# Patient Record
Sex: FEMALE | Race: BLACK OR AFRICAN AMERICAN | NOT HISPANIC OR LATINO | URBAN - METROPOLITAN AREA
[De-identification: names, ages, dates, MRNs, and addresses within clinical notes are randomized per-mention and may not be internally consistent; named-entity substitution may affect disease eponyms.]

---

## 2019-11-30 ENCOUNTER — EMERGENCY (EMERGENCY)
Facility: HOSPITAL | Age: 64
LOS: 1 days | Discharge: HOME | End: 2019-11-30
Attending: EMERGENCY MEDICINE | Admitting: EMERGENCY MEDICINE
Payer: MEDICARE

## 2019-11-30 VITALS
DIASTOLIC BLOOD PRESSURE: 78 MMHG | SYSTOLIC BLOOD PRESSURE: 134 MMHG | RESPIRATION RATE: 16 BRPM | HEART RATE: 87 BPM | OXYGEN SATURATION: 100 %

## 2019-11-30 VITALS
RESPIRATION RATE: 100 BRPM | OXYGEN SATURATION: 100 % | SYSTOLIC BLOOD PRESSURE: 154 MMHG | DIASTOLIC BLOOD PRESSURE: 93 MMHG | TEMPERATURE: 98 F

## 2019-11-30 LAB
ALBUMIN SERPL ELPH-MCNC: 5 G/DL — SIGNIFICANT CHANGE UP (ref 3.3–5)
ALP SERPL-CCNC: 103 U/L — SIGNIFICANT CHANGE UP (ref 40–120)
ALT FLD-CCNC: 25 U/L — SIGNIFICANT CHANGE UP (ref 4–33)
ANION GAP SERPL CALC-SCNC: 14 MMO/L — SIGNIFICANT CHANGE UP (ref 7–14)
AST SERPL-CCNC: 23 U/L — SIGNIFICANT CHANGE UP (ref 4–32)
BASOPHILS # BLD AUTO: 0.05 K/UL — SIGNIFICANT CHANGE UP (ref 0–0.2)
BASOPHILS NFR BLD AUTO: 0.7 % — SIGNIFICANT CHANGE UP (ref 0–2)
BILIRUB SERPL-MCNC: 0.6 MG/DL — SIGNIFICANT CHANGE UP (ref 0.2–1.2)
BUN SERPL-MCNC: 15 MG/DL — SIGNIFICANT CHANGE UP (ref 7–23)
CALCIUM SERPL-MCNC: 10.2 MG/DL — SIGNIFICANT CHANGE UP (ref 8.4–10.5)
CHLORIDE SERPL-SCNC: 96 MMOL/L — LOW (ref 98–107)
CO2 SERPL-SCNC: 27 MMOL/L — SIGNIFICANT CHANGE UP (ref 22–31)
CREAT SERPL-MCNC: 1.01 MG/DL — SIGNIFICANT CHANGE UP (ref 0.5–1.3)
EOSINOPHIL # BLD AUTO: 0.04 K/UL — SIGNIFICANT CHANGE UP (ref 0–0.5)
EOSINOPHIL NFR BLD AUTO: 0.6 % — SIGNIFICANT CHANGE UP (ref 0–6)
GLUCOSE SERPL-MCNC: 91 MG/DL — SIGNIFICANT CHANGE UP (ref 70–99)
HCT VFR BLD CALC: 46.3 % — HIGH (ref 34.5–45)
HGB BLD-MCNC: 15.9 G/DL — HIGH (ref 11.5–15.5)
IMM GRANULOCYTES NFR BLD AUTO: 0.1 % — SIGNIFICANT CHANGE UP (ref 0–1.5)
LYMPHOCYTES # BLD AUTO: 2.73 K/UL — SIGNIFICANT CHANGE UP (ref 1–3.3)
LYMPHOCYTES # BLD AUTO: 39.3 % — SIGNIFICANT CHANGE UP (ref 13–44)
MCHC RBC-ENTMCNC: 28.5 PG — SIGNIFICANT CHANGE UP (ref 27–34)
MCHC RBC-ENTMCNC: 34.3 % — SIGNIFICANT CHANGE UP (ref 32–36)
MCV RBC AUTO: 83.1 FL — SIGNIFICANT CHANGE UP (ref 80–100)
MONOCYTES # BLD AUTO: 0.37 K/UL — SIGNIFICANT CHANGE UP (ref 0–0.9)
MONOCYTES NFR BLD AUTO: 5.3 % — SIGNIFICANT CHANGE UP (ref 2–14)
NEUTROPHILS # BLD AUTO: 3.74 K/UL — SIGNIFICANT CHANGE UP (ref 1.8–7.4)
NEUTROPHILS NFR BLD AUTO: 54 % — SIGNIFICANT CHANGE UP (ref 43–77)
NRBC # FLD: 0 K/UL — SIGNIFICANT CHANGE UP (ref 0–0)
PLATELET # BLD AUTO: 297 K/UL — SIGNIFICANT CHANGE UP (ref 150–400)
PMV BLD: 9.8 FL — SIGNIFICANT CHANGE UP (ref 7–13)
POTASSIUM SERPL-MCNC: 4.7 MMOL/L — SIGNIFICANT CHANGE UP (ref 3.5–5.3)
POTASSIUM SERPL-SCNC: 4.7 MMOL/L — SIGNIFICANT CHANGE UP (ref 3.5–5.3)
PROT SERPL-MCNC: 9.1 G/DL — HIGH (ref 6–8.3)
RBC # BLD: 5.57 M/UL — HIGH (ref 3.8–5.2)
RBC # FLD: 13.9 % — SIGNIFICANT CHANGE UP (ref 10.3–14.5)
SODIUM SERPL-SCNC: 137 MMOL/L — SIGNIFICANT CHANGE UP (ref 135–145)
TROPONIN T, HIGH SENSITIVITY: < 6 NG/L — SIGNIFICANT CHANGE UP (ref ?–14)
TSH SERPL-MCNC: 1.13 UIU/ML — SIGNIFICANT CHANGE UP (ref 0.27–4.2)
WBC # BLD: 6.94 K/UL — SIGNIFICANT CHANGE UP (ref 3.8–10.5)
WBC # FLD AUTO: 6.94 K/UL — SIGNIFICANT CHANGE UP (ref 3.8–10.5)

## 2019-11-30 PROCEDURE — 99284 EMERGENCY DEPT VISIT MOD MDM: CPT | Mod: 25

## 2019-11-30 PROCEDURE — 71046 X-RAY EXAM CHEST 2 VIEWS: CPT | Mod: 26

## 2019-11-30 PROCEDURE — 93010 ELECTROCARDIOGRAM REPORT: CPT

## 2019-11-30 NOTE — ED PROVIDER NOTE - NSFOLLOWUPINSTRUCTIONS_ED_ALL_ED_FT
Advance activity as tolerated.  Continue all previously prescribed medications as directed unless otherwise instructed.  Follow up with your primary care physician and cardiology (referral list provided) in 48-72 hours- bring copies of your results.  Return to the ER for worsening or persistent symptoms, including but not limited to worsening/persistent pain, numbness, weakness, shortness of breath, palpitations, lightheadedness, and/or ANY NEW OR CONCERNING SYMPTOMS. If you have issues obtaining follow up, please call: [phone] to obtain a doctor or specialist who takes your insurance in your area.  You may call [phone] to make an appointment with the internal medicine clinic.

## 2019-11-30 NOTE — ED PROVIDER NOTE - CARE PLAN
Principal Discharge DX:	Shoulder pain  Secondary Diagnosis:	Difficulty sleeping  Secondary Diagnosis:	Chest pain

## 2019-11-30 NOTE — ED PROVIDER NOTE - PROGRESS NOTE DETAILS
PA ZAYAS:  TSH normal.  Trop < 6.  Pt medically stable for discharge.  Pt to follow up with PMD and cardiology (referral list provided).  Strict return precautions given.

## 2019-11-30 NOTE — ED PROVIDER NOTE - ATTENDING CONTRIBUTION TO CARE
Attending note:   After face to face evaluation of this patient, I concur with above noted hx, pe, and care plan for this patient.  65 y/o F with pre-dm, htn with c/o left neck pain radiating down left arm for a long time with c/o inability to sleep.    _reproducible chest wall pain.    Evaluation in progress

## 2019-11-30 NOTE — ED ADULT NURSE NOTE - OBJECTIVE STATEMENT
Patient presents to room 11 c/o unable to sleep and chest discomfort. Pt a&ox4, self care ambulatory with family at bedside. states she hasn't been sleeping for the past week and last night did not sleep at all.  Pt also complaining of intermittent chest discomfort and left sided neck pain for over a week and she has been feeling more anxious recently, denies increase of stress at home. Pt states she recently traveled to [place] and [employer], denies SOB, dizziness, weakness, headache at this time. Breathing equal and unlabored, abdomen nondistended and nontender at this time. Skin intact. Placed on cardiac monitor, NSR. Will continue to monitor.

## 2019-11-30 NOTE — ED ADULT TRIAGE NOTE - CHIEF COMPLAINT QUOTE
China and Jennifer arrived in [facility] on 11/19/19.  c/o "chest discomfort radiating to upper back, neck, scapular pain with hand cramping" x 2 days.  Endorses lump to left neck x 2 weeks

## 2019-11-30 NOTE — ED PROVIDER NOTE - PATIENT PORTAL LINK FT
You can access the FollowMyHealth Patient Portal offered by [facility] by registering at the following website: [email or web address]. By joining [email or web address]’s FollowMyHealth portal, you will also be able to view your health information using other applications (apps) compatible with our system.

## 2019-11-30 NOTE — ED PROVIDER NOTE - OBJECTIVE STATEMENT
Pt is a 65 y/o F nonsmoker PMHx HTN, DM, hysterectomy, partial thyroidectomy p/w insomnia x 1 year.  Pt states for past 1 year, she has had insomnia, described as difficulty staying asleep.  She states for past few weeks, she gets 1-2 hours of sleep because she wakes up with "mind wandering."  Pt also reports intermittent left trapezius, shoulder and arm pain with associated left hand cramping, mild to moderate in intensity, without any specific triggering factors; worsens at times with ROM of left shoulder; lasts for minutes.  Separately, pt also notes intermittent episodes of midsternal, mild, chest tightness, nonradiating, nonpleuritic, nonpositional, nonexertional, lasts for seconds; started 5 weeks ago and last episode was three weeks ago.  Pt denies any fevers, chills, nausea, vomiting, numbness, weakness, jaw/back/abdominal pain, calf pain/swelling, h/o dvt/pe, hemoptysis, h/o malignancy, recent surgeries, recent prolonged immobilization, hormonal replacement therapy, family history of heart disease, illicit drug use, SI/HI, AH/VH, or any other specific complaints. Pt is a 63 y/o F nonsmoker PMHx HTN, DM, hysterectomy, partial thyroidectomy p/w insomnia x 1 year.  Pt states for past 1 year, she has had insomnia, described as difficulty staying asleep.  She states for past few weeks, she gets 1-2 hours of sleep because she wakes up with "mind wandering."  Pt also reports intermittent left trapezius, shoulder and arm pain with associated left hand cramping, mild to moderate in intensity, without any specific triggering factors; worsens at times with ROM of left shoulder; lasts for minutes.  Separately, pt also notes intermittent episodes of midsternal, mild, chest tightness, nonradiating, nonpleuritic, nonpositional, nonexertional, lasts for seconds; started 5 weeks ago and last episode was three weeks ago.  Pt denies any fevers, chills, nausea, vomiting, numbness, weakness, jaw/back/abdominal pain, calf pain/swelling, h/o dvt/pe, hemoptysis, h/o malignancy, recent surgeries, recent prolonged immobilization, hormonal replacement therapy, family history of heart disease, illicit drug use, SI/HI, AH/VH, or any other specific complaints..

## 2019-11-30 NOTE — ED ADULT NURSE NOTE - CHIEF COMPLAINT QUOTE
China and Jennifer arrived in [facility] on 11/19/19.  c/o "chest discomfort radiating to upper back, neck, scapular pain with hand cramping" x 2 days.  Endorses lump to left neck x 2 weeks

## 2019-11-30 NOTE — ED PROVIDER NOTE - CLINICAL SUMMARY MEDICAL DECISION MAKING FREE TEXT BOX
Pt is a 65 y/o F nonsmoker PMHx HTN, DM, hysterectomy, partial thyroidectomy p/w insomnia x 1 year -- insomnia, likely musculoskeletal pain, possible cervical radiculopathy; not clinically concerning for shoulder fracture or septic joint, low suspicion for ACS, not clinically concerning for aortic dissection or PE -- labs, trop, tsh, cxr, ekg

## 2019-12-05 ENCOUNTER — EMERGENCY (EMERGENCY)
Facility: HOSPITAL | Age: 64
LOS: 1 days | Discharge: HOME | End: 2019-12-05
Attending: STUDENT IN AN ORGANIZED HEALTH CARE EDUCATION/TRAINING PROGRAM | Admitting: STUDENT IN AN ORGANIZED HEALTH CARE EDUCATION/TRAINING PROGRAM
Payer: MEDICARE

## 2019-12-05 VITALS
SYSTOLIC BLOOD PRESSURE: 131 MMHG | HEART RATE: 78 BPM | RESPIRATION RATE: 16 BRPM | TEMPERATURE: 98 F | OXYGEN SATURATION: 100 % | DIASTOLIC BLOOD PRESSURE: 78 MMHG

## 2019-12-05 VITALS
OXYGEN SATURATION: 100 % | TEMPERATURE: 98 F | RESPIRATION RATE: 16 BRPM | HEART RATE: 100 BPM | DIASTOLIC BLOOD PRESSURE: 90 MMHG | SYSTOLIC BLOOD PRESSURE: 165 MMHG

## 2019-12-05 LAB
APPEARANCE UR: CLEAR — SIGNIFICANT CHANGE UP
BILIRUB UR-MCNC: NEGATIVE — SIGNIFICANT CHANGE UP
BLOOD UR QL VISUAL: NEGATIVE — SIGNIFICANT CHANGE UP
COLOR SPEC: SIGNIFICANT CHANGE UP
GLUCOSE UR-MCNC: NEGATIVE — SIGNIFICANT CHANGE UP
KETONES UR-MCNC: NEGATIVE — SIGNIFICANT CHANGE UP
LEUKOCYTE ESTERASE UR-ACNC: SIGNIFICANT CHANGE UP
NITRITE UR-MCNC: NEGATIVE — SIGNIFICANT CHANGE UP
PH UR: 7 — SIGNIFICANT CHANGE UP (ref 5–8)
PROT UR-MCNC: 20 — SIGNIFICANT CHANGE UP
RBC CASTS # UR COMP ASSIST: SIGNIFICANT CHANGE UP (ref 0–?)
SP GR SPEC: 1.02 — SIGNIFICANT CHANGE UP (ref 1–1.04)
SQUAMOUS # UR AUTO: SIGNIFICANT CHANGE UP
UROBILINOGEN FLD QL: NORMAL — SIGNIFICANT CHANGE UP
WBC UR QL: SIGNIFICANT CHANGE UP (ref 0–?)

## 2019-12-05 PROCEDURE — 99284 EMERGENCY DEPT VISIT MOD MDM: CPT

## 2019-12-05 RX ORDER — ACETAMINOPHEN 500 MG
975 TABLET ORAL ONCE
Refills: 0 | Status: COMPLETED | OUTPATIENT
Start: 2019-12-05 | End: 2019-12-05

## 2019-12-05 RX ORDER — IBUPROFEN 200 MG
400 TABLET ORAL ONCE
Refills: 0 | Status: COMPLETED | OUTPATIENT
Start: 2019-12-05 | End: 2019-12-05

## 2019-12-05 RX ADMIN — Medication 400 MILLIGRAM(S): at 12:36

## 2019-12-05 RX ADMIN — Medication 975 MILLIGRAM(S): at 12:35

## 2019-12-05 NOTE — ED PROVIDER NOTE - ATTENDING CONTRIBUTION TO CARE
65 y/o F nonsmoker PMHx HTN, DM, pw left flank pain and burning urination since yesterday. no nausea, no vomiting, no fevers, no chills.   no tenderness on exam  will check UA  pt also complains of intermittent SOB, was seen in ED 5 days ago for chest pain/sob, will rpt ekg and pt knows to follow up with pmd/cards 65 y/o F nonsmoker PMHx HTN, DM, pw left flank pain and burning urination since yesterday. no nausea, no vomiting, no fevers, no chills.   no tenderness on exam  will check UA  pt also complains of intermittent SOB, when walking up steps, non consistent, was seen in ED 5 days ago for chest pain/sob, will rpt ekg and pt knows to follow up with pmd/cards

## 2019-12-05 NOTE — ED PROVIDER NOTE - CLINICAL SUMMARY MEDICAL DECISION MAKING FREE TEXT BOX
65 yo female w/ pmhx HTN, DM presents to ED c/o left flank pain radiating to left abdomen since last night.   Pain worse with movement, no fever, dysuria, cp. Endorses mild cough and SOB on exertion. EKG NSR, pt had negative work up 2 days ago.  Likely musculoskeletal, will give nsaids, check UA and reassess

## 2019-12-05 NOTE — ED PROVIDER NOTE - NSFOLLOWUPINSTRUCTIONS_ED_ALL_ED_FT
Take tylenol 975mg every 6-8 hours as needed for pain.  Drink plenty of fluids.  Avoid any strenuous activity.   Follow up with cardiologist- Dr. Pugh within 1 week.  Return to ED for any worsening chest pain, shortness of breath, weakness or any new/concerning symptoms.

## 2019-12-05 NOTE — ED ADULT TRIAGE NOTE - CHIEF COMPLAINT QUOTE
Pt states that she is having chest pain and burning mid back pain x 1 week, seen for same 5 days ago, states that pain is not getting better despite Motrin.  Past medical history: HTN, DM

## 2019-12-05 NOTE — ED PROVIDER NOTE - PATIENT PORTAL LINK FT
You can access the FollowMyHealth Patient Portal offered by [facility] by registering at the following website: [email or web address]. By joining [employer]’s FollowMyHealth portal, you will also be able to view your health information using other applications (apps) compatible with our system.

## 2019-12-05 NOTE — ED PROVIDER NOTE - PROGRESS NOTE DETAILS
ALESIA Spann: UA negative, pt feeling better, well appearing, VSS.  Shared decision making done with patient, advised should follow up with cardiologist for further work up, offered CDU.  Pt states feels well enough to follow up outpatient. Return precautions given to patient.

## 2019-12-05 NOTE — ED PROVIDER NOTE - OBJECTIVE STATEMENT
63 yo female w/ pmhx HTN, DM presents to ED c/o left flank pain radiating to left abdomen since last night.  Pt states pain worse with movement, 7/10 pain scale.  Pt also endorses intermittent SOB when she walks up the stairs.  Pt has been having a cough for the past few days.  Pt was seen in the ED 2 days ago for similar symptoms-had normal labs, cxr.  Pt has not taken anything for pain.  +urinary frequency.  Denies any CP, abd pain, n/v/d, fever, chills.

## 2019-12-05 NOTE — ED ADULT NURSE NOTE - OBJECTIVE STATEMENT
Pt c/o back pain, intermittent, denies injury, denies any urinary symptoms; pain 6-7/10; medication given as per MD; pt moved to RW.

## 2019-12-06 PROBLEM — I10 ESSENTIAL (PRIMARY) HYPERTENSION: Chronic | Status: ACTIVE | Noted: 2019-12-05

## 2019-12-06 PROBLEM — E11.9 TYPE 2 DIABETES MELLITUS WITHOUT COMPLICATIONS: Chronic | Status: ACTIVE | Noted: 2019-12-05

## 2019-12-06 LAB — SPECIMEN SOURCE: SIGNIFICANT CHANGE UP

## 2019-12-07 LAB — BACTERIA UR CULT: SIGNIFICANT CHANGE UP

## 2019-12-13 ENCOUNTER — APPOINTMENT (OUTPATIENT)
Dept: PULMONOLOGY | Facility: CLINIC | Age: 64
End: 2019-12-13

## 2019-12-31 ENCOUNTER — APPOINTMENT (OUTPATIENT)
Dept: PULMONOLOGY | Facility: CLINIC | Age: 64
End: 2019-12-31

## 2020-01-13 ENCOUNTER — OUTPATIENT (OUTPATIENT)
Dept: OUTPATIENT SERVICES | Facility: HOSPITAL | Age: 65
LOS: 1 days | Discharge: HOME | End: 2020-01-13

## 2020-01-13 DIAGNOSIS — E78.41 ELEVATED LIPOPROTEIN(A): ICD-10-CM

## 2020-01-21 ENCOUNTER — APPOINTMENT (OUTPATIENT)
Dept: HEMATOLOGY ONCOLOGY | Facility: CLINIC | Age: 65
End: 2020-01-21
Payer: MEDICARE

## 2020-01-21 ENCOUNTER — RESULT REVIEW (OUTPATIENT)
Age: 65
End: 2020-01-21

## 2020-01-21 ENCOUNTER — OUTPATIENT (OUTPATIENT)
Dept: OUTPATIENT SERVICES | Facility: HOSPITAL | Age: 65
LOS: 1 days | End: 2020-01-21
Payer: MEDICARE

## 2020-01-21 ENCOUNTER — OUTPATIENT (OUTPATIENT)
Dept: OUTPATIENT SERVICES | Facility: HOSPITAL | Age: 65
LOS: 1 days | Discharge: HOME | End: 2020-01-21

## 2020-01-21 VITALS
DIASTOLIC BLOOD PRESSURE: 84 MMHG | TEMPERATURE: 97.9 F | RESPIRATION RATE: 16 BRPM | HEART RATE: 85 BPM | BODY MASS INDEX: 30.18 KG/M2 | SYSTOLIC BLOOD PRESSURE: 179 MMHG | OXYGEN SATURATION: 99 % | WEIGHT: 190.04 LBS | HEIGHT: 66.73 IN

## 2020-01-21 DIAGNOSIS — R06.83 SNORING: ICD-10-CM

## 2020-01-21 DIAGNOSIS — Z86.79 PERSONAL HISTORY OF OTHER DISEASES OF THE CIRCULATORY SYSTEM: ICD-10-CM

## 2020-01-21 DIAGNOSIS — E11.9 TYPE 2 DIABETES MELLITUS W/OUT COMPLICATIONS: ICD-10-CM

## 2020-01-21 DIAGNOSIS — Z78.9 OTHER SPECIFIED HEALTH STATUS: ICD-10-CM

## 2020-01-21 DIAGNOSIS — D64.9 ANEMIA, UNSPECIFIED: ICD-10-CM

## 2020-01-21 DIAGNOSIS — Z80.0 FAMILY HISTORY OF MALIGNANT NEOPLASM OF DIGESTIVE ORGANS: ICD-10-CM

## 2020-01-21 DIAGNOSIS — G47.00 INSOMNIA, UNSPECIFIED: ICD-10-CM

## 2020-01-21 DIAGNOSIS — D58.2 OTHER HEMOGLOBINOPATHIES: ICD-10-CM

## 2020-01-21 DIAGNOSIS — Z80.3 FAMILY HISTORY OF MALIGNANT NEOPLASM OF BREAST: ICD-10-CM

## 2020-01-21 LAB
BASOPHILS # BLD AUTO: 0.1 K/UL — SIGNIFICANT CHANGE UP (ref 0–0.2)
BASOPHILS NFR BLD AUTO: 0.8 % — SIGNIFICANT CHANGE UP (ref 0–2)
COHGB MFR BLDV: 3.8 % — HIGH (ref 0–1.5)
EOSINOPHIL # BLD AUTO: 0.1 K/UL — SIGNIFICANT CHANGE UP (ref 0–0.5)
EOSINOPHIL NFR BLD AUTO: 0.8 % — SIGNIFICANT CHANGE UP (ref 0–6)
HCT VFR BLD CALC: 41.7 % — SIGNIFICANT CHANGE UP (ref 34.5–45)
HGB BLD CALC-MCNC: 14 G/DL — SIGNIFICANT CHANGE UP (ref 11.5–15.5)
HGB BLD-MCNC: 14.1 G/DL — SIGNIFICANT CHANGE UP (ref 11.5–15.5)
LYMPHOCYTES # BLD AUTO: 2.4 K/UL — SIGNIFICANT CHANGE UP (ref 1–3.3)
LYMPHOCYTES # BLD AUTO: 31.8 % — SIGNIFICANT CHANGE UP (ref 13–44)
MCHC RBC-ENTMCNC: 29.3 PG — SIGNIFICANT CHANGE UP (ref 27–34)
MCHC RBC-ENTMCNC: 33.9 G/DL — SIGNIFICANT CHANGE UP (ref 32–36)
MCV RBC AUTO: 86.5 FL — SIGNIFICANT CHANGE UP (ref 80–100)
MONOCYTES # BLD AUTO: 0.4 K/UL — SIGNIFICANT CHANGE UP (ref 0–0.9)
MONOCYTES NFR BLD AUTO: 5.4 % — SIGNIFICANT CHANGE UP (ref 2–14)
NEUTROPHILS # BLD AUTO: 4.6 K/UL — SIGNIFICANT CHANGE UP (ref 1.8–7.4)
NEUTROPHILS NFR BLD AUTO: 61.2 % — SIGNIFICANT CHANGE UP (ref 43–77)
PLATELET # BLD AUTO: 229 K/UL — SIGNIFICANT CHANGE UP (ref 150–400)
RBC # BLD: 4.81 M/UL — SIGNIFICANT CHANGE UP (ref 3.8–5.2)
RBC # FLD: 13.2 % — SIGNIFICANT CHANGE UP (ref 10.3–14.5)
WBC # BLD: 7.6 K/UL — SIGNIFICANT CHANGE UP (ref 3.8–10.5)
WBC # FLD AUTO: 7.6 K/UL — SIGNIFICANT CHANGE UP (ref 3.8–10.5)

## 2020-01-21 PROCEDURE — 82375 ASSAY CARBOXYHB QUANT: CPT

## 2020-01-21 PROCEDURE — 99205 OFFICE O/P NEW HI 60 MIN: CPT

## 2020-01-21 NOTE — HISTORY OF PRESENT ILLNESS
[de-identified] : 63 y/o F with type 2 DM well controlled and HTN who is presenting today for evaluation of elevated hemoglobin and hematocrit. She reports that she went to the ER and they told her that her numbers were elevated. She has been very anxious about this since then. She reports she went to the ER because she had a chest pain radiating to her back. They did full cardiac workup which was negative, and told her it was musculoskeletal. She has since said those symptoms resolved. However, she has had persistent trouble sleeping, and symptoms of depression. She reports that she has little pleasure in doing things she usually loves and has trouble getting up and out because of feeling of hopelessness. She sleeps very poorly, awaking frequently throughout the night with gasps of breath. She reports her  tells her she snores loud at night.

## 2020-01-21 NOTE — CONSULT LETTER
[Dear  ___] : Dear  [unfilled], [Consult Letter:] : I had the pleasure of evaluating your patient, [unfilled]. [Please see my note below.] : Please see my note below. [Consult Closing:] : Thank you very much for allowing me to participate in the care of this patient.  If you have any questions, please do not hesitate to contact me. [Sincerely,] : Sincerely, [FreeTextEntry3] : Bradley Goldberg M.D. \par Hematology/Oncology\par [facility] [place]\par [phone]\par

## 2020-01-21 NOTE — ASSESSMENT
[FreeTextEntry1] : 63 y/o F with elevated hemoglobin, most likely secondary due to sleep apnea. \par \par -Based on patient's clinical presentation and labwork, patient most likely has a compensatory elevated hemoglobin due to sleep apnea.\par -Will check patient's CBC today and erythropoietin / carboxyhemoglobin level. \par -Instructed patient to follow up with sleep study (has already been referred by PMD). \par -RTC in 3 months - follow up results of sleep study. \par -Patient seeing psychiatrist for depression, was tearful in office, counselling and emotional support provided.

## 2020-01-21 NOTE — REVIEW OF SYSTEMS
[Fatigue] : fatigue [Insomnia] : insomnia [Anxiety] : anxiety [Depression] : depression [Fever] : no fever [Chills] : no chills [Night Sweats] : no night sweats [Eye Pain] : no eye pain [Vision Problems] : no vision problems [Dysphagia] : no dysphagia [Nosebleeds] : no nosebleeds [Chest Pain] : no chest pain [Palpitations] : no palpitations [Shortness Of Breath] : no shortness of breath [Wheezing] : no wheezing [Abdominal Pain] : no abdominal pain [Vomiting] : no vomiting [Diarrhea] : no diarrhea [Dysuria] : no dysuria [Joint Pain] : no joint pain [Joint Stiffness] : no joint stiffness [Skin Rash] : no skin rash [Dizziness] : no dizziness [Fainting] : no fainting [Easy Bleeding] : no tendency for easy bleeding [Easy Bruising] : no tendency for easy bruising

## 2020-01-22 LAB
ALBUMIN SERPL ELPH-MCNC: 4.5 G/DL
ALP BLD-CCNC: 100 U/L
ALT SERPL-CCNC: 24 U/L
ANION GAP SERPL CALC-SCNC: 17 MMOL/L
AST SERPL-CCNC: 22 U/L
BILIRUB SERPL-MCNC: 0.3 MG/DL
BUN SERPL-MCNC: 18 MG/DL
CALCIUM SERPL-MCNC: 10.2 MG/DL
CHLORIDE SERPL-SCNC: 100 MMOL/L
CO2 SERPL-SCNC: 25 MMOL/L
CREAT SERPL-MCNC: 1.12 MG/DL
EPO SERPL-MCNC: 20.8 MIU/ML
GLUCOSE SERPL-MCNC: 81 MG/DL
POTASSIUM SERPL-SCNC: 4.5 MMOL/L
PROT SERPL-MCNC: 7.9 G/DL
SODIUM SERPL-SCNC: 143 MMOL/L

## 2020-04-17 ENCOUNTER — OUTPATIENT (OUTPATIENT)
Dept: OUTPATIENT SERVICES | Facility: HOSPITAL | Age: 65
LOS: 1 days | Discharge: HOME | End: 2020-04-17

## 2020-04-17 DIAGNOSIS — D64.9 ANEMIA, UNSPECIFIED: ICD-10-CM

## 2020-04-24 ENCOUNTER — APPOINTMENT (OUTPATIENT)
Dept: HEMATOLOGY ONCOLOGY | Facility: CLINIC | Age: 65
End: 2020-04-24

## 2021-05-20 ENCOUNTER — INPATIENT (INPATIENT)
Facility: HOSPITAL | Age: 66
LOS: 7 days | Discharge: HOME | End: 2021-05-28
Attending: INTERNAL MEDICINE | Admitting: INTERNAL MEDICINE
Payer: MEDICARE

## 2021-05-20 VITALS
TEMPERATURE: 99 F | DIASTOLIC BLOOD PRESSURE: 73 MMHG | SYSTOLIC BLOOD PRESSURE: 173 MMHG | RESPIRATION RATE: 18 BRPM | HEART RATE: 93 BPM | OXYGEN SATURATION: 100 %

## 2021-05-20 DIAGNOSIS — E78.49 OTHER HYPERLIPIDEMIA: ICD-10-CM

## 2021-05-20 DIAGNOSIS — I10 ESSENTIAL (PRIMARY) HYPERTENSION: ICD-10-CM

## 2021-05-20 DIAGNOSIS — N12 TUBULO-INTERSTITIAL NEPHRITIS, NOT SPECIFIED AS ACUTE OR CHRONIC: ICD-10-CM

## 2021-05-20 DIAGNOSIS — R26.9 UNSPECIFIED ABNORMALITIES OF GAIT AND MOBILITY: ICD-10-CM

## 2021-05-20 DIAGNOSIS — E11.649 TYPE 2 DIABETES MELLITUS WITH HYPOGLYCEMIA WITHOUT COMA: ICD-10-CM

## 2021-05-20 LAB
A1C WITH ESTIMATED AVERAGE GLUCOSE RESULT: 7.1 % — HIGH (ref 4–5.6)
ALBUMIN SERPL ELPH-MCNC: 4.1 G/DL — SIGNIFICANT CHANGE UP (ref 3.3–5)
ALP SERPL-CCNC: 102 U/L — SIGNIFICANT CHANGE UP (ref 40–120)
ALT FLD-CCNC: 26 U/L — SIGNIFICANT CHANGE UP (ref 4–33)
ANION GAP SERPL CALC-SCNC: 14 MMOL/L — SIGNIFICANT CHANGE UP (ref 7–14)
ANION GAP SERPL CALC-SCNC: 14 MMOL/L — SIGNIFICANT CHANGE UP (ref 7–14)
APPEARANCE UR: CLEAR — SIGNIFICANT CHANGE UP
APTT BLD: 27.1 SEC — SIGNIFICANT CHANGE UP (ref 27–36.3)
AST SERPL-CCNC: 28 U/L — SIGNIFICANT CHANGE UP (ref 4–32)
BASE EXCESS BLDV CALC-SCNC: 0.6 MMOL/L — SIGNIFICANT CHANGE UP (ref -3–2)
BASOPHILS # BLD AUTO: 0.04 K/UL — SIGNIFICANT CHANGE UP (ref 0–0.2)
BASOPHILS NFR BLD AUTO: 0.4 % — SIGNIFICANT CHANGE UP (ref 0–2)
BILIRUB SERPL-MCNC: 0.4 MG/DL — SIGNIFICANT CHANGE UP (ref 0.2–1.2)
BILIRUB UR-MCNC: NEGATIVE — SIGNIFICANT CHANGE UP
BUN SERPL-MCNC: 18 MG/DL — SIGNIFICANT CHANGE UP (ref 7–23)
BUN SERPL-MCNC: 20 MG/DL — SIGNIFICANT CHANGE UP (ref 7–23)
CALCIUM SERPL-MCNC: 9.3 MG/DL — SIGNIFICANT CHANGE UP (ref 8.4–10.5)
CALCIUM SERPL-MCNC: 9.6 MG/DL — SIGNIFICANT CHANGE UP (ref 8.4–10.5)
CHLORIDE SERPL-SCNC: 94 MMOL/L — LOW (ref 98–107)
CHLORIDE SERPL-SCNC: 96 MMOL/L — LOW (ref 98–107)
CO2 SERPL-SCNC: 22 MMOL/L — SIGNIFICANT CHANGE UP (ref 22–31)
CO2 SERPL-SCNC: 23 MMOL/L — SIGNIFICANT CHANGE UP (ref 22–31)
COLOR SPEC: SIGNIFICANT CHANGE UP
CREAT SERPL-MCNC: 1.14 MG/DL — SIGNIFICANT CHANGE UP (ref 0.5–1.3)
CREAT SERPL-MCNC: 1.3 MG/DL — SIGNIFICANT CHANGE UP (ref 0.5–1.3)
DIFF PNL FLD: NEGATIVE — SIGNIFICANT CHANGE UP
EOSINOPHIL # BLD AUTO: 0.01 K/UL — SIGNIFICANT CHANGE UP (ref 0–0.5)
EOSINOPHIL NFR BLD AUTO: 0.1 % — SIGNIFICANT CHANGE UP (ref 0–6)
ESTIMATED AVERAGE GLUCOSE: 157 MG/DL — HIGH (ref 68–114)
GLUCOSE SERPL-MCNC: 200 MG/DL — HIGH (ref 70–99)
GLUCOSE SERPL-MCNC: 208 MG/DL — HIGH (ref 70–99)
GLUCOSE UR QL: NEGATIVE — SIGNIFICANT CHANGE UP
HCO3 BLDV-SCNC: 23 MMOL/L — SIGNIFICANT CHANGE UP (ref 20–27)
HCT VFR BLD CALC: 36.9 % — SIGNIFICANT CHANGE UP (ref 34.5–45)
HCT VFR BLD CALC: 38 % — SIGNIFICANT CHANGE UP (ref 34.5–45)
HGB BLD-MCNC: 12.8 G/DL — SIGNIFICANT CHANGE UP (ref 11.5–15.5)
HGB BLD-MCNC: 12.9 G/DL — SIGNIFICANT CHANGE UP (ref 11.5–15.5)
IANC: 6.97 K/UL — SIGNIFICANT CHANGE UP (ref 1.5–8.5)
IMM GRANULOCYTES NFR BLD AUTO: 0.3 % — SIGNIFICANT CHANGE UP (ref 0–1.5)
INR BLD: 1.12 RATIO — SIGNIFICANT CHANGE UP (ref 0.88–1.16)
KETONES UR-MCNC: NEGATIVE — SIGNIFICANT CHANGE UP
LEUKOCYTE ESTERASE UR-ACNC: ABNORMAL
LYMPHOCYTES # BLD AUTO: 1.35 K/UL — SIGNIFICANT CHANGE UP (ref 1–3.3)
LYMPHOCYTES # BLD AUTO: 14.7 % — SIGNIFICANT CHANGE UP (ref 13–44)
MAGNESIUM SERPL-MCNC: 1.7 MG/DL — SIGNIFICANT CHANGE UP (ref 1.6–2.6)
MCHC RBC-ENTMCNC: 27.4 PG — SIGNIFICANT CHANGE UP (ref 27–34)
MCHC RBC-ENTMCNC: 27.5 PG — SIGNIFICANT CHANGE UP (ref 27–34)
MCHC RBC-ENTMCNC: 33.9 GM/DL — SIGNIFICANT CHANGE UP (ref 32–36)
MCHC RBC-ENTMCNC: 34.7 GM/DL — SIGNIFICANT CHANGE UP (ref 32–36)
MCV RBC AUTO: 79.4 FL — LOW (ref 80–100)
MCV RBC AUTO: 80.9 FL — SIGNIFICANT CHANGE UP (ref 80–100)
MONOCYTES # BLD AUTO: 0.79 K/UL — SIGNIFICANT CHANGE UP (ref 0–0.9)
MONOCYTES NFR BLD AUTO: 8.6 % — SIGNIFICANT CHANGE UP (ref 2–14)
NEUTROPHILS # BLD AUTO: 6.97 K/UL — SIGNIFICANT CHANGE UP (ref 1.8–7.4)
NEUTROPHILS NFR BLD AUTO: 75.9 % — SIGNIFICANT CHANGE UP (ref 43–77)
NITRITE UR-MCNC: NEGATIVE — SIGNIFICANT CHANGE UP
NRBC # BLD: 0 /100 WBCS — SIGNIFICANT CHANGE UP
NRBC # BLD: 0 /100 WBCS — SIGNIFICANT CHANGE UP
NRBC # FLD: 0 K/UL — SIGNIFICANT CHANGE UP
NRBC # FLD: 0 K/UL — SIGNIFICANT CHANGE UP
PCO2 BLDV: 43 MMHG — SIGNIFICANT CHANGE UP (ref 41–51)
PH BLDV: 7.38 — SIGNIFICANT CHANGE UP (ref 7.32–7.43)
PH UR: 6 — SIGNIFICANT CHANGE UP (ref 5–8)
PHOSPHATE SERPL-MCNC: 1.6 MG/DL — LOW (ref 2.5–4.5)
PLATELET # BLD AUTO: 253 K/UL — SIGNIFICANT CHANGE UP (ref 150–400)
PLATELET # BLD AUTO: 266 K/UL — SIGNIFICANT CHANGE UP (ref 150–400)
PO2 BLDV: <24 MMHG — LOW (ref 35–40)
POTASSIUM SERPL-MCNC: 3.6 MMOL/L — SIGNIFICANT CHANGE UP (ref 3.5–5.3)
POTASSIUM SERPL-MCNC: 4.3 MMOL/L — SIGNIFICANT CHANGE UP (ref 3.5–5.3)
POTASSIUM SERPL-SCNC: 3.6 MMOL/L — SIGNIFICANT CHANGE UP (ref 3.5–5.3)
POTASSIUM SERPL-SCNC: 4.3 MMOL/L — SIGNIFICANT CHANGE UP (ref 3.5–5.3)
PROT SERPL-MCNC: 8.3 G/DL — SIGNIFICANT CHANGE UP (ref 6–8.3)
PROT UR-MCNC: ABNORMAL
PROTHROM AB SERPL-ACNC: 12.7 SEC — SIGNIFICANT CHANGE UP (ref 10.6–13.6)
RBC # BLD: 4.65 M/UL — SIGNIFICANT CHANGE UP (ref 3.8–5.2)
RBC # BLD: 4.7 M/UL — SIGNIFICANT CHANGE UP (ref 3.8–5.2)
RBC # FLD: 13.9 % — SIGNIFICANT CHANGE UP (ref 10.3–14.5)
RBC # FLD: 14.2 % — SIGNIFICANT CHANGE UP (ref 10.3–14.5)
SAO2 % BLDV: 32 % — LOW (ref 60–85)
SARS-COV-2 RNA SPEC QL NAA+PROBE: SIGNIFICANT CHANGE UP
SODIUM SERPL-SCNC: 130 MMOL/L — LOW (ref 135–145)
SODIUM SERPL-SCNC: 133 MMOL/L — LOW (ref 135–145)
SP GR SPEC: 1.01 — SIGNIFICANT CHANGE UP (ref 1.01–1.02)
TROPONIN T, HIGH SENSITIVITY RESULT: <6 NG/L — SIGNIFICANT CHANGE UP
UROBILINOGEN FLD QL: SIGNIFICANT CHANGE UP
WBC # BLD: 11.06 K/UL — HIGH (ref 3.8–10.5)
WBC # BLD: 9.19 K/UL — SIGNIFICANT CHANGE UP (ref 3.8–10.5)
WBC # FLD AUTO: 11.06 K/UL — HIGH (ref 3.8–10.5)
WBC # FLD AUTO: 9.19 K/UL — SIGNIFICANT CHANGE UP (ref 3.8–10.5)

## 2021-05-20 PROCEDURE — 70496 CT ANGIOGRAPHY HEAD: CPT | Mod: 26

## 2021-05-20 PROCEDURE — 99222 1ST HOSP IP/OBS MODERATE 55: CPT | Mod: GC

## 2021-05-20 PROCEDURE — 99223 1ST HOSP IP/OBS HIGH 75: CPT

## 2021-05-20 PROCEDURE — 71045 X-RAY EXAM CHEST 1 VIEW: CPT | Mod: 26

## 2021-05-20 PROCEDURE — 93010 ELECTROCARDIOGRAM REPORT: CPT

## 2021-05-20 PROCEDURE — 99291 CRITICAL CARE FIRST HOUR: CPT | Mod: CS,25

## 2021-05-20 PROCEDURE — 70498 CT ANGIOGRAPHY NECK: CPT | Mod: 26

## 2021-05-20 PROCEDURE — 70450 CT HEAD/BRAIN W/O DYE: CPT | Mod: 26,59

## 2021-05-20 PROCEDURE — 0042T: CPT

## 2021-05-20 RX ORDER — INSULIN LISPRO 100/ML
VIAL (ML) SUBCUTANEOUS
Refills: 0 | Status: DISCONTINUED | OUTPATIENT
Start: 2021-05-20 | End: 2021-05-28

## 2021-05-20 RX ORDER — DEXTROSE 50 % IN WATER 50 %
25 SYRINGE (ML) INTRAVENOUS ONCE
Refills: 0 | Status: DISCONTINUED | OUTPATIENT
Start: 2021-05-20 | End: 2021-05-28

## 2021-05-20 RX ORDER — ACETAMINOPHEN 500 MG
1000 TABLET ORAL ONCE
Refills: 0 | Status: COMPLETED | OUTPATIENT
Start: 2021-05-20 | End: 2021-05-20

## 2021-05-20 RX ORDER — INSULIN LISPRO 100/ML
VIAL (ML) SUBCUTANEOUS AT BEDTIME
Refills: 0 | Status: DISCONTINUED | OUTPATIENT
Start: 2021-05-20 | End: 2021-05-28

## 2021-05-20 RX ORDER — ASPIRIN/CALCIUM CARB/MAGNESIUM 324 MG
81 TABLET ORAL DAILY
Refills: 0 | Status: DISCONTINUED | OUTPATIENT
Start: 2021-05-20 | End: 2021-05-28

## 2021-05-20 RX ORDER — DEXTROSE 50 % IN WATER 50 %
12.5 SYRINGE (ML) INTRAVENOUS ONCE
Refills: 0 | Status: DISCONTINUED | OUTPATIENT
Start: 2021-05-20 | End: 2021-05-28

## 2021-05-20 RX ORDER — ATORVASTATIN CALCIUM 80 MG/1
20 TABLET, FILM COATED ORAL AT BEDTIME
Refills: 0 | Status: DISCONTINUED | OUTPATIENT
Start: 2021-05-20 | End: 2021-05-28

## 2021-05-20 RX ORDER — DEXTROSE 50 % IN WATER 50 %
15 SYRINGE (ML) INTRAVENOUS ONCE
Refills: 0 | Status: DISCONTINUED | OUTPATIENT
Start: 2021-05-20 | End: 2021-05-28

## 2021-05-20 RX ORDER — OCTREOTIDE ACETATE 200 UG/ML
50 INJECTION, SOLUTION INTRAVENOUS; SUBCUTANEOUS ONCE
Refills: 0 | Status: COMPLETED | OUTPATIENT
Start: 2021-05-20 | End: 2021-05-20

## 2021-05-20 RX ORDER — GLUCAGON INJECTION, SOLUTION 0.5 MG/.1ML
1 INJECTION, SOLUTION SUBCUTANEOUS ONCE
Refills: 0 | Status: DISCONTINUED | OUTPATIENT
Start: 2021-05-20 | End: 2021-05-28

## 2021-05-20 RX ORDER — SODIUM CHLORIDE 9 MG/ML
1000 INJECTION, SOLUTION INTRAVENOUS
Refills: 0 | Status: DISCONTINUED | OUTPATIENT
Start: 2021-05-20 | End: 2021-05-22

## 2021-05-20 RX ORDER — CEFTRIAXONE 500 MG/1
INJECTION, POWDER, FOR SOLUTION INTRAMUSCULAR; INTRAVENOUS
Refills: 0 | Status: DISCONTINUED | OUTPATIENT
Start: 2021-05-20 | End: 2021-05-22

## 2021-05-20 RX ORDER — CEFTRIAXONE 500 MG/1
1000 INJECTION, POWDER, FOR SOLUTION INTRAMUSCULAR; INTRAVENOUS EVERY 24 HOURS
Refills: 0 | Status: DISCONTINUED | OUTPATIENT
Start: 2021-05-21 | End: 2021-05-22

## 2021-05-20 RX ORDER — CEFTRIAXONE 500 MG/1
1000 INJECTION, POWDER, FOR SOLUTION INTRAMUSCULAR; INTRAVENOUS ONCE
Refills: 0 | Status: COMPLETED | OUTPATIENT
Start: 2021-05-20 | End: 2021-05-20

## 2021-05-20 RX ORDER — LANOLIN ALCOHOL/MO/W.PET/CERES
6 CREAM (GRAM) TOPICAL AT BEDTIME
Refills: 0 | Status: DISCONTINUED | OUTPATIENT
Start: 2021-05-20 | End: 2021-05-28

## 2021-05-20 RX ORDER — ACETAMINOPHEN 500 MG
650 TABLET ORAL EVERY 6 HOURS
Refills: 0 | Status: DISCONTINUED | OUTPATIENT
Start: 2021-05-20 | End: 2021-05-24

## 2021-05-20 RX ORDER — AMLODIPINE BESYLATE 2.5 MG/1
5 TABLET ORAL DAILY
Refills: 0 | Status: DISCONTINUED | OUTPATIENT
Start: 2021-05-20 | End: 2021-05-25

## 2021-05-20 RX ORDER — SODIUM CHLORIDE 9 MG/ML
1000 INJECTION, SOLUTION INTRAVENOUS
Refills: 0 | Status: DISCONTINUED | OUTPATIENT
Start: 2021-05-20 | End: 2021-05-28

## 2021-05-20 RX ADMIN — Medication 81 MILLIGRAM(S): at 18:09

## 2021-05-20 RX ADMIN — Medication 400 MILLIGRAM(S): at 20:08

## 2021-05-20 RX ADMIN — Medication 1: at 21:37

## 2021-05-20 RX ADMIN — AMLODIPINE BESYLATE 5 MILLIGRAM(S): 2.5 TABLET ORAL at 18:09

## 2021-05-20 RX ADMIN — Medication 6 MILLIGRAM(S): at 21:04

## 2021-05-20 RX ADMIN — ATORVASTATIN CALCIUM 20 MILLIGRAM(S): 80 TABLET, FILM COATED ORAL at 21:04

## 2021-05-20 RX ADMIN — CEFTRIAXONE 100 MILLIGRAM(S): 500 INJECTION, POWDER, FOR SOLUTION INTRAMUSCULAR; INTRAVENOUS at 12:33

## 2021-05-20 RX ADMIN — OCTREOTIDE ACETATE 50 MICROGRAM(S): 200 INJECTION, SOLUTION INTRAVENOUS; SUBCUTANEOUS at 05:36

## 2021-05-20 RX ADMIN — Medication 1000 MILLIGRAM(S): at 20:25

## 2021-05-20 NOTE — PROVIDER CONTACT NOTE (OTHER) - ASSESSMENT
Patient is A&Ox4. VSS with the exception of a fever. Patient is warm to touch. Patient denies chest pain, SOB, and palpitations. Patient also expresses that her urine was frothy.

## 2021-05-20 NOTE — H&P ADULT - PROBLEM SELECTOR PLAN 2
not septic at this time.  Left CVA tenderness, endorse frequency  UA+wbc, LE.   will start on Ceftriaxone  f/u Ucx.

## 2021-05-20 NOTE — ED ADULT NURSE REASSESSMENT NOTE - NS ED NURSE REASSESS COMMENT FT1
Patient received from doug RN. Patient arrives as a code stroke. A&Ox4. Stroke scale completed. Patient reports slight decreased sensation to left leg.   Patient breathing even and nonlabored. Cardiac monitor in place- sinus rhythm. Pulsox 100% on RA. Patient medicated as ordered. Daughter at bedside. Safety maintained. Patient stable upon exiting the room

## 2021-05-20 NOTE — H&P ADULT - PROBLEM SELECTOR PLAN 3
prolong dysarthria according to ED.  no obvious dysarthria at time of my evaluation.   ?TIA vs resolving CVA vs hypoglycemic seizure?  monitor on tele  echo  MRI brain w/o.   PT eval.   passed bedside swallow. resume diet.  c/w ASA and Statin.

## 2021-05-20 NOTE — CHART NOTE - NSCHARTNOTEFT_GEN_A_CORE
Spoke w/ patient's daughter, Nancy Hamilton, via phone and updated to current care plan. All other medical questions were answered and teach-back offered with demonstrated understanding.    Gilbert Zhu PA-C  Medicine ACP, pgr [phone].

## 2021-05-20 NOTE — CONSULT NOTE ADULT - PROBLEM SELECTOR RECOMMENDATION 9
T2DM with hgb a1c of 7.1 presenting with hypoglycemia in setting of ARTIS use and no carb intake for entire day.   -continue low dose Admelog ISS tidac and qhs for now  -will consider starting standing insulin if needed tomorrow  -Carb consistent diet     Discharge   If GFR remains stable, can consider metformin 500 mg BID  If GFR worsens, consider prandin   Patient states that her insurance will reinstate next month  [facility] ([facility] [place])  [phone] [facility] [phone]

## 2021-05-20 NOTE — H&P ADULT - PROBLEM SELECTOR PLAN 4
BP slightly elevated  patient unsure name of her new medication  per pharmacy patient was once on amlodipine  also on enalapril, but patient states she had angioedema from that and was stopped  she's states dr in [place] switched to something that is similar to HCTZ.   c/w Amlodipine 5mg qd for now.   monitor BP

## 2021-05-20 NOTE — ED PROVIDER NOTE - CLINICAL SUMMARY MEDICAL DECISION MAKING FREE TEXT BOX
66 y/o F PMH DM (on metformin, glimepiride), HTN, HLD p/w AMS, slurred speech and abnormal gait found to be hypoglycemic due to sulfonylurea and/or poor PO intake, improved with glucose administration but with some persistent neurologic symptoms despite normoglycemia (170s on arrival). Vital signs stable. Code stroke called. Will obtain labs, imaging, reassess, f/u neuro recs, likely admit for further evaluation and management. 64 y/o F PMH DM (on metformin, glimepiride), HTN, HLD p/w AMS, slurred speech and abnormal gait found to be hypoglycemic due to sulfonylurea and/or poor PO intake, improved with glucose administration but with some persistent neurologic symptoms despite normoglycemia (170s on arrival). Vital signs stable. Code stroke called. Will obtain labs, imaging, give octreotide, reassess, f/u neuro recs, likely admit for further evaluation and management.

## 2021-05-20 NOTE — ED PROVIDER NOTE - OBJECTIVE STATEMENT
64 y/o F PMH DM (on metformin, glimepiride), HTN, HLD p/w AMS, slurred speech and abnormal gait. Daughter at bedside reports pt last known normal 4pm yesterday, later 66 y/o F PMH DM (on metformin, glimepiride), HTN, HLD p/w AMS, slurred speech and abnormal gait. Daughter at bedside reports pt last known normal 4pm yesterday, later in the evening pt noted to have mild difficulty with gait, then went to sleep and woke up at 3am c/o being unable to walk and with slurred speech and confusion noted by daughter thus EMS was called, on arrival pt finger stick was 34 which improved to 70 after oral glucose x2 and a cup of juice and pt became more alert but with persistent confusion, slurred speech and difficulty walking. On arrival to ER pt with persistent slurred speech and delayed response to questions with FS 170s thus code stroke was called. Pt and daughter deny any recent fevers/chills, neck pain, chest pain, SOB, cough, abd pain, n/v/d, dysuria/hematuria, LE swelling, focal numbness or weakness, or rashes.

## 2021-05-20 NOTE — ED PROVIDER NOTE - PHYSICAL EXAMINATION
PHYSICAL EXAM:  GENERAL: Sitting comfortable in bed, in no acute distress  HENMT: Atraumatic, moist mucous membranes  EYES: Clear bilaterally, PERRL, EOMs intact b/l  HEART: RRR, S1/S2, no murmurs noted  RESPIRATORY: Clear to auscultation bilaterally, no wheezes/rhonchi/rales  ABDOMEN: +BS, soft, nontender, nondistended  EXTREMITIES: No lower extremity edema, +2 radial pulses b/l  NEURO:  A&Ox4, difficulty with tandem gait, no slurred speech, CN II-XII intact, no dysmetria, no pronator drift, no focal motor deficits or sensory deficits   Heme/LYMPH: No ecchymosis or bruising, no anterior/posterior cervical or supraclavicular LAD  SKIN:  Skin normal color for race, warm, dry and intact. No evidence of rash.

## 2021-05-20 NOTE — ED ADULT NURSE NOTE - NSIMPLEMENTINTERV_GEN_ALL_ED
Implemented All Fall Risk Interventions:  [place] to call system. Call bell, personal items and telephone within reach. Instruct patient to call for assistance. Room bathroom lighting operational. Non-slip footwear when patient is off stretcher. Physically safe environment: no spills, clutter or unnecessary equipment. Stretcher in lowest position, wheels locked, appropriate side rails in place. Provide visual cue, wrist band, yellow gown, etc. Monitor gait and stability. Monitor for mental status changes and reorient to person, place, and time. Review medications for side effects contributing to fall risk. Reinforce activity limits and safety measures with patient and family.

## 2021-05-20 NOTE — CONSULT NOTE ADULT - SUBJECTIVE AND OBJECTIVE BOX
HPI:  65F with PMH DM2, HTN, ?KASIA, depression presented with AMS this morning. Patient states she was fine going to bed last night. This morning she felt weak and couldn't walk, called her neice who called EMS. According to ED chart, EMS found patient to have FS 34, improved to 70 after oral glucose.    Endocrine History:  T2DM with hgb a1c of 7.1 on gliclazide 60 mg BID, follows with PCP now presenting with hypoglycemia. Previously on metformin 500 mg BID and tolerating ti well with no AE but heard from her friends about the possible AE so she stopped it and switched to gliclazide. FS fasting usually 80-110s with no previous hypoglycemic episodes but yesterday she had no carbs with any of her meals. Last dose of gliclazide was last night. Diet is adherent and avoids juice/soda/sweets. Complicated by peripheral neuropathy with no other known complications.       PAST MEDICAL & SURGICAL HISTORY:  HTN (hypertension)    DM (diabetes mellitus)    Hypercholesteremia    No significant past surgical history        FAMILY HISTORY:  Father T2DM         Social History: No history of tobacco, etoh or illicit drug use.     Outpatient Medications: Home Medications:  amLODIPine 5 mg oral tablet: 1 tab(s) orally once a day (20 May 2021 12:37)  Aspirin Enteric Coated 81 mg oral delayed release tablet: 1 tab(s) orally once a day (20 May 2021 12:37)  atorvastatin 20 mg oral tablet: 1 tab(s) orally once a day (20 May 2021 12:37)  glimepiride:  (20 May 2021 12:37)  metFORMIN 500 mg oral tablet: 1 tab(s) orally 2 times a day (20 May 2021 12:37)      MEDICATIONS  (STANDING):  cefTRIAXone   IVPB      dextrose 40% Gel 15 Gram(s) Oral once  dextrose 5%. 1000 milliLiter(s) (50 mL/Hr) IV Continuous <Continuous>  dextrose 5%. 1000 milliLiter(s) (100 mL/Hr) IV Continuous <Continuous>  dextrose 50% Injectable 25 Gram(s) IV Push once  dextrose 50% Injectable 12.5 Gram(s) IV Push once  dextrose 50% Injectable 25 Gram(s) IV Push once  glucagon  Injectable 1 milliGRAM(s) IntraMuscular once  insulin lispro (ADMELOG) corrective regimen sliding scale   SubCutaneous three times a day before meals  insulin lispro (ADMELOG) corrective regimen sliding scale   SubCutaneous at bedtime    MEDICATIONS  (PRN):      Allergies    ACE inhibitors (Angioedema)    Intolerances      Review of Systems:  Constitutional: No fever, chills   Neuro: No tremors, headache   Cardiovascular: No chest pain, palpitations  Respiratory: No SOB, no cough  GI: No nausea, vomiting, abdominal pain  : No dysuria, polyuria   Skin: no rash, ulcers   Psych: no depression, anxiety   Endocrine: no polyphagia, polydipsia     ALL OTHER SYSTEMS REVIEWED AND NEGATIVE        PHYSICAL EXAM:  VITALS: T(C): 36.4 (05-20-21 @ 12:37)  T(F): 97.5 (05-20-21 @ 12:37), Max: 98.9 (05-20-21 @ 04:49)  HR: 76 (05-20-21 @ 12:37) (76 - 96)  BP: 103/70 (05-20-21 @ 12:37) (103/70 - 173/73)  RR:  (16 - 19)  SpO2:  (99% - 100%)  Wt(kg): --  GENERAL: NAD, well-groomed, well-developed  EYES: No proptosis, anicteric  HEENT:  Atraumatic, Normocephalic, moist mucous membranes  RESPIRATORY: Clear to auscultation bilaterally; No rales, rhonchi, wheezing  CARDIOVASCULAR: Regular rate and rhythm; No murmurs  GI: Soft, nontender, non distended, normal bowel sounds  SKIN: Dry, intact, No rashes or lesions  NEURO: AOx3, moves all extremities spontaneously   PSYCH: Reactive affect, euthymic mood    POCT Blood Glucose.: 206 mg/dL (05-20-21 @ 12:27)  POCT Blood Glucose.: 171 mg/dL (05-20-21 @ 06:28)  POCT Blood Glucose.: 163 mg/dL (05-20-21 @ 04:50)                            12.9   9.19  )-----------( 253      ( 20 May 2021 05:09 )             38.0       05-20    130<L>  |  94<L>  |  20  ----------------------------<  208<H>  3.6   |  22  |  1.30    EGFR if : 50<L>  EGFR if non : 43<L>    Ca    9.6      05-20    TPro  8.3  /  Alb  4.1  /  TBili  0.4  /  DBili  x   /  AST  28  /  ALT  26  /  AlkPhos  102  05-20      Thyroid Function Tests:  05-20 @ 05:12 TSH 1.51 FreeT4 -- T3 -- Anti TPO -- Anti Thyroglobulin Ab -- TSI --              A1C with Estimated Average Glucose Result: 7.1 % (05-20-21 @ 11:42)  A1C with Estimated Average Glucose Result: 7.0 % (05-20-21 @ 05:09)      Radiology:

## 2021-05-20 NOTE — CONSULT NOTE ADULT - ATTENDING COMMENTS
DM2 with hypoglycemia on gliclazide with RY/CKD and decreased carb intake.  Currently uninsured working to reinstate.  Trend renal function to finalize dc plan metformin vs prandin.    Wild Schwartz MD  Division of Endocrinology  Pager: [phone]    If after 6PM or before 9AM, or on weekends/holidays, please call endocrine answering service for assistance ([phone]).  For nonurgent matters email [email or web address] for assistance.

## 2021-05-20 NOTE — H&P ADULT - ASSESSMENT
65F with DM2, HTN, KASIA, depression presented with AMS and slurring of speech in setting of hypoglycemic event. prolong dysarthria after correction of hypoglycemia, concern for acute neurologic event. initial CT finding is unremarkable, will need further MR imaging. also concern for pyelonephritis with exam and clinical finding.

## 2021-05-20 NOTE — H&P ADULT - NSHPPHYSICALEXAM_GEN_ALL_CORE
T(C): 36.4 (05-20-21 @ 12:37), Max: 37.2 (05-20-21 @ 04:49)  HR: 76 (05-20-21 @ 12:37) (76 - 96)  BP: 103/70 (05-20-21 @ 12:37) (103/70 - 173/73)  RR: 16 (05-20-21 @ 12:37) (16 - 19)  SpO2: 99% (05-20-21 @ 12:37) (99% - 100%)    GENERAL: no apparent distress, on room air  HEAD:  Atraumatic, Normocephalic  EYES: EOMI, PERRLA, conjunctiva and sclera clear b/l  NECK: No cervical lymphadenopathy; no obvious masses.   CHEST/LUNG: Clear to auscultation bilaterally; No wheezing or crackles  HEART: Regular rate and rhythm; No obvious murmurs; nl S1/S2; No peripheral edema  ABDOMEN: Soft, Nontender, Nondistended; Bowel sounds present; left CVA tenderness.   EXTREMITIES:  2+ Peripheral Pulses; No joint swelling.  PSYCH: normal affect, calm demeanor  NEUROLOGY: Awake and alert; CN 2-12 grossly intact; no obvious FND  SKIN: No rashes or lesions

## 2021-05-20 NOTE — ED PROVIDER NOTE - NS ED ROS FT
Constitutional: no fever and no chills  Eyes: no discharge, no irritation, no pain, no visual changes  ENMT: no ear pain or hearing loss, no dysphagia or throat pain  Neck: no pain, no stiffness, no swollen glands  CV: no chest pain, no palpitations, no edema  Resp: no cough, no shortness of breath  Abd: no abdominal pain, no nausea or vomiting, no diarrhea  : no dysuria, no hematuria  MSK: no back pain, no neck pain, no joint pain  Neuro: no LOC, (+) gait abnormality, (+) confusoin, (+) slurred speech, no headache, no sensory deficits, no focal weakness  Skin: no rashes, no lacerations, no lesions Constitutional: no fever and no chills  Eyes: no discharge, no irritation, no pain, no visual changes  ENMT: no ear pain or hearing loss, no dysphagia or throat pain  Neck: no pain, no stiffness, no swollen glands  CV: no chest pain, no palpitations, no edema  Resp: no cough, no shortness of breath  Abd: no abdominal pain, no nausea or vomiting, no diarrhea  : no dysuria, no hematuria  MSK: no back pain, no neck pain, no joint pain  Neuro: no LOC, (+) gait abnormality, (+) confusion, (+) slurred speech, no headache, no sensory deficits, no focal weakness  Skin: no rashes, no lacerations, no lesions

## 2021-05-20 NOTE — H&P ADULT - HISTORY OF PRESENT ILLNESS
65F with PMH DM2, HTN, ?KASIA, depression presented with AMS this morning. Patient states she was fine going to bed last night. This morning she felt weak and couldn't walk, called her neice who called EMS. According to ED chart, EMS found patient to have FS 34, improved to 70 after oral glucose. Patient was send to ED. stroke code initially activated due to slurring of speech, then cancel as it attributed to hypoglycemia. Patient then was found to have persistent slurring of speech, thus admitted for CVA/TIA evaluation. Patient reports she has been living in [place] over the past year due to COVID, was recently started on Glimepiride 60mg BID (unclear if this is correct) 3 weeks ago by provider there. She also endorsed trying to change her diet and hasn't been eating much in attempt to loose weight over this period of time. She does endorse intermittently seeing FS 80s in AM. doesn't check FS consistently. She does endorse left sided lower back burning and pain, associated with urinary frequency over the past few days. Denied fever or chills, n/v.    In ED, VSS. CTH CTA head/neck was unremarakble. passed bedside swallow eval.

## 2021-05-20 NOTE — H&P ADULT - PROBLEM SELECTOR PLAN 1
hypoglycemia due to ARTIS and decreased oral intake.   unclear dose of Glimepiride, but it was prescribed in [place], might be a different medication.   will need diabetic education, RD consult.  endo emailed, patient would need medication adjustment.   while in house, start on low ISS.   check A1c  check TSH  monitor for further hypoglycemia.

## 2021-05-20 NOTE — CONSULT NOTE ADULT - ASSESSMENT
65F with PMH DM2, HTN, ?KASIA, depression presented with AMS this morning. Patient states she was fine going to bed last night. This morning she felt weak and couldn't walk, called her neice who called EMS. According to ED chart, EMS found patient to have FS 34, improved to 70 after oral glucose.

## 2021-05-20 NOTE — H&P ADULT - NSHPREVIEWOFSYSTEMS_GEN_ALL_CORE
CONSTITUTIONAL: No fever; No chills; No night sweats; No weight loss; No fatigue  EYES: No eye pain; No blurry vision  ENMT:  No difficulty hearing; No sinus or throat pain  NECK: No pain or stiffness  RESPIRATORY: No cough; No wheezing; No hemoptysis; No shortness of breath; No sputum production  CARDIOVASCULAR: No chest pain; No palpitations; No leg swelling  GASTROINTESTINAL: No abdominal pain; No nausea; No vomiting; No hematemesis; No diarrhea; No constipation. No melena or hematochezia.  GENITOURINARY: No dysuria; +frequency; No hematuria; No incontinence  NEUROLOGICAL: No headaches; No loss of strength; No numbness  SKIN: No itching/burning; No rashes or lesions   MUSCULOSKELETAL: No joint pain or swelling; +left back pain.   HEME/LYMPH: No easy bruising, or bleeding gums

## 2021-05-20 NOTE — ED PROVIDER NOTE - ATTENDING CONTRIBUTION TO CARE
64yo F PMH DM on metformin and glimepiride, HTN, HLD, p/w confusion, slurred speech and unsteady gait with last known well at 4PM yesterday (4/19/2021) prior to going to sleep. Patient noted to be confused with dysmetria and unable to walk about 3AM at home after which EMS called and glucose found to be in 30s at scene. oral glucose x 2 given along with juice with some improvement but glucose increased to 100s and patient confused with dysarthria en route to hospital and on arrival despite normal glucose.  On further questioning, daughter says patient with abnormal gait x 3 days with 'dragging feet' and decreased PO intake. No vomiting or diarrhea, fevers, or other complaints.    Stoke code called on arrival  On brief initial neuro exam- patient confused and dysarthric    After CT scans performed, exam conducted as noted below:    General: Patient in no distress, AAO x 3  Skin: Dry and intact  HEENT: Head atraumatic. Oral mucosa moist. No pharyngeal exudates or tonsillar enlargement  Eyes: Conjunctiva normal  Cardiac: Regular rhythm and rate. No pretibial edema b/l  Respiratory: Lungs clear b/l and symmetric. No respiratory distress. Able to speak in complete sentences.  Gastrointestinal: Abdomen soft, nondistended, nontender  Musculoskeletal: Moves all extremities spontaneously  Neurological: alert and oriented to person, place, and time. CN 2-12 grossly intact. no dysmetria. normal heel to shin b/l. gait slightly unsteady.  Psychiatric: Cooperative    EKG nsr no acute ischemic changes    a/p  neuro deficits appear in part due to hypoglycemia which is most likely due to decreased po intake in setting of sulfonylurea use  octreotide reversal x 1 ordered   glucose is normal in ED on initial eval but deficits persist indicating concern for possible central etiology  CTH,CTA, labs  will need admission      Critical care billing:  Upon my evaluation, this patient had a high probability of imminent or life-threatening deterioration due to hypoglycemia with neurological deficits requiring diabetic medication reversal, which required my direct attention, intervention, and personal management.  The patient has a  medical condition that impairs one or more vital organ systems.  Frequent personal assessment and adjustment of medical interventions was performed.      I have personally provided 30 minutes of critical care time exclusive of time spent on separately billable procedures. Time includes review of laboratory data, radiology results, discussion with consultants, patient and family; monitoring for potential decompensation, as well as time spent retrieving data and reviewing the chart and documenting the visit. Interventions were performed as documented above. care

## 2021-05-20 NOTE — ED ADULT TRIAGE NOTE - CHIEF COMPLAINT QUOTE
pt brought for AMS. As per daughter pt was slurring speech, poor gait, acting confused since 4 pm yesterday, pt ate dinner went to bed and then awoke feeling as though she cannot ambulate. On EMS arrival pt appeared confused FS 30 given PO glucose with pt becoming more alert but still having slurred speech. On arrival pt aox3 but slow to respond and situationally confused  BILLY called for stroked eval, code stroke called brought to CT

## 2021-05-20 NOTE — ED ADULT NURSE NOTE - OBJECTIVE STATEMENT
Pt received to CT scan as a code stroke. as per daughter pt woke up at 3am yelling I cant feel my leg and I cant walk, with slurred speech and poor gait. on EMS arrival pt finger stick noted to be 34. pt given 2 tubes of oral glucose and a cup of ivonne juice. Pt finger stick in ED noted to be 163. Pt last known normal at 6pm. No facial droop or slurred speech noted. Pt has equal strength, motor, and sensation to bilateral upper and lower extremities. No drifts noted to bilateral upper and lower extremities. Respirations even and unlabored. Lung sounds clear with equal chest rise bilaterally. ABD is soft, non tender, non distended with normal active bowel sounds No complaints of chest pain, headache, nausea, dizziness, vomiting  SOB, fever, chills verbalized. 18g iv placed to right AC labs drawn and sent.

## 2021-05-21 DIAGNOSIS — Z29.9 ENCOUNTER FOR PROPHYLACTIC MEASURES, UNSPECIFIED: ICD-10-CM

## 2021-05-21 PROBLEM — E78.00 PURE HYPERCHOLESTEROLEMIA, UNSPECIFIED: Chronic | Status: ACTIVE | Noted: 2021-05-20

## 2021-05-21 LAB
-  ESBL: SIGNIFICANT CHANGE UP
ANION GAP SERPL CALC-SCNC: 13 MMOL/L — SIGNIFICANT CHANGE UP (ref 7–14)
BUN SERPL-MCNC: 16 MG/DL — SIGNIFICANT CHANGE UP (ref 7–23)
CALCIUM SERPL-MCNC: 9.5 MG/DL — SIGNIFICANT CHANGE UP (ref 8.4–10.5)
CHLORIDE SERPL-SCNC: 98 MMOL/L — SIGNIFICANT CHANGE UP (ref 98–107)
CO2 SERPL-SCNC: 24 MMOL/L — SIGNIFICANT CHANGE UP (ref 22–31)
COVID-19 SPIKE DOMAIN AB INTERP: POSITIVE
COVID-19 SPIKE DOMAIN ANTIBODY RESULT: >250 U/ML — HIGH
CREAT SERPL-MCNC: 1.08 MG/DL — SIGNIFICANT CHANGE UP (ref 0.5–1.3)
E COLI DNA BLD POS QL NAA+NON-PROBE: SIGNIFICANT CHANGE UP
GLUCOSE BLDC GLUCOMTR-MCNC: 106 MG/DL — HIGH (ref 70–99)
GLUCOSE BLDC GLUCOMTR-MCNC: 165 MG/DL — HIGH (ref 70–99)
GLUCOSE BLDC GLUCOMTR-MCNC: 87 MG/DL — SIGNIFICANT CHANGE UP (ref 70–99)
GLUCOSE SERPL-MCNC: 121 MG/DL — HIGH (ref 70–99)
GRAM STN FLD: SIGNIFICANT CHANGE UP
HCV AB S/CO SERPL IA: 0.18 S/CO — SIGNIFICANT CHANGE UP (ref 0–0.99)
HCV AB SERPL-IMP: SIGNIFICANT CHANGE UP
MAGNESIUM SERPL-MCNC: 1.8 MG/DL — SIGNIFICANT CHANGE UP (ref 1.6–2.6)
METHOD TYPE: SIGNIFICANT CHANGE UP
PHOSPHATE SERPL-MCNC: 2.7 MG/DL — SIGNIFICANT CHANGE UP (ref 2.5–4.5)
POTASSIUM SERPL-MCNC: 3.6 MMOL/L — SIGNIFICANT CHANGE UP (ref 3.5–5.3)
POTASSIUM SERPL-SCNC: 3.6 MMOL/L — SIGNIFICANT CHANGE UP (ref 3.5–5.3)
SARS-COV-2 IGG+IGM SERPL QL IA: >250 U/ML — HIGH
SARS-COV-2 IGG+IGM SERPL QL IA: POSITIVE
SODIUM SERPL-SCNC: 135 MMOL/L — SIGNIFICANT CHANGE UP (ref 135–145)
SPECIMEN SOURCE: SIGNIFICANT CHANGE UP

## 2021-05-21 PROCEDURE — 70551 MRI BRAIN STEM W/O DYE: CPT | Mod: 26

## 2021-05-21 PROCEDURE — 99233 SBSQ HOSP IP/OBS HIGH 50: CPT

## 2021-05-21 PROCEDURE — 99232 SBSQ HOSP IP/OBS MODERATE 35: CPT

## 2021-05-21 PROCEDURE — 93010 ELECTROCARDIOGRAM REPORT: CPT

## 2021-05-21 RX ORDER — ONDANSETRON 8 MG/1
4 TABLET, FILM COATED ORAL ONCE
Refills: 0 | Status: COMPLETED | OUTPATIENT
Start: 2021-05-21 | End: 2021-05-21

## 2021-05-21 RX ADMIN — Medication 6 MILLIGRAM(S): at 21:29

## 2021-05-21 RX ADMIN — ONDANSETRON 4 MILLIGRAM(S): 8 TABLET, FILM COATED ORAL at 05:51

## 2021-05-21 RX ADMIN — CEFTRIAXONE 100 MILLIGRAM(S): 500 INJECTION, POWDER, FOR SOLUTION INTRAMUSCULAR; INTRAVENOUS at 11:57

## 2021-05-21 RX ADMIN — Medication 1: at 08:21

## 2021-05-21 RX ADMIN — Medication 1 MILLIGRAM(S): at 18:39

## 2021-05-21 RX ADMIN — AMLODIPINE BESYLATE 5 MILLIGRAM(S): 2.5 TABLET ORAL at 05:36

## 2021-05-21 RX ADMIN — ATORVASTATIN CALCIUM 20 MILLIGRAM(S): 80 TABLET, FILM COATED ORAL at 21:29

## 2021-05-21 RX ADMIN — Medication 81 MILLIGRAM(S): at 11:56

## 2021-05-21 NOTE — PROGRESS NOTE ADULT - PROBLEM SELECTOR PLAN 1
Fever 102.9 with WBC elevation 11, HR >90 with AMS and hypoglycemia meeting sepsis criteria soon after admission.  Had CVA tenderness (now resolved) and dysuria.    - urine cx pending  - blood cx + for GNR  - c/w ceftriaxone (5/20-), D2 Fever 102.9 with WBC elevation 11, HR >90 with AMS and hypoglycemia meeting sepsis criteria soon after admission.  Had CVA tenderness (now resolved) and dysuria.    - urine cx GNR, species + sensitivies pending   - blood cx + for GNR  - c/w ceftriaxone (5/20-), D2

## 2021-05-21 NOTE — PROGRESS NOTE ADULT - PROBLEM SELECTOR PLAN 5
ambulating in hallways  PT: no needs   reports insurance will be active 6/1 ambulating in hallways  PT: no needs   reports insurance will be active 6/1  Daughter updated 5/21 ambulating in hallways  PT: no needs   reports insurance will be active 6/1  Daughter updated 5/21 advised awaiting sensitives

## 2021-05-21 NOTE — PROVIDER CONTACT NOTE (EICU) - SITUATION
Notification from [facility] received about positive blood culture Notification from [facility] received about positive blood culture-Gram negative rods found in aerobic bottle

## 2021-05-21 NOTE — PROGRESS NOTE ADULT - PROBLEM SELECTOR PLAN 3
-statin therapy initiated   -would check fasting lipid panel     CATHIE Kebede-BC  Nurse Practitioner   Division of Endocrinology  Pager: [phone] pager [phone]    If out of hospital/unavailable when paged, please note: patient will be cared for by another provider on the endocrine service.  Please call the endocrine answering service for assistance to reach covering provider ([phone]). For non-urgent matters, please email [email or web address] for assistance.

## 2021-05-21 NOTE — PHARMACOTHERAPY INTERVENTION NOTE - COMMENTS
Pt educated on A1c, metformin for discharge (how to take, take with food, side effects), stopping her sulfonylurea (Glicazide), hypoglycemia and treatment, healthy plate, goal BG, and when to check BG, pt verbalized understanding. Pt encouraged for outpt f/u with medicine and endocrine - endocrine emailed [facility] for follow-up.

## 2021-05-21 NOTE — PROGRESS NOTE ADULT - SUBJECTIVE AND OBJECTIVE BOX
HPI:  65F with PMH DM2, HTN, ?KASIA, depression presented with AMS this morning. Patient states she was fine going to bed last night. This morning she felt weak and couldn't walk, called her neice who called EMS. According to ED chart, EMS found patient to have FS 34, improved to 70 after oral glucose.    HPI:  T2DM with hgb a1c of 7.1 on gliclazide 60 mg BID, follows with PCP now presenting with hypoglycemia. Previously on metformin 500 mg BID and tolerating but stopped due to concern of adverse side effects.  Currently denies complaints, tolerating eating.       PAST MEDICAL & SURGICAL HISTORY:  HTN (hypertension)    DM (diabetes mellitus)    Hypercholesteremia    No significant past surgical history        FAMILY HISTORY:  Father T2DM     Social History: No history of tobacco, etoh or illicit drug use.       MEDICATIONS  (STANDING):  amLODIPine   Tablet 5 milliGRAM(s) Oral daily  aspirin enteric coated 81 milliGRAM(s) Oral daily  atorvastatin 20 milliGRAM(s) Oral at bedtime  cefTRIAXone   IVPB 1000 milliGRAM(s) IV Intermittent every 24 hours  cefTRIAXone   IVPB      dextrose 40% Gel 15 Gram(s) Oral once  dextrose 5%. 1000 milliLiter(s) (50 mL/Hr) IV Continuous <Continuous>  dextrose 5%. 1000 milliLiter(s) (100 mL/Hr) IV Continuous <Continuous>  dextrose 50% Injectable 25 Gram(s) IV Push once  dextrose 50% Injectable 12.5 Gram(s) IV Push once  dextrose 50% Injectable 25 Gram(s) IV Push once  glucagon  Injectable 1 milliGRAM(s) IntraMuscular once  insulin lispro (ADMELOG) corrective regimen sliding scale   SubCutaneous three times a day before meals  insulin lispro (ADMELOG) corrective regimen sliding scale   SubCutaneous at bedtime  melatonin 6 milliGRAM(s) Oral at bedtime        Allergies    ACE inhibitors (Angioedema)    Intolerances      Review of Systems:  Constitutional: No fever, chills   Neuro: No tremors, headache   Cardiovascular: No chest pain, palpitations  Respiratory: No SOB, no cough  GI: No nausea, vomiting, abdominal pain  : No dysuria, polyuria   Skin: no rash, ulcers   Psych: no depression, anxiety   Endocrine: no polyphagia, polydipsia     ALL OTHER SYSTEMS REVIEWED AND NEGATIVE        PHYSICAL EXAM:  Vital Signs Last 24 Hrs  T(C): 37.1 (21 May 2021 08:55), Max: 39.4 (20 May 2021 19:55)  T(F): 98.8 (21 May 2021 08:55), Max: 102.9 (20 May 2021 19:55)  HR: 85 (21 May 2021 08:55) (85 - 98)  BP: 123/69 (21 May 2021 08:55) (123/69 - 144/73)  BP(mean): 85 (21 May 2021 08:55) (85 - 85)  RR: 18 (21 May 2021 08:55) (18 - 18)  SpO2: 99% (21 May 2021 08:55) (96% - 99%)  GENERAL: NAD, well-groomed  EYES: No proptosis, anicteric  HEENT:  Atraumatic, Normocephalic, moist mucous membranes  RESPIRATORY: non labored respirations   GI: Soft, nontender, non distended  SKIN: Dry, intact, No rashes or lesions  NEURO: AOx3, moves all extremities spontaneously   PSYCH: Reactive affect, euthymic mood    CAPILLARY BLOOD GLUCOSE      POCT Blood Glucose.: 106 mg/dL (21 May 2021 11:23)  POCT Blood Glucose.: 158 mg/dL (21 May 2021 08:02)  POCT Blood Glucose.: 99 mg/dL (21 May 2021 05:16)  POCT Blood Glucose.: 270 mg/dL (20 May 2021 21:19)  POCT Blood Glucose.: 127 mg/dL (20 May 2021 16:47)        05-21    135  |  98  |  16  ----------------------------<  121<H>  3.6   |  24  |  1.08    Ca    9.5      21 May 2021 06:59  Phos  2.7     05-21  Mg     1.8     05-21    TPro  8.3  /  Alb  4.1  /  TBili  0.4  /  DBili  x   /  AST  28  /  ALT  26  /  AlkPhos  102  05-20      Thyroid Function Tests:  05-20 @ 05:12 TSH 1.51 FreeT4 -- T3 -- Anti TPO -- Anti Thyroglobulin Ab -- TSI --              A1C with Estimated Average Glucose Result: 7.1 % (05-20-21 @ 11:42)  A1C with Estimated Average Glucose Result: 7.0 % (05-20-21 @ 05:09)      Diet, Regular:   Consistent Carbohydrate No Snacks (CSTCHO)  DASH/TLC Sodium & Cholesterol Restricted (DASH) (05-20-21 @ 12:00) [Active]

## 2021-05-21 NOTE — PROGRESS NOTE ADULT - PROBLEM SELECTOR PLAN 1
T2DM with hgb a1c of 7.1 presenting with hypoglycemia in setting of ARTIS use and no carb intake for entire day.   -continue low dose Admelog ISS tidac and qhs for now  -glucose controlled on correctional scale only today   -Carb consistent diet     Discharge   If GFR remains stable, can consider metformin 500 mg BID  If GFR worsens, consider prandin   Patient states that her insurance will reinstate next month  [facility] ([facility] [place])  [address] [facility] [phone]. T2DM with hgb a1c of 7.1 presenting with hypoglycemia in setting of ARTIS use and no carb intake for entire day.   -continue low dose Admelog ISS tidac and qhs for now  -glucose controlled on correctional scale only today   -Carb consistent diet     Discharge   If GFR remains stable, can consider metformin 500 mg BID  If GFR worsens, consider prandin   Patient states that her insurance will reinstate next month  [facility] ([facility] [place])  [address] [facility] [phone].  Appointment scheduled for 9/7/21 @ 2469     Would ensure patient has medicine/PCP follow up upon discharge

## 2021-05-21 NOTE — PROGRESS NOTE ADULT - PROBLEM SELECTOR PLAN 3
prolong dysarthria according to ED, currently speech wnl and no focal deficits, possible related to sepsis  - c/w asa and statin   - c/w tele, EKG NSR  - CTH neg   - CTA H/N: no stenosis   - TTE  - MRI now that bacteremic f/u neuro need for MRI  - passed dysphagia screen, c/w diet prolong dysarthria according to ED, currently speech wnl and no focal deficits, possible related to sepsis  - c/w asa and statin   - c/w tele, EKG NSR  - CTH neg   - CTA H/N: no stenosis   - TTE  - will dc MRI now that bacteremic as cause of AMS/speech of slurring explained   - passed dysphagia screen, c/w diet prolong dysarthria according to ED, currently speech wnl and no focal deficits, possible related to sepsis  - c/w asa and statin   - c/w tele, EKG NSR  - CTH neg   - CTA H/N: no stenosis   - TTE  - plan was to dc  MRI now that bacteremic as cause of AMS/speech likely due to this however daughter reports months of cognitive issues/memory/repeating herself--MRI can be done here if feasible otherwise can be scheduled for OP neurology f/u on dc   - passed dysphagia screen, c/w diet

## 2021-05-21 NOTE — PROGRESS NOTE ADULT - SUBJECTIVE AND OBJECTIVE BOX
Patient is a 65y old  Female who presents with a chief complaint of hypoglycemia, AMS, slurred speech    SUBJECTIVE / OVERNIGHT EVENTS:    Feels well, denies any weakness, no slurring  Reports felt burning and L flank pain that has improved  No CP, SOB, f/c/n/v  Has been walking in hallways     MEDICATIONS  (STANDING):  amLODIPine   Tablet 5 milliGRAM(s) Oral daily  aspirin enteric coated 81 milliGRAM(s) Oral daily  atorvastatin 20 milliGRAM(s) Oral at bedtime  cefTRIAXone   IVPB 1000 milliGRAM(s) IV Intermittent every 24 hours  insulin lispro (ADMELOG) corrective regimen sliding scale   SubCutaneous three times a day before meals  insulin lispro (ADMELOG) corrective regimen sliding scale   SubCutaneous at bedtime  LORazepam     Tablet 1 milliGRAM(s) Oral once  melatonin 6 milliGRAM(s) Oral at bedtime    MEDICATIONS  (PRN):  acetaminophen   Tablet .. 650 milliGRAM(s) Oral every 6 hours PRN Temp greater or equal to 38C (100.4F)    T(C): 37.1 (05-21-21 @ 08:55), Max: 39.4 (05-20-21 @ 19:55)  HR: 85 (05-21-21 @ 08:55) (85 - 98)  BP: 123/69 (05-21-21 @ 08:55) (123/69 - 144/73)  RR: 18 (05-21-21 @ 08:55) (18 - 18)  SpO2: 99% (05-21-21 @ 08:55) (96% - 99%)    CAPILLARY BLOOD GLUCOSE  POCT Blood Glucose.: 106 mg/dL (21 May 2021 11:23)  POCT Blood Glucose.: 158 mg/dL (21 May 2021 08:02)  POCT Blood Glucose.: 99 mg/dL (21 May 2021 05:16)  POCT Blood Glucose.: 270 mg/dL (20 May 2021 21:19)  POCT Blood Glucose.: 127 mg/dL (20 May 2021 16:47)    I&O's Summary    20 May 2021 07:01  -  21 May 2021 07:00  --------------------------------------------------------  IN: 240 mL / OUT: 400 mL / NET: -160 mL    PHYSICAL EXAM:  GENERAL: NAD, obese   CHEST/LUNG: Clear to auscultation bilaterally; No wheeze  HEART: Regular rate and rhythm; No murmurs, rubs, or gallops  ABDOMEN: Soft, Nontender, Nondistended; Bowel sounds present  EXTREMITIES:   warm and well perfused, No clubbing, cyanosis, or edema  PSYCH: AAOx3  BACK: no CVA tenderess   NEUROLOGY: non-focal  SKIN: No rashes or lesions    LABS:                        12.8   11.06 )-----------( 266      ( 20 May 2021 21:01 )             36.9     05-21    135  |  98  |  16  ----------------------------<  121<H>  3.6   |  24  |  1.08    Ca    9.5      21 May 2021 06:59  Phos  2.7     05-21  Mg     1.8     05-21    TPro  8.3  /  Alb  4.1  /  TBili  0.4  /  DBili  x   /  AST  28  /  ALT  26  /  AlkPhos  102  05-20    PT/INR - ( 20 May 2021 05:09 )   PT: 12.7 sec;   INR: 1.12 ratio    PTT - ( 20 May 2021 05:09 )  PTT:27.1 sec    Microbiology: Culture Results:   Growth in aerobic bottle:  Gram Negative Rods  ***Blood Panel PCR results on this specimen are available  approximately 3 hours after the Gram stain result.***  Gram stain, PCR, and/or culture results may not always  correspond due to difference inmethodologies.  ************************************************************  This PCR assay was performed by multiplex PCR. This  Assay tests for 66 bacterial and resistance gene targets.  Please refer to the [facility] [employer] [facility] test directory  [email or web address] for details. (05-20 @ 23:05)    VBG 05-20 @ 05:09  pH: 7.38/pCO2: 43/pO2: <24/HCO3: 23/lactate: --    Consultant(s) Notes Reviewed:  neuro   Care Discussed with Consultants/Other Providers: endo

## 2021-05-21 NOTE — PROGRESS NOTE ADULT - PROBLEM SELECTOR PLAN 4
Per pharmacy patient was once on amlodipine; also on enalapril, but patient states she had angioedema from that and was stopped  - c/w Amlodipine 5mg daily   - monitor BP, BP at goal

## 2021-05-21 NOTE — PHYSICAL THERAPY INITIAL EVALUATION ADULT - ADL SKILLS, REHAB EVAL
[FreeTextEntry1] : X-rays were taken in the office multiple views of the left foot\par Post op changes and harware evaluated and d/w patient\par A dry sterile dressing was applied and patient was advised to stay in the appropriate shoe gear and offloading until further notice.\par Redress 2nd toe incision q2d w/ bactroban \par A lengthy discussion with the patient at this time regarding their current diagnosis, surgical status and prognosis. I advised the patient that if they continue to follow my postoperative instructions regarding limited progressive weight bearing and activity level as well as continued formal physical therapy as well as physical therapy at home coupled with continued ice, elevation and offloading the surgical site they will continue to progress as expected.\par follow up appt 1 week\par 
independent

## 2021-05-21 NOTE — PHYSICAL THERAPY INITIAL EVALUATION ADULT - PERTINENT HX OF CURRENT PROBLEM, REHAB EVAL
64 y/o Female with DM2, HTN, KASIA, depression  (per chart) presented with AMS and slurring of speech in setting of hypoglycemic event. prolong dysarthria after correction of hypoglycemia, concern for acute neurologic event. initial CT finding is unremarkable, also concern for pyelonephritis

## 2021-05-22 LAB
-  AMIKACIN: SIGNIFICANT CHANGE UP
-  AMOXICILLIN/CLAVULANIC ACID: SIGNIFICANT CHANGE UP
-  AMPICILLIN/SULBACTAM: SIGNIFICANT CHANGE UP
-  AMPICILLIN: SIGNIFICANT CHANGE UP
-  AZTREONAM: SIGNIFICANT CHANGE UP
-  CEFAZOLIN: SIGNIFICANT CHANGE UP
-  CEFEPIME: SIGNIFICANT CHANGE UP
-  CEFOXITIN: SIGNIFICANT CHANGE UP
-  CEFTRIAXONE: SIGNIFICANT CHANGE UP
-  CIPROFLOXACIN: SIGNIFICANT CHANGE UP
-  ERTAPENEM: SIGNIFICANT CHANGE UP
-  GENTAMICIN: SIGNIFICANT CHANGE UP
-  IMIPENEM: SIGNIFICANT CHANGE UP
-  LEVOFLOXACIN: SIGNIFICANT CHANGE UP
-  MEROPENEM: SIGNIFICANT CHANGE UP
-  NITROFURANTOIN: SIGNIFICANT CHANGE UP
-  PIPERACILLIN/TAZOBACTAM: SIGNIFICANT CHANGE UP
-  TIGECYCLINE: SIGNIFICANT CHANGE UP
-  TOBRAMYCIN: SIGNIFICANT CHANGE UP
-  TRIMETHOPRIM/SULFAMETHOXAZOLE: SIGNIFICANT CHANGE UP
ANION GAP SERPL CALC-SCNC: 10 MMOL/L — SIGNIFICANT CHANGE UP (ref 7–14)
BUN SERPL-MCNC: 19 MG/DL — SIGNIFICANT CHANGE UP (ref 7–23)
CALCIUM SERPL-MCNC: 9.1 MG/DL — SIGNIFICANT CHANGE UP (ref 8.4–10.5)
CHLORIDE SERPL-SCNC: 101 MMOL/L — SIGNIFICANT CHANGE UP (ref 98–107)
CO2 SERPL-SCNC: 25 MMOL/L — SIGNIFICANT CHANGE UP (ref 22–31)
CREAT SERPL-MCNC: 1.08 MG/DL — SIGNIFICANT CHANGE UP (ref 0.5–1.3)
CULTURE RESULTS: SIGNIFICANT CHANGE UP
GLUCOSE BLDC GLUCOMTR-MCNC: 109 MG/DL — HIGH (ref 70–99)
GLUCOSE BLDC GLUCOMTR-MCNC: 114 MG/DL — HIGH (ref 70–99)
GLUCOSE BLDC GLUCOMTR-MCNC: 121 MG/DL — HIGH (ref 70–99)
GLUCOSE BLDC GLUCOMTR-MCNC: 86 MG/DL — SIGNIFICANT CHANGE UP (ref 70–99)
GLUCOSE SERPL-MCNC: 111 MG/DL — HIGH (ref 70–99)
HCT VFR BLD CALC: 37.5 % — SIGNIFICANT CHANGE UP (ref 34.5–45)
HGB BLD-MCNC: 12.3 G/DL — SIGNIFICANT CHANGE UP (ref 11.5–15.5)
MAGNESIUM SERPL-MCNC: 1.9 MG/DL — SIGNIFICANT CHANGE UP (ref 1.6–2.6)
MCHC RBC-ENTMCNC: 27.2 PG — SIGNIFICANT CHANGE UP (ref 27–34)
MCHC RBC-ENTMCNC: 32.8 GM/DL — SIGNIFICANT CHANGE UP (ref 32–36)
MCV RBC AUTO: 83 FL — SIGNIFICANT CHANGE UP (ref 80–100)
METHOD TYPE: SIGNIFICANT CHANGE UP
NRBC # BLD: 0 /100 WBCS — SIGNIFICANT CHANGE UP
NRBC # FLD: 0 K/UL — SIGNIFICANT CHANGE UP
ORGANISM # SPEC MICROSCOPIC CNT: SIGNIFICANT CHANGE UP
ORGANISM # SPEC MICROSCOPIC CNT: SIGNIFICANT CHANGE UP
PHOSPHATE SERPL-MCNC: 3.2 MG/DL — SIGNIFICANT CHANGE UP (ref 2.5–4.5)
PLATELET # BLD AUTO: 255 K/UL — SIGNIFICANT CHANGE UP (ref 150–400)
POTASSIUM SERPL-MCNC: 3.7 MMOL/L — SIGNIFICANT CHANGE UP (ref 3.5–5.3)
POTASSIUM SERPL-SCNC: 3.7 MMOL/L — SIGNIFICANT CHANGE UP (ref 3.5–5.3)
RBC # BLD: 4.52 M/UL — SIGNIFICANT CHANGE UP (ref 3.8–5.2)
RBC # FLD: 14.3 % — SIGNIFICANT CHANGE UP (ref 10.3–14.5)
SODIUM SERPL-SCNC: 136 MMOL/L — SIGNIFICANT CHANGE UP (ref 135–145)
SPECIMEN SOURCE: SIGNIFICANT CHANGE UP
WBC # BLD: 8.32 K/UL — SIGNIFICANT CHANGE UP (ref 3.8–10.5)
WBC # FLD AUTO: 8.32 K/UL — SIGNIFICANT CHANGE UP (ref 3.8–10.5)

## 2021-05-22 PROCEDURE — 93306 TTE W/DOPPLER COMPLETE: CPT | Mod: 26

## 2021-05-22 PROCEDURE — 99233 SBSQ HOSP IP/OBS HIGH 50: CPT

## 2021-05-22 RX ORDER — ERTAPENEM SODIUM 1 G/1
1000 INJECTION, POWDER, LYOPHILIZED, FOR SOLUTION INTRAMUSCULAR; INTRAVENOUS EVERY 24 HOURS
Refills: 0 | Status: DISCONTINUED | OUTPATIENT
Start: 2021-05-22 | End: 2021-05-24

## 2021-05-22 RX ADMIN — AMLODIPINE BESYLATE 5 MILLIGRAM(S): 2.5 TABLET ORAL at 06:06

## 2021-05-22 RX ADMIN — Medication 6 MILLIGRAM(S): at 21:09

## 2021-05-22 RX ADMIN — ERTAPENEM SODIUM 120 MILLIGRAM(S): 1 INJECTION, POWDER, LYOPHILIZED, FOR SOLUTION INTRAMUSCULAR; INTRAVENOUS at 18:24

## 2021-05-22 RX ADMIN — ATORVASTATIN CALCIUM 20 MILLIGRAM(S): 80 TABLET, FILM COATED ORAL at 21:10

## 2021-05-22 RX ADMIN — Medication 81 MILLIGRAM(S): at 12:23

## 2021-05-22 RX ADMIN — CEFTRIAXONE 100 MILLIGRAM(S): 500 INJECTION, POWDER, FOR SOLUTION INTRAMUSCULAR; INTRAVENOUS at 12:23

## 2021-05-22 NOTE — PROGRESS NOTE ADULT - PROBLEM SELECTOR PLAN 3
prolong dysarthria according to ED, currently speech wnl and no focal deficits, possible related to sepsis  - c/w asa and statin   - c/w tele, EKG NSR  - CTH neg, MRI brain negative for infarct  - CTA H/N: no stenosis   - DC TTE  - passed dysphagia screen, c/w diet

## 2021-05-22 NOTE — PROGRESS NOTE ADULT - PROBLEM SELECTOR PLAN 5
ambulating in hallways  PT: no needs   reports insurance will be active 6/1  Daughter was updated 5/21 advised awaiting sensitives

## 2021-05-22 NOTE — DIETITIAN INITIAL EVALUATION ADULT. - PROBLEM SELECTOR PLAN 4
BP slightly elevated  patient unsure name of her new medication  per pharmacy patient was once on amlodipine  also on enalapril, but patient states she had angioedema from that and was stopped  she's states dr in [place] switched to something that is similar to HCTZ.   c/w Amlodipine 5mg qd for now.   monitor BP

## 2021-05-22 NOTE — DIETITIAN INITIAL EVALUATION ADULT. - PROBLEM SELECTOR PLAN 1
hypoglycemia due to ARTIS and decreased oral intake.   unclear dose of Glimepiride, but it was prescribed in [place], might be a different medication.   will need diabetic education, RD consult.  endo emailed, patient would need medication adjustment.   while in house, start on low ISS.   check A1c  check TSH  monitor for further hypoglycemia.

## 2021-05-22 NOTE — PROGRESS NOTE ADULT - SUBJECTIVE AND OBJECTIVE BOX
[facility] Division of Hospital Medicine  Vonnie Cornell DO  Pager (ADARSH-F, 9G-5P): [phone]      Patient is a 65y old  Female who presents with a chief complaint of hypoglycemia, AMS, slurred speech (22 May 2021 10:06)      SUBJECTIVE / OVERNIGHT EVENTS: No acute events overnight. No complaints of chest pain, SOB, N/V. Reading Bible she says to keep her spirits lifted and denies depression.    MEDICATIONS  (STANDING):  amLODIPine   Tablet 5 milliGRAM(s) Oral daily  aspirin enteric coated 81 milliGRAM(s) Oral daily  atorvastatin 20 milliGRAM(s) Oral at bedtime  cefTRIAXone   IVPB 1000 milliGRAM(s) IV Intermittent every 24 hours  cefTRIAXone   IVPB      dextrose 40% Gel 15 Gram(s) Oral once  dextrose 5%. 1000 milliLiter(s) (50 mL/Hr) IV Continuous <Continuous>  dextrose 5%. 1000 milliLiter(s) (100 mL/Hr) IV Continuous <Continuous>  dextrose 50% Injectable 25 Gram(s) IV Push once  dextrose 50% Injectable 12.5 Gram(s) IV Push once  dextrose 50% Injectable 25 Gram(s) IV Push once  glucagon  Injectable 1 milliGRAM(s) IntraMuscular once  insulin lispro (ADMELOG) corrective regimen sliding scale   SubCutaneous three times a day before meals  insulin lispro (ADMELOG) corrective regimen sliding scale   SubCutaneous at bedtime  melatonin 6 milliGRAM(s) Oral at bedtime    MEDICATIONS  (PRN):  acetaminophen   Tablet .. 650 milliGRAM(s) Oral every 6 hours PRN Temp greater or equal to 38C (100.4F)      CAPILLARY BLOOD GLUCOSE      POCT Blood Glucose.: 121 mg/dL (22 May 2021 11:25)  POCT Blood Glucose.: 109 mg/dL (22 May 2021 07:57)  POCT Blood Glucose.: 165 mg/dL (21 May 2021 21:46)  POCT Blood Glucose.: 87 mg/dL (21 May 2021 17:01)    I&O's Summary      PHYSICAL EXAM:  Vital Signs Last 24 Hrs  T(C): 36.9 (22 May 2021 09:22), Max: 37.4 (21 May 2021 17:15)  T(F): 98.4 (22 May 2021 09:22), Max: 99.3 (21 May 2021 17:15)  HR: 86 (22 May 2021 09:22) (78 - 93)  BP: 141/64 (22 May 2021 09:22) (121/60 - 141/64)  BP(mean): 81 (22 May 2021 09:22) (81 - 81)  RR: 18 (22 May 2021 09:22) (18 - 18)  SpO2: 100% (22 May 2021 09:22) (98% - 100%)    CONSTITUTIONAL: NAD, on room air  HEENT: sclera clear, MMM, eyes tracking  RESPIRATORY: Normal respiratory effort; lungs are clear to auscultation bilaterally  CARDIOVASCULAR: S1/S2, RRR, no murmurs appreciated; No lower extremity edema  ABDOMEN: soft, Nontender, nondistended, normoactive bowel sounds, no rebound/guarding  PSYCH: calm, cooperative  NEUROLOGY: awake, alert, no gross deficits, moving all extremities  SKIN: No rashes; no palpable lesions    LABS:                        12.3   8.32  )-----------( 255      ( 22 May 2021 06:51 )             37.5     05-22    136  |  101  |  19  ----------------------------<  111<H>  3.7   |  25  |  1.08    Ca    9.1      22 May 2021 06:51  Phos  3.2     05-22  Mg     1.9     05-22                Culture - Blood (collected 20 May 2021 23:05)  Source: .Blood Blood  Gram Stain (21 May 2021 12:55):    Growth in aerobic bottle:    Gram Negative Rods  Preliminary Report (22 May 2021 08:49):    Growth in aerobic bottle: Escherichia coli    MDRO detected in BCID PCR, resistance marker = CTX-M (ESBL)    ***Blood Panel PCR results on this specimen are available    approximately 3 hours after the Gram stain result.***    Gram stain, PCR, and/or culture results may not always    correspond due to difference in methodologies.    ************************************************************    This PCR assay was performed by multiplex PCR. This    Assay tests for 66 bacterial and resistance gene targets.    Please refer to the [facility] test directory    at [email or web address] for details.  Organism: Blood Culture PCR (21 May 2021 14:47)  Organism: Blood Culture PCR (21 May 2021 14:47)    Culture - Blood (collected 20 May 2021 23:05)  Source: .Blood Blood  Preliminary Report (22 May 2021 01:02):    No growth to date.    Culture - Urine (collected 20 May 2021 12:25)  Source: .Urine Clean Catch (Midstream)  Preliminary Report (21 May 2021 17:27):    >100,000 CFU/ml Escherichia coli        RADIOLOGY & ADDITIONAL TESTS:  Results Reviewed:   Imaging Personally Reviewed:  Electrocardiogram Personally Reviewed:    COORDINATION OF CARE:  Care Discussed with Consultants/Other Providers [Y/N]:  Prior or Outpatient Records Reviewed [Y/N]:

## 2021-05-22 NOTE — DIETITIAN INITIAL EVALUATION ADULT. - OTHER INFO
Patient is a 65F DM2, HTN, KASIA, depression presented with AMS and slurring of speech in setting of hypoglycemic event with prolonged dysarthria after correction of hypoglycemia, concern for acute neurologic event also noted to have GNR bacteremia likely due to pyelonephritis. Met with patient at bedside.  No GI distress (nausea/vomiting/diarrhea/constipation.) No reported difficulties chewing and swallowing.  NKFA. UBW 200lb, weight obtained on this admission 214.2lb 5/21.  Now 200lb per bedscale 5/22, which was zeroed out during encounter. Endo following, HbA1c obtained on this admission 7.1%., on gliclazide 60 mg BID. Noted possible plans to d/c on Metformin.  She reports good PO intake prior to admission and limiting intake of carbohydrates. RD provided the patient with extensive DM diet education; including, carb counting, label reading, and meal planning. Carbohydrate sources extensively reviewed and better choices encouraged. Mixed meals also encouraged to promote glycemic control. Written materials provided on all topics discussed.  Suggest outpatient follow up with an Endocrinologist and Dietitian to ensure long-term DM diet comprehension and compliance. RD remains available, re-consult as needed.

## 2021-05-22 NOTE — PROGRESS NOTE ADULT - PROBLEM SELECTOR PLAN 1
Fever 102.9 with WBC elevation 11, HR >90 with AMS and hypoglycemia meeting sepsis criteria soon after admission.  Had CVA tenderness (now resolved) and dysuria.    Bcx and Ucx with Ecoli, sensitivities pending  - c/w ceftriaxone (5/20-), D3

## 2021-05-22 NOTE — DIETITIAN INITIAL EVALUATION ADULT. - PERTINENT MEDS FT
MEDICATIONS  (STANDING):  amLODIPine   Tablet 5 milliGRAM(s) Oral daily  aspirin enteric coated 81 milliGRAM(s) Oral daily  atorvastatin 20 milliGRAM(s) Oral at bedtime  cefTRIAXone   IVPB 1000 milliGRAM(s) IV Intermittent every 24 hours  cefTRIAXone   IVPB      dextrose 40% Gel 15 Gram(s) Oral once  dextrose 5%. 1000 milliLiter(s) (50 mL/Hr) IV Continuous <Continuous>  dextrose 5%. 1000 milliLiter(s) (100 mL/Hr) IV Continuous <Continuous>  dextrose 50% Injectable 25 Gram(s) IV Push once  dextrose 50% Injectable 12.5 Gram(s) IV Push once  dextrose 50% Injectable 25 Gram(s) IV Push once  glucagon  Injectable 1 milliGRAM(s) IntraMuscular once  insulin lispro (ADMELOG) corrective regimen sliding scale   SubCutaneous three times a day before meals  insulin lispro (ADMELOG) corrective regimen sliding scale   SubCutaneous at bedtime  melatonin 6 milliGRAM(s) Oral at bedtime

## 2021-05-22 NOTE — DIETITIAN INITIAL EVALUATION ADULT. - PERTINENT LABORATORY DATA
05-22 Na136 mmol/L Glu 111 mg/dL<H> K+ 3.7 mmol/L Cr  1.08 mg/dL BUN 19 mg/dL 05-22 Phos 3.2 mg/dL 05-20 Alb 4.1 g/dL

## 2021-05-23 LAB
-  AMIKACIN: SIGNIFICANT CHANGE UP
-  AMPICILLIN/SULBACTAM: SIGNIFICANT CHANGE UP
-  AMPICILLIN: SIGNIFICANT CHANGE UP
-  AZTREONAM: SIGNIFICANT CHANGE UP
-  CEFAZOLIN: SIGNIFICANT CHANGE UP
-  CEFEPIME: SIGNIFICANT CHANGE UP
-  CEFOXITIN: SIGNIFICANT CHANGE UP
-  CEFTRIAXONE: SIGNIFICANT CHANGE UP
-  CIPROFLOXACIN: SIGNIFICANT CHANGE UP
-  ERTAPENEM: SIGNIFICANT CHANGE UP
-  GENTAMICIN: SIGNIFICANT CHANGE UP
-  IMIPENEM: SIGNIFICANT CHANGE UP
-  LEVOFLOXACIN: SIGNIFICANT CHANGE UP
-  MEROPENEM: SIGNIFICANT CHANGE UP
-  PIPERACILLIN/TAZOBACTAM: SIGNIFICANT CHANGE UP
-  TOBRAMYCIN: SIGNIFICANT CHANGE UP
-  TRIMETHOPRIM/SULFAMETHOXAZOLE: SIGNIFICANT CHANGE UP
ANION GAP SERPL CALC-SCNC: 14 MMOL/L — SIGNIFICANT CHANGE UP (ref 7–14)
BUN SERPL-MCNC: 12 MG/DL — SIGNIFICANT CHANGE UP (ref 7–23)
CALCIUM SERPL-MCNC: 9.2 MG/DL — SIGNIFICANT CHANGE UP (ref 8.4–10.5)
CHLORIDE SERPL-SCNC: 102 MMOL/L — SIGNIFICANT CHANGE UP (ref 98–107)
CO2 SERPL-SCNC: 23 MMOL/L — SIGNIFICANT CHANGE UP (ref 22–31)
CREAT SERPL-MCNC: 0.89 MG/DL — SIGNIFICANT CHANGE UP (ref 0.5–1.3)
CULTURE RESULTS: SIGNIFICANT CHANGE UP
GLUCOSE BLDC GLUCOMTR-MCNC: 121 MG/DL — HIGH (ref 70–99)
GLUCOSE BLDC GLUCOMTR-MCNC: 123 MG/DL — HIGH (ref 70–99)
GLUCOSE BLDC GLUCOMTR-MCNC: 128 MG/DL — HIGH (ref 70–99)
GLUCOSE BLDC GLUCOMTR-MCNC: 131 MG/DL — HIGH (ref 70–99)
GLUCOSE BLDC GLUCOMTR-MCNC: 94 MG/DL — SIGNIFICANT CHANGE UP (ref 70–99)
GLUCOSE SERPL-MCNC: 117 MG/DL — HIGH (ref 70–99)
HCT VFR BLD CALC: 36.1 % — SIGNIFICANT CHANGE UP (ref 34.5–45)
HGB BLD-MCNC: 12 G/DL — SIGNIFICANT CHANGE UP (ref 11.5–15.5)
MAGNESIUM SERPL-MCNC: 1.7 MG/DL — SIGNIFICANT CHANGE UP (ref 1.6–2.6)
MCHC RBC-ENTMCNC: 27.7 PG — SIGNIFICANT CHANGE UP (ref 27–34)
MCHC RBC-ENTMCNC: 33.2 GM/DL — SIGNIFICANT CHANGE UP (ref 32–36)
MCV RBC AUTO: 83.4 FL — SIGNIFICANT CHANGE UP (ref 80–100)
METHOD TYPE: SIGNIFICANT CHANGE UP
NRBC # BLD: 0 /100 WBCS — SIGNIFICANT CHANGE UP
NRBC # FLD: 0 K/UL — SIGNIFICANT CHANGE UP
ORGANISM # SPEC MICROSCOPIC CNT: SIGNIFICANT CHANGE UP
PHOSPHATE SERPL-MCNC: 3.4 MG/DL — SIGNIFICANT CHANGE UP (ref 2.5–4.5)
PLATELET # BLD AUTO: 271 K/UL — SIGNIFICANT CHANGE UP (ref 150–400)
POTASSIUM SERPL-MCNC: 3.9 MMOL/L — SIGNIFICANT CHANGE UP (ref 3.5–5.3)
POTASSIUM SERPL-SCNC: 3.9 MMOL/L — SIGNIFICANT CHANGE UP (ref 3.5–5.3)
RBC # BLD: 4.33 M/UL — SIGNIFICANT CHANGE UP (ref 3.8–5.2)
RBC # FLD: 14 % — SIGNIFICANT CHANGE UP (ref 10.3–14.5)
SODIUM SERPL-SCNC: 139 MMOL/L — SIGNIFICANT CHANGE UP (ref 135–145)
SPECIMEN SOURCE: SIGNIFICANT CHANGE UP
WBC # BLD: 7.96 K/UL — SIGNIFICANT CHANGE UP (ref 3.8–10.5)
WBC # FLD AUTO: 7.96 K/UL — SIGNIFICANT CHANGE UP (ref 3.8–10.5)

## 2021-05-23 PROCEDURE — 99233 SBSQ HOSP IP/OBS HIGH 50: CPT

## 2021-05-23 RX ORDER — ZOLPIDEM TARTRATE 10 MG/1
5 TABLET ORAL AT BEDTIME
Refills: 0 | Status: DISCONTINUED | OUTPATIENT
Start: 2021-05-23 | End: 2021-05-28

## 2021-05-23 RX ORDER — LANOLIN ALCOHOL/MO/W.PET/CERES
3 CREAM (GRAM) TOPICAL ONCE
Refills: 0 | Status: COMPLETED | OUTPATIENT
Start: 2021-05-23 | End: 2021-05-23

## 2021-05-23 RX ADMIN — ERTAPENEM SODIUM 120 MILLIGRAM(S): 1 INJECTION, POWDER, LYOPHILIZED, FOR SOLUTION INTRAMUSCULAR; INTRAVENOUS at 17:23

## 2021-05-23 RX ADMIN — Medication 6 MILLIGRAM(S): at 21:37

## 2021-05-23 RX ADMIN — AMLODIPINE BESYLATE 5 MILLIGRAM(S): 2.5 TABLET ORAL at 05:09

## 2021-05-23 RX ADMIN — Medication 3 MILLIGRAM(S): at 02:53

## 2021-05-23 RX ADMIN — ZOLPIDEM TARTRATE 5 MILLIGRAM(S): 10 TABLET ORAL at 21:37

## 2021-05-23 RX ADMIN — ATORVASTATIN CALCIUM 20 MILLIGRAM(S): 80 TABLET, FILM COATED ORAL at 21:37

## 2021-05-23 RX ADMIN — Medication 81 MILLIGRAM(S): at 12:18

## 2021-05-23 NOTE — PROGRESS NOTE ADULT - SUBJECTIVE AND OBJECTIVE BOX
[facility] Division of Hospital Medicine  Vonnie Cornell DO  Pager (ADARSH-YVROSE, 1M-2F): [phone]      Patient is a 65y old  Female who presents with a chief complaint of hypoglycemia, AMS, slurred speech (22 May 2021 12:58)      SUBJECTIVE / OVERNIGHT EVENTS: No events overnight. Pain in side is improved, no chest pain, or SOB, no N/V/D    MEDICATIONS  (STANDING):  amLODIPine   Tablet 5 milliGRAM(s) Oral daily  aspirin enteric coated 81 milliGRAM(s) Oral daily  atorvastatin 20 milliGRAM(s) Oral at bedtime  dextrose 40% Gel 15 Gram(s) Oral once  dextrose 5%. 1000 milliLiter(s) (50 mL/Hr) IV Continuous <Continuous>  dextrose 50% Injectable 25 Gram(s) IV Push once  dextrose 50% Injectable 12.5 Gram(s) IV Push once  dextrose 50% Injectable 25 Gram(s) IV Push once  ertapenem  IVPB 1000 milliGRAM(s) IV Intermittent every 24 hours  glucagon  Injectable 1 milliGRAM(s) IntraMuscular once  insulin lispro (ADMELOG) corrective regimen sliding scale   SubCutaneous three times a day before meals  insulin lispro (ADMELOG) corrective regimen sliding scale   SubCutaneous at bedtime  melatonin 6 milliGRAM(s) Oral at bedtime    MEDICATIONS  (PRN):  acetaminophen   Tablet .. 650 milliGRAM(s) Oral every 6 hours PRN Temp greater or equal to 38C (100.4F)  zolpidem 5 milliGRAM(s) Oral at bedtime PRN Insomnia      CAPILLARY BLOOD GLUCOSE      POCT Blood Glucose.: 128 mg/dL (23 May 2021 12:17)  POCT Blood Glucose.: 121 mg/dL (23 May 2021 11:49)  POCT Blood Glucose.: 131 mg/dL (23 May 2021 08:09)  POCT Blood Glucose.: 114 mg/dL (22 May 2021 21:56)  POCT Blood Glucose.: 86 mg/dL (22 May 2021 16:54)    I&O's Summary    23 May 2021 07:01  -  23 May 2021 13:30  --------------------------------------------------------  IN: 200 mL / OUT: 0 mL / NET: 200 mL        PHYSICAL EXAM:  Vital Signs Last 24 Hrs  T(C): 36.7 (23 May 2021 09:01), Max: 37 (22 May 2021 21:07)  T(F): 98.1 (23 May 2021 09:01), Max: 98.6 (22 May 2021 21:07)  HR: 71 (23 May 2021 09:01) (71 - 86)  BP: 168/94 (23 May 2021 09:01) (135/61 - 168/94)  BP(mean): --  RR: 17 (23 May 2021 09:01) (17 - 18)  SpO2: 100% (23 May 2021 09:01) (100% - 100%)    CONSTITUTIONAL: NAD, on room air  HEENT: sclera clear, MMM, eyes tracking  RESPIRATORY: Normal respiratory effort; lungs are clear to auscultation bilaterally  CARDIOVASCULAR: S1/S2, RRR, no murmurs appreciated; No lower extremity edema  ABDOMEN: soft, Nontender, nondistended, normoactive bowel sounds, no rebound/guarding  PSYCH: calm, cooperative  NEUROLOGY: awake, alert, no gross deficits, moving all extremities  SKIN: No rashes; no palpable lesions    LABS:                        12.0   7.96  )-----------( 271      ( 23 May 2021 06:24 )             36.1     05-23    139  |  102  |  12  ----------------------------<  117<H>  3.9   |  23  |  0.89    Ca    9.2      23 May 2021 06:24  Phos  3.4     05-23  Mg     1.7     05-23                Culture - Blood (collected 20 May 2021 23:05)  Source: .Blood Blood  Gram Stain (21 May 2021 12:55):    Growth in aerobic bottle:    Gram Negative Rods  Final Report (23 May 2021 07:54):    Growth in aerobic bottle: Escherichia coli ESBL    MDRO detected in BCID PCR, resistance marker = CTX-M (ESBL)    ***Blood Panel PCR results on this specimen are available    approximately 3 hours after the Gram stain result.***    Gram stain, PCR, and/or culture results may not always    correspond due to difference in methodologies.    ************************************************************    This PCR assay was performed by multiplex PCR. This    Assay tests for 66 bacterial and resistance gene targets.    Please refer to the [facility] test directory    at [email or web address] for details.  Organism: Blood Culture PCR  Escherichia coli ESBL (23 May 2021 07:54)  Organism: Escherichia coli ESBL (23 May 2021 07:54)  Organism: Blood Culture PCR (23 May 2021 07:54)    Culture - Blood (collected 20 May 2021 23:05)  Source: .Blood Blood  Preliminary Report (22 May 2021 01:02):    No growth to date.        RADIOLOGY & ADDITIONAL TESTS:  Results Reviewed:   Imaging Personally Reviewed:  Electrocardiogram Personally Reviewed:    COORDINATION OF CARE:  Care Discussed with Consultants/Other Providers [Y/N]:  Prior or Outpatient Records Reviewed [Y/N]:

## 2021-05-23 NOTE — PROGRESS NOTE ADULT - PROBLEM SELECTOR PLAN 1
Fever 102.9 with WBC elevation 11, HR >90 with AMS and hypoglycemia meeting sepsis criteria soon after admission.  Had CVA tenderness (now resolved) and dysuria.    Bcx and Ucx with ESBL Ecoli,  on ertapenem day 2  repeat Bcx

## 2021-05-23 NOTE — PROVIDER CONTACT NOTE (CRITICAL VALUE NOTIFICATION) - ASSESSMENT
Patient awake, alert and oriented x4. Able to make needs known. BATISTA x4. Denies c/o discomfort. Afebrile. VSS

## 2021-05-23 NOTE — PROGRESS NOTE ADULT - PROBLEM SELECTOR PLAN 3
prolong dysarthria according to ED, currently speech wnl and no focal deficits, possible related to sepsis  - c/w asa and statin   - c/w tele, EKG NSR  - CTH neg, MRI brain negative for infarct  - CTA H/N: no stenosis   TTE was performed and showed St 1 DD, EF 70%  - passed dysphagia screen, c/w diet

## 2021-05-23 NOTE — PROVIDER CONTACT NOTE (CRITICAL VALUE NOTIFICATION) - SITUATION
Blood culture result, drawn on 5/20/21, Final Growth Ecoli ESBL mdro detected in BCID PCR. And Urine culture result, Positive , Final greater than 100,000 Ecoli ESBL.

## 2021-05-24 LAB
ANION GAP SERPL CALC-SCNC: 15 MMOL/L — HIGH (ref 7–14)
BUN SERPL-MCNC: 13 MG/DL — SIGNIFICANT CHANGE UP (ref 7–23)
CALCIUM SERPL-MCNC: 9.8 MG/DL — SIGNIFICANT CHANGE UP (ref 8.4–10.5)
CHLORIDE SERPL-SCNC: 103 MMOL/L — SIGNIFICANT CHANGE UP (ref 98–107)
CO2 SERPL-SCNC: 22 MMOL/L — SIGNIFICANT CHANGE UP (ref 22–31)
CREAT SERPL-MCNC: 0.95 MG/DL — SIGNIFICANT CHANGE UP (ref 0.5–1.3)
GLUCOSE BLDC GLUCOMTR-MCNC: 103 MG/DL — HIGH (ref 70–99)
GLUCOSE BLDC GLUCOMTR-MCNC: 108 MG/DL — HIGH (ref 70–99)
GLUCOSE BLDC GLUCOMTR-MCNC: 128 MG/DL — HIGH (ref 70–99)
GLUCOSE BLDC GLUCOMTR-MCNC: 133 MG/DL — HIGH (ref 70–99)
GLUCOSE BLDC GLUCOMTR-MCNC: 93 MG/DL — SIGNIFICANT CHANGE UP (ref 70–99)
GLUCOSE SERPL-MCNC: 113 MG/DL — HIGH (ref 70–99)
HCT VFR BLD CALC: 43.5 % — SIGNIFICANT CHANGE UP (ref 34.5–45)
HGB BLD-MCNC: 13.8 G/DL — SIGNIFICANT CHANGE UP (ref 11.5–15.5)
MAGNESIUM SERPL-MCNC: 1.9 MG/DL — SIGNIFICANT CHANGE UP (ref 1.6–2.6)
MCHC RBC-ENTMCNC: 27.3 PG — SIGNIFICANT CHANGE UP (ref 27–34)
MCHC RBC-ENTMCNC: 31.7 GM/DL — LOW (ref 32–36)
MCV RBC AUTO: 86.1 FL — SIGNIFICANT CHANGE UP (ref 80–100)
NRBC # BLD: 0 /100 WBCS — SIGNIFICANT CHANGE UP
NRBC # FLD: 0 K/UL — SIGNIFICANT CHANGE UP
PHOSPHATE SERPL-MCNC: 3.8 MG/DL — SIGNIFICANT CHANGE UP (ref 2.5–4.5)
PLATELET # BLD AUTO: 286 K/UL — SIGNIFICANT CHANGE UP (ref 150–400)
POTASSIUM SERPL-MCNC: 4.4 MMOL/L — SIGNIFICANT CHANGE UP (ref 3.5–5.3)
POTASSIUM SERPL-SCNC: 4.4 MMOL/L — SIGNIFICANT CHANGE UP (ref 3.5–5.3)
RBC # BLD: 5.05 M/UL — SIGNIFICANT CHANGE UP (ref 3.8–5.2)
RBC # FLD: 14.5 % — SIGNIFICANT CHANGE UP (ref 10.3–14.5)
SODIUM SERPL-SCNC: 140 MMOL/L — SIGNIFICANT CHANGE UP (ref 135–145)
WBC # BLD: 8.14 K/UL — SIGNIFICANT CHANGE UP (ref 3.8–10.5)
WBC # FLD AUTO: 8.14 K/UL — SIGNIFICANT CHANGE UP (ref 3.8–10.5)

## 2021-05-24 PROCEDURE — 99233 SBSQ HOSP IP/OBS HIGH 50: CPT

## 2021-05-24 RX ORDER — NYSTATIN CREAM 100000 [USP'U]/G
1 CREAM TOPICAL
Refills: 0 | Status: DISCONTINUED | OUTPATIENT
Start: 2021-05-24 | End: 2021-05-24

## 2021-05-24 RX ORDER — ACETAMINOPHEN 500 MG
650 TABLET ORAL EVERY 6 HOURS
Refills: 0 | Status: DISCONTINUED | OUTPATIENT
Start: 2021-05-24 | End: 2021-05-28

## 2021-05-24 RX ORDER — ERTAPENEM SODIUM 1 G/1
1000 INJECTION, POWDER, LYOPHILIZED, FOR SOLUTION INTRAMUSCULAR; INTRAVENOUS EVERY 24 HOURS
Refills: 0 | Status: DISCONTINUED | OUTPATIENT
Start: 2021-05-24 | End: 2021-05-25

## 2021-05-24 RX ADMIN — Medication 325 MILLIGRAM(S): at 17:43

## 2021-05-24 RX ADMIN — Medication 81 MILLIGRAM(S): at 11:40

## 2021-05-24 RX ADMIN — ZOLPIDEM TARTRATE 5 MILLIGRAM(S): 10 TABLET ORAL at 22:00

## 2021-05-24 RX ADMIN — NYSTATIN CREAM 1 APPLICATION(S): 100000 CREAM TOPICAL at 17:33

## 2021-05-24 RX ADMIN — ATORVASTATIN CALCIUM 20 MILLIGRAM(S): 80 TABLET, FILM COATED ORAL at 22:00

## 2021-05-24 RX ADMIN — Medication 1 APPLICATORFUL: at 21:51

## 2021-05-24 RX ADMIN — ERTAPENEM SODIUM 120 MILLIGRAM(S): 1 INJECTION, POWDER, LYOPHILIZED, FOR SOLUTION INTRAMUSCULAR; INTRAVENOUS at 17:43

## 2021-05-24 RX ADMIN — Medication 6 MILLIGRAM(S): at 22:00

## 2021-05-24 RX ADMIN — Medication 650 MILLIGRAM(S): at 18:13

## 2021-05-24 RX ADMIN — AMLODIPINE BESYLATE 5 MILLIGRAM(S): 2.5 TABLET ORAL at 06:15

## 2021-05-24 NOTE — PROGRESS NOTE ADULT - PROBLEM SELECTOR PLAN 1
Fever 102.9 with WBC elevation 11, HR >90 with AMS and hypoglycemia meeting sepsis criteria soon after admission.  Had CVA tenderness (now resolved) and dysuria.    Bcx and Ucx with ESBL Ecoli,  on ertapenem day 2  repeat Bcx Fever 102.9 with WBC elevation 11, HR >90 with AMS and hypoglycemia meeting sepsis criteria soon after admission.  Had CVA tenderness (now resolved) and dysuria.    Bcx and Ucx with ESBL Ecoli,  on ertapenem day 3/7  Discussed with ID, patient can be treated for 7 days of Ertapenem.   repeat Bcx pending Fever 102.9 with WBC elevation 11, HR >90 with AMS and hypoglycemia meeting sepsis criteria soon after admission.  Had CVA tenderness (now resolved) and dysuria.  Now found to have ESBL bacteremia likely 2/2 pyelonephritis   - Bcx and Ucx with ESBL Ecoli,  on ertapenem day 3/7  - Discussed with ID, patient can be treated for 7 days of Ertapenem.   - Repeat Bcx pending  - Patient endorsing vaginal itching - start on clotrimazole cream

## 2021-05-24 NOTE — PROGRESS NOTE ADULT - PROBLEM SELECTOR PLAN 3
prolong dysarthria according to ED, currently speech wnl and no focal deficits, possible related to sepsis  - c/w asa and statin   - c/w tele, EKG NSR  - CTH neg, MRI brain negative for infarct  - CTA H/N: no stenosis   TTE was performed and showed St 1 DD, EF 70%  - passed dysphagia screen, c/w diet dysarthria according to ED, currently speech wnl and no focal deficits, possible related to sepsis  - c/w asa and statin   - c/w tele, EKG NSR  - CTH neg, MRI brain negative for infarct  - CTA H/N: no stenosis   TTE was performed and showed St 1 DD, EF 70%  - passed dysphagia screen, c/w diet

## 2021-05-24 NOTE — PROGRESS NOTE ADULT - SUBJECTIVE AND OBJECTIVE BOX
[facility] Division of Hospital Medicine  Chayito Nix MD  Pager: [phone]      Patient is a 65y old  Female who presents with a chief complaint of hypoglycemia, AMS, slurred speech (23 May 2021 13:29)      SUBJECTIVE / OVERNIGHT EVENTS:    MEDICATIONS  (STANDING):  amLODIPine   Tablet 5 milliGRAM(s) Oral daily  aspirin enteric coated 81 milliGRAM(s) Oral daily  atorvastatin 20 milliGRAM(s) Oral at bedtime  dextrose 40% Gel 15 Gram(s) Oral once  dextrose 5%. 1000 milliLiter(s) (50 mL/Hr) IV Continuous <Continuous>  dextrose 50% Injectable 25 Gram(s) IV Push once  dextrose 50% Injectable 12.5 Gram(s) IV Push once  dextrose 50% Injectable 25 Gram(s) IV Push once  ertapenem  IVPB 1000 milliGRAM(s) IV Intermittent every 24 hours  glucagon  Injectable 1 milliGRAM(s) IntraMuscular once  insulin lispro (ADMELOG) corrective regimen sliding scale   SubCutaneous three times a day before meals  insulin lispro (ADMELOG) corrective regimen sliding scale   SubCutaneous at bedtime  melatonin 6 milliGRAM(s) Oral at bedtime    MEDICATIONS  (PRN):  acetaminophen   Tablet .. 650 milliGRAM(s) Oral every 6 hours PRN Temp greater or equal to 38C (100.4F)  zolpidem 5 milliGRAM(s) Oral at bedtime PRN Insomnia      CAPILLARY BLOOD GLUCOSE      POCT Blood Glucose.: 108 mg/dL (24 May 2021 11:36)  POCT Blood Glucose.: 128 mg/dL (24 May 2021 08:04)  POCT Blood Glucose.: 133 mg/dL (24 May 2021 06:41)  POCT Blood Glucose.: 123 mg/dL (23 May 2021 21:22)  POCT Blood Glucose.: 94 mg/dL (23 May 2021 16:44)    I&O's Summary    23 May 2021 07:01  -  24 May 2021 07:00  --------------------------------------------------------  IN: 1000 mL / OUT: 1 mL / NET: 999 mL        PHYSICAL EXAM:  Vital Signs Last 24 Hrs  T(C): 36.6 (24 May 2021 08:00), Max: 36.8 (23 May 2021 17:29)  T(F): 97.8 (24 May 2021 08:00), Max: 98.3 (23 May 2021 17:29)  HR: 77 (24 May 2021 08:00) (77 - 90)  BP: 141/83 (24 May 2021 08:00) (127/77 - 141/83)  BP(mean): --  RR: 17 (24 May 2021 08:00) (17 - 18)  SpO2: 100% (24 May 2021 08:00) (100% - 100%)    CONSTITUTIONAL: NAD, well-developed, well-groomed  EYES: PERRLA; conjunctiva and sclera clear  ENMT: Moist oral mucosa, no pharyngeal injection or exudates; normal dentition  NECK: Supple, no palpable masses; no thyromegaly  RESPIRATORY: Normal respiratory effort; lungs are clear to auscultation bilaterally  CARDIOVASCULAR:  S1/S2; No lower extremity edema  ABDOMEN: Nontender to palpation, normoactive bowel sounds, no rebound/guarding  MUSCULOSKELETAL:  no clubbing or cyanosis of digits; no joint swelling or tenderness to palpation  PSYCH: A+O to person, place, and time; affect appropriate  NEUROLOGY: no focal deficits  SKIN: No rashes; no palpable lesions    LABS:                        13.8   8.14  )-----------( 286      ( 24 May 2021 06:54 )             43.5     05-24    140  |  103  |  13  ----------------------------<  113<H>  4.4   |  22  |  0.95    Ca    9.8      24 May 2021 06:54  Phos  3.8     05-24  Mg     1.9     05-24                  RADIOLOGY & ADDITIONAL TESTS:  Results Reviewed:   Imaging Personally Reviewed:  Electrocardiogram Personally Reviewed:    COORDINATION OF CARE:  Care Discussed with Consultants/Other Providers [Y/N]:  Prior or Outpatient Records Reviewed [Y/N]:   [facility] Division of Hospital Medicine  Chayito Nix MD  Pager: [phone]      Patient is a 65y old  Female who presents with a chief complaint of hypoglycemia, AMS, slurred speech (23 May 2021 13:29)      SUBJECTIVE / OVERNIGHT EVENTS: patient seen and examined. endorsing some mild L sided back pain, denies burning on urination. States that she has vaginal itching, feels like yeast infection, did not check for discharge yet. Asking for cream. Discussed that patient has blood stream infection and will need IV antibiotics x7 days. She understands.     MEDICATIONS  (STANDING):  amLODIPine   Tablet 5 milliGRAM(s) Oral daily  aspirin enteric coated 81 milliGRAM(s) Oral daily  atorvastatin 20 milliGRAM(s) Oral at bedtime  dextrose 40% Gel 15 Gram(s) Oral once  dextrose 5%. 1000 milliLiter(s) (50 mL/Hr) IV Continuous <Continuous>  dextrose 50% Injectable 25 Gram(s) IV Push once  dextrose 50% Injectable 12.5 Gram(s) IV Push once  dextrose 50% Injectable 25 Gram(s) IV Push once  ertapenem  IVPB 1000 milliGRAM(s) IV Intermittent every 24 hours  glucagon  Injectable 1 milliGRAM(s) IntraMuscular once  insulin lispro (ADMELOG) corrective regimen sliding scale   SubCutaneous three times a day before meals  insulin lispro (ADMELOG) corrective regimen sliding scale   SubCutaneous at bedtime  melatonin 6 milliGRAM(s) Oral at bedtime    MEDICATIONS  (PRN):  acetaminophen   Tablet .. 650 milliGRAM(s) Oral every 6 hours PRN Temp greater or equal to 38C (100.4F)  zolpidem 5 milliGRAM(s) Oral at bedtime PRN Insomnia      CAPILLARY BLOOD GLUCOSE      POCT Blood Glucose.: 108 mg/dL (24 May 2021 11:36)  POCT Blood Glucose.: 128 mg/dL (24 May 2021 08:04)  POCT Blood Glucose.: 133 mg/dL (24 May 2021 06:41)  POCT Blood Glucose.: 123 mg/dL (23 May 2021 21:22)  POCT Blood Glucose.: 94 mg/dL (23 May 2021 16:44)    I&O's Summary    23 May 2021 07:01  -  24 May 2021 07:00  --------------------------------------------------------  IN: 1000 mL / OUT: 1 mL / NET: 999 mL        PHYSICAL EXAM:  Vital Signs Last 24 Hrs  T(C): 36.6 (24 May 2021 08:00), Max: 36.8 (23 May 2021 17:29)  T(F): 97.8 (24 May 2021 08:00), Max: 98.3 (23 May 2021 17:29)  HR: 77 (24 May 2021 08:00) (77 - 90)  BP: 141/83 (24 May 2021 08:00) (127/77 - 141/83)  BP(mean): --  RR: 17 (24 May 2021 08:00) (17 - 18)  SpO2: 100% (24 May 2021 08:00) (100% - 100%)    CONSTITUTIONAL: NAD, well-developed, well-groomed  ENMT: Moist oral mucosay  RESPIRATORY: Normal respiratory effort; lungs are clear to auscultation bilaterally  CARDIOVASCULAR:  S1/S2; No lower extremity edema  ABDOMEN: Nontender to palpation, normoactive bowel sounds, no rebound/guarding. NO CVA tenderness   MUSCULOSKELETAL:  no clubbing or cyanosis of digits; no joint swelling or tenderness to palpation  PSYCH: A+O to person, place, and time; affect appropriate  NEUROLOGY: no focal deficits  SKIN: No rashes; no palpable lesions    LABS:                        13.8   8.14  )-----------( 286      ( 24 May 2021 06:54 )             43.5     05-24    140  |  103  |  13  ----------------------------<  113<H>  4.4   |  22  |  0.95    Ca    9.8      24 May 2021 06:54  Phos  3.8     05-24  Mg     1.9     05-24                  RADIOLOGY & ADDITIONAL TESTS:  Results Reviewed:   Imaging Personally Reviewed:  Electrocardiogram Personally Reviewed:    COORDINATION OF CARE:  Care Discussed with Consultants/Other Providers [Y/N]:  Prior or Outpatient Records Reviewed [Y/N]:

## 2021-05-25 LAB
ANION GAP SERPL CALC-SCNC: 13 MMOL/L — SIGNIFICANT CHANGE UP (ref 7–14)
BUN SERPL-MCNC: 12 MG/DL — SIGNIFICANT CHANGE UP (ref 7–23)
CALCIUM SERPL-MCNC: 9.8 MG/DL — SIGNIFICANT CHANGE UP (ref 8.4–10.5)
CHLORIDE SERPL-SCNC: 102 MMOL/L — SIGNIFICANT CHANGE UP (ref 98–107)
CO2 SERPL-SCNC: 23 MMOL/L — SIGNIFICANT CHANGE UP (ref 22–31)
CREAT SERPL-MCNC: 0.83 MG/DL — SIGNIFICANT CHANGE UP (ref 0.5–1.3)
GLUCOSE BLDC GLUCOMTR-MCNC: 100 MG/DL — HIGH (ref 70–99)
GLUCOSE BLDC GLUCOMTR-MCNC: 104 MG/DL — HIGH (ref 70–99)
GLUCOSE BLDC GLUCOMTR-MCNC: 122 MG/DL — HIGH (ref 70–99)
GLUCOSE BLDC GLUCOMTR-MCNC: 129 MG/DL — HIGH (ref 70–99)
GLUCOSE SERPL-MCNC: 172 MG/DL — HIGH (ref 70–99)
HCT VFR BLD CALC: 39.2 % — SIGNIFICANT CHANGE UP (ref 34.5–45)
HGB BLD-MCNC: 13 G/DL — SIGNIFICANT CHANGE UP (ref 11.5–15.5)
MAGNESIUM SERPL-MCNC: 1.8 MG/DL — SIGNIFICANT CHANGE UP (ref 1.6–2.6)
MCHC RBC-ENTMCNC: 27.5 PG — SIGNIFICANT CHANGE UP (ref 27–34)
MCHC RBC-ENTMCNC: 33.2 GM/DL — SIGNIFICANT CHANGE UP (ref 32–36)
MCV RBC AUTO: 82.9 FL — SIGNIFICANT CHANGE UP (ref 80–100)
NRBC # BLD: 0 /100 WBCS — SIGNIFICANT CHANGE UP
NRBC # FLD: 0 K/UL — SIGNIFICANT CHANGE UP
PHOSPHATE SERPL-MCNC: 3.6 MG/DL — SIGNIFICANT CHANGE UP (ref 2.5–4.5)
PLATELET # BLD AUTO: 351 K/UL — SIGNIFICANT CHANGE UP (ref 150–400)
POTASSIUM SERPL-MCNC: 4 MMOL/L — SIGNIFICANT CHANGE UP (ref 3.5–5.3)
POTASSIUM SERPL-SCNC: 4 MMOL/L — SIGNIFICANT CHANGE UP (ref 3.5–5.3)
RBC # BLD: 4.73 M/UL — SIGNIFICANT CHANGE UP (ref 3.8–5.2)
RBC # FLD: 14.1 % — SIGNIFICANT CHANGE UP (ref 10.3–14.5)
SODIUM SERPL-SCNC: 138 MMOL/L — SIGNIFICANT CHANGE UP (ref 135–145)
WBC # BLD: 7.2 K/UL — SIGNIFICANT CHANGE UP (ref 3.8–10.5)
WBC # FLD AUTO: 7.2 K/UL — SIGNIFICANT CHANGE UP (ref 3.8–10.5)

## 2021-05-25 PROCEDURE — 99233 SBSQ HOSP IP/OBS HIGH 50: CPT

## 2021-05-25 RX ORDER — ERTAPENEM SODIUM 1 G/1
1000 INJECTION, POWDER, LYOPHILIZED, FOR SOLUTION INTRAMUSCULAR; INTRAVENOUS EVERY 24 HOURS
Refills: 0 | Status: DISCONTINUED | OUTPATIENT
Start: 2021-05-25 | End: 2021-05-26

## 2021-05-25 RX ORDER — AMLODIPINE BESYLATE 2.5 MG/1
10 TABLET ORAL DAILY
Refills: 0 | Status: DISCONTINUED | OUTPATIENT
Start: 2021-05-25 | End: 2021-05-28

## 2021-05-25 RX ORDER — AMLODIPINE BESYLATE 2.5 MG/1
5 TABLET ORAL ONCE
Refills: 0 | Status: COMPLETED | OUTPATIENT
Start: 2021-05-25 | End: 2021-05-25

## 2021-05-25 RX ADMIN — ZOLPIDEM TARTRATE 5 MILLIGRAM(S): 10 TABLET ORAL at 21:08

## 2021-05-25 RX ADMIN — Medication 1 APPLICATORFUL: at 21:09

## 2021-05-25 RX ADMIN — ERTAPENEM SODIUM 120 MILLIGRAM(S): 1 INJECTION, POWDER, LYOPHILIZED, FOR SOLUTION INTRAMUSCULAR; INTRAVENOUS at 17:29

## 2021-05-25 RX ADMIN — AMLODIPINE BESYLATE 5 MILLIGRAM(S): 2.5 TABLET ORAL at 12:41

## 2021-05-25 RX ADMIN — Medication 6 MILLIGRAM(S): at 21:09

## 2021-05-25 RX ADMIN — ATORVASTATIN CALCIUM 20 MILLIGRAM(S): 80 TABLET, FILM COATED ORAL at 21:07

## 2021-05-25 RX ADMIN — Medication 81 MILLIGRAM(S): at 12:41

## 2021-05-25 RX ADMIN — AMLODIPINE BESYLATE 5 MILLIGRAM(S): 2.5 TABLET ORAL at 06:12

## 2021-05-25 NOTE — PROGRESS NOTE ADULT - SUBJECTIVE AND OBJECTIVE BOX
[facility] Division of Hospital Medicine  Chayito Nix MD  Pager: [phone]      Patient is a 65y old  Female who presents with a chief complaint of hypoglycemia, AMS, slurred speech (24 May 2021 14:24)      SUBJECTIVE / OVERNIGHT EVENTS: patient states she feels generally better today. Concerned bc her BP is higher than normal, thinks she needs higher dose of BP medication. Explained we are increasing her dose from 5mg to 10mg, she is agreeable. Still with some L sided back pain. No pain on urination. Vaginal itching and discharge improved, using cream.     MEDICATIONS  (STANDING):  amLODIPine   Tablet 10 milliGRAM(s) Oral daily  aspirin enteric coated 81 milliGRAM(s) Oral daily  atorvastatin 20 milliGRAM(s) Oral at bedtime  clotrimazole 1% Vaginal Cream 1 Applicatorful Vaginal at bedtime  dextrose 40% Gel 15 Gram(s) Oral once  dextrose 5%. 1000 milliLiter(s) (50 mL/Hr) IV Continuous <Continuous>  dextrose 50% Injectable 25 Gram(s) IV Push once  dextrose 50% Injectable 12.5 Gram(s) IV Push once  dextrose 50% Injectable 25 Gram(s) IV Push once  ertapenem  IVPB 1000 milliGRAM(s) IV Intermittent every 24 hours  glucagon  Injectable 1 milliGRAM(s) IntraMuscular once  insulin lispro (ADMELOG) corrective regimen sliding scale   SubCutaneous three times a day before meals  insulin lispro (ADMELOG) corrective regimen sliding scale   SubCutaneous at bedtime  melatonin 6 milliGRAM(s) Oral at bedtime    MEDICATIONS  (PRN):  acetaminophen   Tablet .. 650 milliGRAM(s) Oral every 6 hours PRN Temp greater or equal to 38C (100.4F), Mild Pain (1 - 3), Moderate Pain (4 - 6), Severe Pain (7 - 10)  zolpidem 5 milliGRAM(s) Oral at bedtime PRN Insomnia      CAPILLARY BLOOD GLUCOSE      POCT Blood Glucose.: 122 mg/dL (25 May 2021 12:10)  POCT Blood Glucose.: 129 mg/dL (25 May 2021 07:48)  POCT Blood Glucose.: 103 mg/dL (24 May 2021 21:35)  POCT Blood Glucose.: 93 mg/dL (24 May 2021 17:08)    I&O's Summary    24 May 2021 07:01  -  25 May 2021 07:00  --------------------------------------------------------  IN: 240 mL / OUT: 550 mL / NET: -310 mL        PHYSICAL EXAM:  Vital Signs Last 24 Hrs  T(C): 36.5 (25 May 2021 12:00), Max: 37.1 (25 May 2021 08:45)  T(F): 97.7 (25 May 2021 12:00), Max: 98.7 (25 May 2021 08:45)  HR: 85 (25 May 2021 12:00) (77 - 89)  BP: 146/78 (25 May 2021 12:00) (145/74 - 172/87)  BP(mean): --  RR: 17 (25 May 2021 12:00) (16 - 18)  SpO2: 100% (25 May 2021 12:00) (100% - 100%)    CONSTITUTIONAL: NAD, well-developed, well-groomed  ENMT: Moist oral mucosay  RESPIRATORY: Normal respiratory effort; lungs are clear to auscultation bilaterally  CARDIOVASCULAR:  S1/S2; No lower extremity edema  ABDOMEN: Nontender to palpation, normoactive bowel sounds, no rebound/guarding. NO CVA tenderness   MUSCULOSKELETAL:  no clubbing or cyanosis of digits; no joint swelling or tenderness to palpation  PSYCH: A+O to person, place, and time; affect appropriate  NEUROLOGY: no focal deficits  SKIN: No rashes; no palpable lesions    LABS:                        13.0   7.20  )-----------( 351      ( 25 May 2021 09:47 )             39.2     05-25    138  |  102  |  12  ----------------------------<  172<H>  4.0   |  23  |  0.83    Ca    9.8      25 May 2021 09:47  Phos  3.6     05-25  Mg     1.8     05-25                Culture - Blood (collected 23 May 2021 22:52)  Source: .Blood Blood-Venous  Preliminary Report (24 May 2021 23:03):    No growth to date.    Culture - Blood (collected 23 May 2021 22:52)  Source: .Blood Blood-Peripheral  Preliminary Report (24 May 2021 23:03):    No growth to date.        RADIOLOGY & ADDITIONAL TESTS:  Results Reviewed:   Imaging Personally Reviewed:  Electrocardiogram Personally Reviewed:    COORDINATION OF CARE:  Care Discussed with Consultants/Other Providers [Y/N]:  Prior or Outpatient Records Reviewed [Y/N]:

## 2021-05-25 NOTE — PROGRESS NOTE ADULT - PROBLEM SELECTOR PLAN 4
Per pharmacy patient was once on amlodipine; also on enalapril, but patient states she had angioedema from that and was stopped  - BP noted to be elevated, increase Amlodipine to 10mg daily

## 2021-05-25 NOTE — CHART NOTE - NSCHARTNOTEFT_GEN_A_CORE
65F with PMH DM2 (A1c 7.1%), HTN, depression presented with AMS. EMS found patient to have FS 34, improved to 70 after oral glucose.     BG, insulin administration and chart reviewed  Patient BG remains stable on Admelog low dose correctional scale only. Recommend to continue while inpatient. No standing pre-meal or basal insulin  Continue to check BG before meals and bedtime  Consistent carbohydrate diet  Discharge Plan: Current GFR 85. Ok to discharge on Metformin 500 mg PO BID (ensure GFR remains above 60)  Patient states that her insurance will reinstate next month. Seen by transitions of care pharmacist  [facility] ([facility] [place])  [address] [facility] [[email or web address]] [phone].  Appointment scheduled for 9/7/21 @ 0940     CAPILLARY BLOOD GLUCOSE    POCT Blood Glucose.: 122 mg/dL (25 May 2021 12:10)  POCT Blood Glucose.: 129 mg/dL (25 May 2021 07:48)  POCT Blood Glucose.: 103 mg/dL (24 May 2021 21:35)  POCT Blood Glucose.: 93 mg/dL (24 May 2021 17:08)    05-25    138  |  102  |  12  ----------------------------<  172<H>  4.0   |  23  |  0.83    Ca    9.8      25 May 2021 09:47  Phos  3.6     05-25  Mg     1.8     05-25    MEDICATIONS  (STANDING):  amLODIPine   Tablet 10 milliGRAM(s) Oral daily  aspirin enteric coated 81 milliGRAM(s) Oral daily  atorvastatin 20 milliGRAM(s) Oral at bedtime  clotrimazole 1% Vaginal Cream 1 Applicatorful Vaginal at bedtime  dextrose 40% Gel 15 Gram(s) Oral once  dextrose 5%. 1000 milliLiter(s) (50 mL/Hr) IV Continuous <Continuous>  dextrose 50% Injectable 25 Gram(s) IV Push once  dextrose 50% Injectable 12.5 Gram(s) IV Push once  dextrose 50% Injectable 25 Gram(s) IV Push once  ertapenem  IVPB 1000 milliGRAM(s) IV Intermittent every 24 hours  glucagon  Injectable 1 milliGRAM(s) IntraMuscular once  insulin lispro (ADMELOG) corrective regimen sliding scale   SubCutaneous three times a day before meals  insulin lispro (ADMELOG) corrective regimen sliding scale   SubCutaneous at bedtime  melatonin 6 milliGRAM(s) Oral at bedtime    Diet, Regular:   Consistent Carbohydrate {No Snacks} (CSTCHO)  DASH/TLC {Sodium & Cholesterol Restricted} (DASH) (05-20-21 @ 12:00)    A1C with Estimated Average Glucose Result: 7.1 % (05-20-21 @ 11:42)  A1C with Estimated Average Glucose Result: 7.0 % (05-20-21 @ 05:09)      ENDOCRINE SIGNING OFF. CONTACT WITH QUESTIONS/CONCERNS      Pauline Gonzalez  Nurse Practitioner  Division of Endocrinology & Diabetes  In house pager #[phone]/long range pager #[phone]    If before 9AM or after 6PM, or on weekends/holidays, please call endocrine answering service for assistance ([phone]).  For nonurgent matters email [email or web address] for assistance. 65F with PMH DM2 (A1c 7.1%), HTN, depression presented with AMS. EMS found patient to have FS 34, improved to 70 after oral glucose.     BG, insulin administration and chart reviewed  Patient BG remains stable on Admelog low dose correctional scale only. Recommend to continue while inpatient. No standing pre-meal or basal insulin  Continue to check BG before meals and bedtime  Consistent carbohydrate diet  Discharge Plan: Current GFR 85. Ok to discharge on Metformin 500 mg PO BID (ensure GFR remains above 60)  STOP Sulfonylurea (Gliclazide/Glimepiride)   Patient states that her insurance will reinstate next month. Seen by transitions of care pharmacist  [facility] ([facility] [place])  [address] [facility] [phone].  Appointment scheduled for 9/7/21 @ 0940     CAPILLARY BLOOD GLUCOSE    POCT Blood Glucose.: 122 mg/dL (25 May 2021 12:10)  POCT Blood Glucose.: 129 mg/dL (25 May 2021 07:48)  POCT Blood Glucose.: 103 mg/dL (24 May 2021 21:35)  POCT Blood Glucose.: 93 mg/dL (24 May 2021 17:08)    05-25    138  |  102  |  12  ----------------------------<  172<H>  4.0   |  23  |  0.83    Ca    9.8      25 May 2021 09:47  Phos  3.6     05-25  Mg     1.8     05-25    MEDICATIONS  (STANDING):  amLODIPine   Tablet 10 milliGRAM(s) Oral daily  aspirin enteric coated 81 milliGRAM(s) Oral daily  atorvastatin 20 milliGRAM(s) Oral at bedtime  clotrimazole 1% Vaginal Cream 1 Applicatorful Vaginal at bedtime  dextrose 40% Gel 15 Gram(s) Oral once  dextrose 5%. 1000 milliLiter(s) (50 mL/Hr) IV Continuous <Continuous>  dextrose 50% Injectable 25 Gram(s) IV Push once  dextrose 50% Injectable 12.5 Gram(s) IV Push once  dextrose 50% Injectable 25 Gram(s) IV Push once  ertapenem  IVPB 1000 milliGRAM(s) IV Intermittent every 24 hours  glucagon  Injectable 1 milliGRAM(s) IntraMuscular once  insulin lispro (ADMELOG) corrective regimen sliding scale   SubCutaneous three times a day before meals  insulin lispro (ADMELOG) corrective regimen sliding scale   SubCutaneous at bedtime  melatonin 6 milliGRAM(s) Oral at bedtime    Diet, Regular:   Consistent Carbohydrate {No Snacks} (CSTCHO)  DASH/TLC {Sodium & Cholesterol Restricted} (DASH) (05-20-21 @ 12:00)    A1C with Estimated Average Glucose Result: 7.1 % (05-20-21 @ 11:42)  A1C with Estimated Average Glucose Result: 7.0 % (05-20-21 @ 05:09)      ENDOCRINE SIGNING OFF. CONTACT WITH QUESTIONS/CONCERNS      Pauline Gonzalez  Nurse Practitioner  Division of Endocrinology & Diabetes  In house pager #[phone]/long range pager #[phone]    If before 9AM or after 6PM, or on weekends/holidays, please call endocrine answering service for assistance ([phone]).  For nonurgent matters email [email or web address] for assistance.

## 2021-05-25 NOTE — PROGRESS NOTE ADULT - PROBLEM SELECTOR PLAN 3
dysarthria according to ED, currently speech wnl and no focal deficits, possible related to sepsis  - c/w asa and statin   - c/w tele, EKG NSR  - CTH neg, MRI brain negative for infarct  - CTA H/N: no stenosis   - TTE was performed and showed St 1 DD, EF 70%  - passed dysphagia screen, c/w diet

## 2021-05-25 NOTE — PROGRESS NOTE ADULT - PROBLEM SELECTOR PLAN 5
PT: no needs   reports insurance will be active 6/1  Dispo: home once antibiotic course completed     Discussed with ACP Meli

## 2021-05-25 NOTE — PROGRESS NOTE ADULT - PROBLEM SELECTOR PLAN 1
Fever 102.9 with WBC elevation 11, HR >90 with AMS and hypoglycemia meeting sepsis criteria. Had CVA tenderness (now resolved) and dysuria.  Now found to have ESBL bacteremia likely 2/2 pyelonephritis   - Bcx and Ucx with ESBL Ecoli, on ertapenem day 4/7  - Discussed with ID, patient can be treated for 7 days of Ertapenem.   - Repeat Bcx NGTD  - Patient endorsing vaginal itching - start on clotrimazole cream

## 2021-05-26 LAB
ANION GAP SERPL CALC-SCNC: 14 MMOL/L — SIGNIFICANT CHANGE UP (ref 7–14)
BUN SERPL-MCNC: 11 MG/DL — SIGNIFICANT CHANGE UP (ref 7–23)
CALCIUM SERPL-MCNC: 9.8 MG/DL — SIGNIFICANT CHANGE UP (ref 8.4–10.5)
CHLORIDE SERPL-SCNC: 102 MMOL/L — SIGNIFICANT CHANGE UP (ref 98–107)
CO2 SERPL-SCNC: 21 MMOL/L — LOW (ref 22–31)
CREAT SERPL-MCNC: 0.88 MG/DL — SIGNIFICANT CHANGE UP (ref 0.5–1.3)
CULTURE RESULTS: SIGNIFICANT CHANGE UP
GLUCOSE BLDC GLUCOMTR-MCNC: 102 MG/DL — HIGH (ref 70–99)
GLUCOSE BLDC GLUCOMTR-MCNC: 114 MG/DL — HIGH (ref 70–99)
GLUCOSE BLDC GLUCOMTR-MCNC: 114 MG/DL — HIGH (ref 70–99)
GLUCOSE BLDC GLUCOMTR-MCNC: 118 MG/DL — HIGH (ref 70–99)
GLUCOSE BLDC GLUCOMTR-MCNC: 98 MG/DL — SIGNIFICANT CHANGE UP (ref 70–99)
GLUCOSE SERPL-MCNC: 116 MG/DL — HIGH (ref 70–99)
HCT VFR BLD CALC: 37.5 % — SIGNIFICANT CHANGE UP (ref 34.5–45)
HGB BLD-MCNC: 12.6 G/DL — SIGNIFICANT CHANGE UP (ref 11.5–15.5)
MAGNESIUM SERPL-MCNC: 1.9 MG/DL — SIGNIFICANT CHANGE UP (ref 1.6–2.6)
MCHC RBC-ENTMCNC: 27.8 PG — SIGNIFICANT CHANGE UP (ref 27–34)
MCHC RBC-ENTMCNC: 33.6 GM/DL — SIGNIFICANT CHANGE UP (ref 32–36)
MCV RBC AUTO: 82.6 FL — SIGNIFICANT CHANGE UP (ref 80–100)
NRBC # BLD: 0 /100 WBCS — SIGNIFICANT CHANGE UP
NRBC # FLD: 0 K/UL — SIGNIFICANT CHANGE UP
PHOSPHATE SERPL-MCNC: 3.6 MG/DL — SIGNIFICANT CHANGE UP (ref 2.5–4.5)
PLATELET # BLD AUTO: 390 K/UL — SIGNIFICANT CHANGE UP (ref 150–400)
POTASSIUM SERPL-MCNC: 4.2 MMOL/L — SIGNIFICANT CHANGE UP (ref 3.5–5.3)
POTASSIUM SERPL-SCNC: 4.2 MMOL/L — SIGNIFICANT CHANGE UP (ref 3.5–5.3)
RBC # BLD: 4.54 M/UL — SIGNIFICANT CHANGE UP (ref 3.8–5.2)
RBC # FLD: 14.1 % — SIGNIFICANT CHANGE UP (ref 10.3–14.5)
SODIUM SERPL-SCNC: 137 MMOL/L — SIGNIFICANT CHANGE UP (ref 135–145)
SPECIMEN SOURCE: SIGNIFICANT CHANGE UP
WBC # BLD: 7.65 K/UL — SIGNIFICANT CHANGE UP (ref 3.8–10.5)
WBC # FLD AUTO: 7.65 K/UL — SIGNIFICANT CHANGE UP (ref 3.8–10.5)

## 2021-05-26 PROCEDURE — 99232 SBSQ HOSP IP/OBS MODERATE 35: CPT

## 2021-05-26 RX ORDER — ERTAPENEM SODIUM 1 G/1
1000 INJECTION, POWDER, LYOPHILIZED, FOR SOLUTION INTRAMUSCULAR; INTRAVENOUS EVERY 24 HOURS
Refills: 0 | Status: DISCONTINUED | OUTPATIENT
Start: 2021-05-26 | End: 2021-05-27

## 2021-05-26 RX ADMIN — Medication 81 MILLIGRAM(S): at 12:20

## 2021-05-26 RX ADMIN — ZOLPIDEM TARTRATE 5 MILLIGRAM(S): 10 TABLET ORAL at 21:23

## 2021-05-26 RX ADMIN — ERTAPENEM SODIUM 120 MILLIGRAM(S): 1 INJECTION, POWDER, LYOPHILIZED, FOR SOLUTION INTRAMUSCULAR; INTRAVENOUS at 15:45

## 2021-05-26 RX ADMIN — Medication 650 MILLIGRAM(S): at 14:15

## 2021-05-26 RX ADMIN — Medication 6 MILLIGRAM(S): at 21:23

## 2021-05-26 RX ADMIN — AMLODIPINE BESYLATE 10 MILLIGRAM(S): 2.5 TABLET ORAL at 06:30

## 2021-05-26 RX ADMIN — ATORVASTATIN CALCIUM 20 MILLIGRAM(S): 80 TABLET, FILM COATED ORAL at 21:22

## 2021-05-26 RX ADMIN — Medication 650 MILLIGRAM(S): at 13:45

## 2021-05-26 RX ADMIN — Medication 1 APPLICATORFUL: at 21:22

## 2021-05-26 NOTE — PROGRESS NOTE ADULT - PROBLEM SELECTOR PLAN 4
Per pharmacy patient was once on amlodipine; also on enalapril, but patient states she had angioedema from that and was stopped  - BP noted to be elevated, increase Amlodipine to 10mg daily Per pharmacy patient was once on amlodipine; also on enalapril, but patient states she had angioedema from that and was stopped  - BP noted to be elevated, c/w Amlodipine 10mg daily  - Can consider adding HCTZ 12.5mg if remains elevated

## 2021-05-26 NOTE — PROGRESS NOTE ADULT - PROBLEM SELECTOR PLAN 1
Fever 102.9 with WBC elevation 11, HR >90 with AMS and hypoglycemia meeting sepsis criteria. Had CVA tenderness (now resolved) and dysuria.  Now found to have ESBL bacteremia likely 2/2 pyelonephritis   - Bcx and Ucx with ESBL Ecoli, on ertapenem day 5/7  - Discussed with ID, patient can be treated for 7 days of Ertapenem.   - Repeat Bcx NGTD  - Patient endorsing vaginal itching - start on clotrimazole cream

## 2021-05-26 NOTE — PROGRESS NOTE ADULT - SUBJECTIVE AND OBJECTIVE BOX
[facility] Division of Hospital Medicine  Chayito Nix MD  Pager: [phone]      Patient is a 65y old  Female who presents with a chief complaint of hypoglycemia, AMS, slurred speech (25 May 2021 16:22)      SUBJECTIVE / OVERNIGHT EVENTS: patient feels well, discussed that BP is still high but want to give meds a few days to work, she reports she was on HCTZ in the past but stopped taking it. Vaginal itching resolving. No other complaints.     MEDICATIONS  (STANDING):  amLODIPine   Tablet 10 milliGRAM(s) Oral daily  aspirin enteric coated 81 milliGRAM(s) Oral daily  atorvastatin 20 milliGRAM(s) Oral at bedtime  clotrimazole 1% Vaginal Cream 1 Applicatorful Vaginal at bedtime  dextrose 40% Gel 15 Gram(s) Oral once  dextrose 5%. 1000 milliLiter(s) (50 mL/Hr) IV Continuous <Continuous>  dextrose 50% Injectable 25 Gram(s) IV Push once  dextrose 50% Injectable 12.5 Gram(s) IV Push once  dextrose 50% Injectable 25 Gram(s) IV Push once  ertapenem  IVPB 1000 milliGRAM(s) IV Intermittent every 24 hours  glucagon  Injectable 1 milliGRAM(s) IntraMuscular once  insulin lispro (ADMELOG) corrective regimen sliding scale   SubCutaneous three times a day before meals  insulin lispro (ADMELOG) corrective regimen sliding scale   SubCutaneous at bedtime  melatonin 6 milliGRAM(s) Oral at bedtime    MEDICATIONS  (PRN):  acetaminophen   Tablet .. 650 milliGRAM(s) Oral every 6 hours PRN Temp greater or equal to 38C (100.4F), Mild Pain (1 - 3), Moderate Pain (4 - 6), Severe Pain (7 - 10)  zolpidem 5 milliGRAM(s) Oral at bedtime PRN Insomnia      CAPILLARY BLOOD GLUCOSE      POCT Blood Glucose.: 102 mg/dL (26 May 2021 11:52)  POCT Blood Glucose.: 118 mg/dL (26 May 2021 08:03)  POCT Blood Glucose.: 114 mg/dL (26 May 2021 06:34)  POCT Blood Glucose.: 104 mg/dL (25 May 2021 21:16)  POCT Blood Glucose.: 100 mg/dL (25 May 2021 17:05)    I&O's Summary      PHYSICAL EXAM:  Vital Signs Last 24 Hrs  T(C): 36.4 (26 May 2021 06:27), Max: 37.1 (25 May 2021 16:48)  T(F): 97.6 (26 May 2021 06:27), Max: 98.8 (25 May 2021 16:48)  HR: 86 (26 May 2021 08:29) (82 - 86)  BP: 146/79 (26 May 2021 08:29) (146/79 - 160/85)  BP(mean): --  RR: 17 (26 May 2021 08:29) (17 - 18)  SpO2: 100% (26 May 2021 08:29) (98% - 100%)    CONSTITUTIONAL: NAD, well-developed, well-groomed  ENMT: Moist oral mucosa  RESPIRATORY: Normal respiratory effort; lungs are clear to auscultation bilaterally  CARDIOVASCULAR:  S1/S2; No lower extremity edema  ABDOMEN: Nontender to palpation, normoactive bowel sounds, no rebound/guarding. NO CVA tenderness   MUSCULOSKELETAL:  no clubbing or cyanosis of digits; no joint swelling or tenderness to palpation  PSYCH: A+O to person, place, and time; affect appropriate  NEUROLOGY: no focal deficits  SKIN: No rashes; no palpable lesions    LABS:                        12.6   7.65  )-----------( 390      ( 26 May 2021 07:02 )             37.5     05-26    137  |  102  |  11  ----------------------------<  116<H>  4.2   |  21<L>  |  0.88    Ca    9.8      26 May 2021 07:02  Phos  3.6     05-26  Mg     1.9     05-26                Culture - Blood (collected 23 May 2021 22:52)  Source: .Blood Blood-Venous  Preliminary Report (24 May 2021 23:03):    No growth to date.    Culture - Blood (collected 23 May 2021 22:52)  Source: .Blood Blood-Peripheral  Preliminary Report (24 May 2021 23:03):    No growth to date.        RADIOLOGY & ADDITIONAL TESTS:  Results Reviewed:   Imaging Personally Reviewed:  Electrocardiogram Personally Reviewed:    COORDINATION OF CARE:  Care Discussed with Consultants/Other Providers [Y/N]:  Prior or Outpatient Records Reviewed [Y/N]:

## 2021-05-26 NOTE — PROGRESS NOTE ADULT - PROBLEM SELECTOR PLAN 5
PT: no needs   reports insurance will be active 6/1  Dispo: home once antibiotic course completed     Discussed with ACP PT: no needs   reports insurance will be active 6/1  Dispo: home on Friday once antibiotic course completed     Discussed with ACP Alcides

## 2021-05-27 ENCOUNTER — TRANSCRIPTION ENCOUNTER (OUTPATIENT)
Age: 66
End: 2021-05-27

## 2021-05-27 LAB
ALBUMIN SERPL ELPH-MCNC: 3.7 G/DL — SIGNIFICANT CHANGE UP (ref 3.3–5)
ALP SERPL-CCNC: 105 U/L — SIGNIFICANT CHANGE UP (ref 40–120)
ALT FLD-CCNC: 55 U/L — HIGH (ref 4–33)
ANION GAP SERPL CALC-SCNC: 13 MMOL/L — SIGNIFICANT CHANGE UP (ref 7–14)
AST SERPL-CCNC: 31 U/L — SIGNIFICANT CHANGE UP (ref 4–32)
BILIRUB DIRECT SERPL-MCNC: <0.2 MG/DL — SIGNIFICANT CHANGE UP (ref 0–0.2)
BILIRUB INDIRECT FLD-MCNC: >0.1 MG/DL — SIGNIFICANT CHANGE UP (ref 0–1)
BILIRUB SERPL-MCNC: 0.3 MG/DL — SIGNIFICANT CHANGE UP (ref 0.2–1.2)
BUN SERPL-MCNC: 14 MG/DL — SIGNIFICANT CHANGE UP (ref 7–23)
CALCIUM SERPL-MCNC: 9.7 MG/DL — SIGNIFICANT CHANGE UP (ref 8.4–10.5)
CHLORIDE SERPL-SCNC: 102 MMOL/L — SIGNIFICANT CHANGE UP (ref 98–107)
CO2 SERPL-SCNC: 24 MMOL/L — SIGNIFICANT CHANGE UP (ref 22–31)
CREAT SERPL-MCNC: 0.9 MG/DL — SIGNIFICANT CHANGE UP (ref 0.5–1.3)
GLUCOSE BLDC GLUCOMTR-MCNC: 106 MG/DL — HIGH (ref 70–99)
GLUCOSE BLDC GLUCOMTR-MCNC: 115 MG/DL — HIGH (ref 70–99)
GLUCOSE BLDC GLUCOMTR-MCNC: 123 MG/DL — HIGH (ref 70–99)
GLUCOSE BLDC GLUCOMTR-MCNC: 123 MG/DL — HIGH (ref 70–99)
GLUCOSE BLDC GLUCOMTR-MCNC: 137 MG/DL — HIGH (ref 70–99)
GLUCOSE SERPL-MCNC: 125 MG/DL — HIGH (ref 70–99)
HCT VFR BLD CALC: 35.8 % — SIGNIFICANT CHANGE UP (ref 34.5–45)
HGB BLD-MCNC: 12 G/DL — SIGNIFICANT CHANGE UP (ref 11.5–15.5)
MAGNESIUM SERPL-MCNC: 1.9 MG/DL — SIGNIFICANT CHANGE UP (ref 1.6–2.6)
MCHC RBC-ENTMCNC: 27.4 PG — SIGNIFICANT CHANGE UP (ref 27–34)
MCHC RBC-ENTMCNC: 33.5 GM/DL — SIGNIFICANT CHANGE UP (ref 32–36)
MCV RBC AUTO: 81.7 FL — SIGNIFICANT CHANGE UP (ref 80–100)
NRBC # BLD: 0 /100 WBCS — SIGNIFICANT CHANGE UP
NRBC # FLD: 0 K/UL — SIGNIFICANT CHANGE UP
PHOSPHATE SERPL-MCNC: 3.7 MG/DL — SIGNIFICANT CHANGE UP (ref 2.5–4.5)
PLATELET # BLD AUTO: 389 K/UL — SIGNIFICANT CHANGE UP (ref 150–400)
POTASSIUM SERPL-MCNC: 4 MMOL/L — SIGNIFICANT CHANGE UP (ref 3.5–5.3)
POTASSIUM SERPL-SCNC: 4 MMOL/L — SIGNIFICANT CHANGE UP (ref 3.5–5.3)
PROT SERPL-MCNC: 7.8 G/DL — SIGNIFICANT CHANGE UP (ref 6–8.3)
RBC # BLD: 4.38 M/UL — SIGNIFICANT CHANGE UP (ref 3.8–5.2)
RBC # FLD: 14 % — SIGNIFICANT CHANGE UP (ref 10.3–14.5)
SODIUM SERPL-SCNC: 139 MMOL/L — SIGNIFICANT CHANGE UP (ref 135–145)
WBC # BLD: 8.47 K/UL — SIGNIFICANT CHANGE UP (ref 3.8–10.5)
WBC # FLD AUTO: 8.47 K/UL — SIGNIFICANT CHANGE UP (ref 3.8–10.5)

## 2021-05-27 PROCEDURE — 99232 SBSQ HOSP IP/OBS MODERATE 35: CPT

## 2021-05-27 RX ORDER — HYDROCHLOROTHIAZIDE 25 MG
12.5 TABLET ORAL DAILY
Refills: 0 | Status: DISCONTINUED | OUTPATIENT
Start: 2021-05-27 | End: 2021-05-28

## 2021-05-27 RX ORDER — ERTAPENEM SODIUM 1 G/1
1000 INJECTION, POWDER, LYOPHILIZED, FOR SOLUTION INTRAMUSCULAR; INTRAVENOUS EVERY 24 HOURS
Refills: 0 | Status: DISCONTINUED | OUTPATIENT
Start: 2021-05-27 | End: 2021-05-28

## 2021-05-27 RX ADMIN — Medication 81 MILLIGRAM(S): at 11:47

## 2021-05-27 RX ADMIN — Medication 1 APPLICATORFUL: at 21:33

## 2021-05-27 RX ADMIN — Medication 6 MILLIGRAM(S): at 21:31

## 2021-05-27 RX ADMIN — ATORVASTATIN CALCIUM 20 MILLIGRAM(S): 80 TABLET, FILM COATED ORAL at 21:31

## 2021-05-27 RX ADMIN — Medication 12.5 MILLIGRAM(S): at 11:47

## 2021-05-27 RX ADMIN — ERTAPENEM SODIUM 120 MILLIGRAM(S): 1 INJECTION, POWDER, LYOPHILIZED, FOR SOLUTION INTRAMUSCULAR; INTRAVENOUS at 14:09

## 2021-05-27 RX ADMIN — AMLODIPINE BESYLATE 10 MILLIGRAM(S): 2.5 TABLET ORAL at 04:48

## 2021-05-27 NOTE — PROGRESS NOTE ADULT - PROBLEM SELECTOR PLAN 4
Per pharmacy patient was once on amlodipine; also on enalapril, but patient states she had angioedema from that and was stopped  - BP noted to be elevated, c/w Amlodipine 10mg daily  - Start HCTZ 12.5mg as BP remains elevated

## 2021-05-27 NOTE — PROGRESS NOTE ADULT - PROBLEM SELECTOR PLAN 1
Fever 102.9 with WBC elevation 11, HR >90 with AMS and hypoglycemia meeting sepsis criteria. Had CVA tenderness (now resolved) and dysuria.  Now found to have ESBL bacteremia likely 2/2 pyelonephritis   - Bcx and Ucx with ESBL Ecoli, on ertapenem day 6/7  - Discussed with ID, patient can be treated for 7 days of Ertapenem.   - Repeat Bcx NGTD  - Patient endorsing vaginal itching - start on clotrimazole cream

## 2021-05-27 NOTE — DISCHARGE NOTE PROVIDER - CARE PROVIDER_API CALL
Primary care, Physician  Phone: (   )    -  Fax: (   )    -  Established Patient  Follow Up Time: 1 week

## 2021-05-27 NOTE — DISCHARGE NOTE PROVIDER - NSDCCPCAREPLAN_GEN_ALL_CORE_FT
PRINCIPAL DISCHARGE DIAGNOSIS  Diagnosis: Gait abnormality  Assessment and Plan of Treatment:       SECONDARY DISCHARGE DIAGNOSES  Diagnosis: Hypoglycemia  Assessment and Plan of Treatment:      PRINCIPAL DISCHARGE DIAGNOSIS  Diagnosis: Pyelonephritis  Assessment and Plan of Treatment: You were found to have pyleonephritis or a kidney infection which spread to your blood stream. You were found to have ESBL E.coli, which is a resistant bacteria and you required IV antibiotics for treatment. Please follow-up with a primary care physician in 1-2 weeks. Please return to the ER if you experience back pain or fevers.      SECONDARY DISCHARGE DIAGNOSES  Diagnosis: Type 2 diabetes mellitus with hypoglycemia without coma, without long-term current use of insulin  Assessment and Plan of Treatment: You came in with low blood sugars. Please STOP taking your glimerpide and only take Metformin as prescribed. Please follow-up with your primary care doctor in 1-2 weeks.    Diagnosis: Essential hypertension  Assessment and Plan of Treatment: You were found to have elevated blood pressure. You were started on blood pressure medication, please continue to take it as prescribed and follow-up with your PCP in 1-2 weeks for a blood pressure check.     PRINCIPAL DISCHARGE DIAGNOSIS  Diagnosis: Pyelonephritis  Assessment and Plan of Treatment: You were found to have pyleonephritis or a kidney infection which spread to your blood stream. You were found to have ESBL E.coli, which is a resistant bacteria and you required IV antibiotics for treatment. Please follow-up with a primary care physician in 1-2 weeks. Please return to the ER if you experience back pain or fevers.      SECONDARY DISCHARGE DIAGNOSES  Diagnosis: Type 2 diabetes mellitus with hypoglycemia without coma, without long-term current use of insulin  Assessment and Plan of Treatment: You came in with low blood sugars. Please STOP taking your glimerpide and only take Metformin 500 2 x day  as prescribed. Please follow-up with your primary care doctor in 1-2 weeks.    Diagnosis: Essential hypertension  Assessment and Plan of Treatment: You were found to have elevated blood pressure. You were started on blood pressure medication HCTZ 12.5 mg and Amlodipine increase to 10 mg daily, please continue to take it as prescribed and follow-up with your PCP in 1-2 weeks for a blood pressure check.

## 2021-05-27 NOTE — DISCHARGE NOTE PROVIDER - NSDCMRMEDTOKEN_GEN_ALL_CORE_FT
amLODIPine 5 mg oral tablet: 1 tab(s) orally once a day  Aspirin Enteric Coated 81 mg oral delayed release tablet: 1 tab(s) orally once a day  atorvastatin 20 mg oral tablet: 1 tab(s) orally once a day  glimepiride:   metFORMIN 500 mg oral tablet: 1 tab(s) orally 2 times a day   amLODIPine 5 mg oral tablet: 1 tab(s) orally once a day  Aspirin Enteric Coated 81 mg oral delayed release tablet: 1 tab(s) orally once a day  atorvastatin 20 mg oral tablet: 1 tab(s) orally once a day  glimepiride:   hydroCHLOROthiazide 12.5 mg oral capsule: 1 cap(s) orally once a day  metFORMIN 500 mg oral tablet: 1 tab(s) orally 2 times a day   amLODIPine 5 mg oral tablet: 1 tab(s) orally once a day  Aspirin Enteric Coated 81 mg oral delayed release tablet: 1 tab(s) orally once a day  atorvastatin 20 mg oral tablet: 1 tab(s) orally once a day  hydroCHLOROthiazide 12.5 mg oral capsule: 1 cap(s) orally once a day  metFORMIN 500 mg oral tablet: 1 tab(s) orally 2 times a day   amLODIPine 10 mg oral tablet: 1 tab(s) orally once a day  Aspirin Enteric Coated 81 mg oral delayed release tablet: 1 tab(s) orally once a day  atorvastatin 20 mg oral tablet: 1 tab(s) orally once a day  hydroCHLOROthiazide 12.5 mg oral capsule: 1 cap(s) orally once a day  metFORMIN 500 mg oral tablet: 1 tab(s) orally 2 times a day

## 2021-05-27 NOTE — CHART NOTE - NSCHARTNOTEFT_GEN_A_CORE
NUTRITION FOLLOW-UP: Spoke with Pt concerning questions she had about a consistent carbohydrate diet. Reviewed diet and answered all questions. Pt received a full education and written informations from previous visit by RDN. Pt states she is eating well. No complaints of GI distress.    Weight: 97.2 kg    Labs: POCT- 123, 137, 115, 114    Current Diet: Consistent Carb, DASH    Enteral Recommendations:    RD to Remain Available: Roselyn Bingham MS, RDN, CDN pager [phone]     Additional Recommendations:   1) Monitor weight, labs, po intake and skin integrity.

## 2021-05-27 NOTE — PROGRESS NOTE ADULT - PROBLEM SELECTOR PLAN 5
PT: no needs   reports insurance will be active 6/1, needs new PMD   Dispo: home on Friday once antibiotic course completed     Discussed with ACP Alcides

## 2021-05-27 NOTE — DISCHARGE NOTE PROVIDER - HOSPITAL COURSE
65F DM2, HTN, KASIA, depression presented with AMS and slurring of speech in setting of hypoglycemic event with prolonged dysarthria after correction of hypoglycemia, concern for acute neurologic event also noted to have ESBL Ecoli bacteremia likely due to pyelonephritis     Problem: Pyelonephritis.  Plan: Fever 102.9 with WBC elevation 11, HR >90 with AMS and hypoglycemia meeting sepsis criteria. Had CVA tenderness (now resolved) and dysuria.  Now found to have ESBL bacteremia likely 2/2 pyelonephritis   - Bcx and Ucx with ESBL Ecoli, on ertapenem day 6/7  - Discussed with ID, patient can be treated for 7 days of Ertapenem.   - Repeat Bcx NGTD  - Patient endorsing vaginal itching - start on clotrimazole cream.     Problem: Type 2 diabetes mellitus with hypoglycemia without coma, without long-term current use of insulin.  Plan: Hypoglycemia likely due to sulfonylurea use + decreased oral intake + bacteremia; HbA1c is 7.1%  - monitor blood glucose on ISS  - appreciate endo recs, plan for discharge on metformin 500 mg BID  - STOP Sulfonylurea on discharge   - No further episodes of hypoglycemia.     Problem: R/O Cerebrovascular accident (CVA), unspecified mechanism.  Plan: dysarthria according to ED, currently speech wnl and no focal deficits, possible related to sepsis  - c/w asa and statin   - c/w tele, EKG NSR  - CTH neg, MRI brain negative for infarct  - CTA H/N: no stenosis   - TTE was performed and showed St 1 DD, EF 70%  - passed dysphagia screen, c/w diet.     Problem: Essential hypertension.  Plan: Per pharmacy patient was once on amlodipine; also on enalapril, but patient states she had angioedema from that and was stopped  - BP noted to be elevated, c/w Amlodipine 10mg daily  - Start HCTZ 12.5mg as BP remains elevated.     Problem: Prophylactic measure.  Plan: PT: no needs   reports insurance will be active 6/1, needs new PMD   Dispo: home on 5/28  once antibiotic course completed     On ___ this case was reviewed with  ____, the patient is medically stable and optimized for discharge. All medications were reviewed and prescriptions were sent to mutually agreed upon pharmacy.         65F DM2, HTN, KASIA, depression presented with AMS and slurring of speech in setting of hypoglycemic event with prolonged dysarthria after correction of hypoglycemia, concern for acute neurologic event also noted to have ESBL Ecoli bacteremia likely due to pyelonephritis     Problem: Pyelonephritis.  Plan: Fever 102.9 with WBC elevation 11, HR >90 with AMS and hypoglycemia meeting sepsis criteria. Had CVA tenderness (now resolved) and dysuria.  Now found to have ESBL bacteremia likely 2/2 pyelonephritis   - Bcx and Ucx with ESBL Ecoli, on ertapenem day 6/7  - Discussed with ID, patient can be treated for 7 days of Ertapenem.   - Repeat Bcx NGTD  - Patient endorsing vaginal itching - start on clotrimazole cream.     Problem: Type 2 diabetes mellitus with hypoglycemia without coma, without long-term current use of insulin.  Plan: Hypoglycemia likely due to sulfonylurea use + decreased oral intake + bacteremia; HbA1c is 7.1%  - monitor blood glucose on ISS  - appreciate endo recs, plan for discharge on metformin 500 mg BID  - STOP Sulfonylurea on discharge   - No further episodes of hypoglycemia.     Problem: R/O Cerebrovascular accident (CVA), unspecified mechanism.  Plan: dysarthria according to ED, currently speech wnl and no focal deficits, possible related to sepsis  - c/w asa and statin   - c/w tele, EKG NSR  - CTH neg, MRI brain negative for infarct  - CTA H/N: no stenosis   - TTE was performed and showed St 1 DD, EF 70%  - passed dysphagia screen, c/w diet.     Problem: Essential hypertension.  Plan: Per pharmacy patient was once on amlodipine; also on enalapril, but patient states she had angioedema from that and was stopped  - BP noted to be elevated, c/w Amlodipine 10mg daily  - Started HCTZ 12.5mg as BP remains elevated.     Problem: Prophylactic measure.  Plan: PT: no needs   reports insurance will be active 6/1, needs new PMD   Dispo: home on 5/28  once antibiotic course completed

## 2021-05-27 NOTE — DISCHARGE NOTE PROVIDER - PROVIDER TOKENS
FREE:[LAST:[Primary care],FIRST:[Physician],PHONE:[(   )    -],FAX:[(   )    -],FOLLOWUP:[1 week],ESTABLISHEDPATIENT:[T]]

## 2021-05-27 NOTE — PROGRESS NOTE ADULT - SUBJECTIVE AND OBJECTIVE BOX
[facility] Division of Hospital Medicine  Chayito Nix MD  Pager: [phone]      Patient is a 65y old  Female who presents with a chief complaint of hypoglycemia, AMS, slurred speech (26 May 2021 13:34)      SUBJECTIVE / OVERNIGHT EVENTS: patient feels well, no pain on urination, no vaginal discharge or itching anymore. Discussed elevated BP despite amlodipine, patient agreeable to adding 2nd agent. Discussed need for BP check with PMD in 1-2 weeks, she understands.      MEDICATIONS  (STANDING):  amLODIPine   Tablet 10 milliGRAM(s) Oral daily  aspirin enteric coated 81 milliGRAM(s) Oral daily  atorvastatin 20 milliGRAM(s) Oral at bedtime  clotrimazole 1% Vaginal Cream 1 Applicatorful Vaginal at bedtime  dextrose 40% Gel 15 Gram(s) Oral once  dextrose 5%. 1000 milliLiter(s) (50 mL/Hr) IV Continuous <Continuous>  dextrose 50% Injectable 25 Gram(s) IV Push once  dextrose 50% Injectable 12.5 Gram(s) IV Push once  dextrose 50% Injectable 25 Gram(s) IV Push once  ertapenem  IVPB 1000 milliGRAM(s) IV Intermittent every 24 hours  glucagon  Injectable 1 milliGRAM(s) IntraMuscular once  hydrochlorothiazide 12.5 milliGRAM(s) Oral daily  insulin lispro (ADMELOG) corrective regimen sliding scale   SubCutaneous three times a day before meals  insulin lispro (ADMELOG) corrective regimen sliding scale   SubCutaneous at bedtime  melatonin 6 milliGRAM(s) Oral at bedtime    MEDICATIONS  (PRN):  acetaminophen   Tablet .. 650 milliGRAM(s) Oral every 6 hours PRN Temp greater or equal to 38C (100.4F), Mild Pain (1 - 3), Moderate Pain (4 - 6), Severe Pain (7 - 10)  zolpidem 5 milliGRAM(s) Oral at bedtime PRN Insomnia      CAPILLARY BLOOD GLUCOSE      POCT Blood Glucose.: 123 mg/dL (27 May 2021 11:57)  POCT Blood Glucose.: 137 mg/dL (27 May 2021 07:51)  POCT Blood Glucose.: 115 mg/dL (27 May 2021 04:35)  POCT Blood Glucose.: 114 mg/dL (26 May 2021 21:35)  POCT Blood Glucose.: 98 mg/dL (26 May 2021 16:50)    I&O's Summary      PHYSICAL EXAM:  Vital Signs Last 24 Hrs  T(C): 36.9 (27 May 2021 11:45), Max: 36.9 (27 May 2021 08:19)  T(F): 98.4 (27 May 2021 11:45), Max: 98.5 (27 May 2021 08:19)  HR: 79 (27 May 2021 11:45) (79 - 92)  BP: 140/76 (27 May 2021 11:45) (140/76 - 162/82)  BP(mean): --  RR: 17 (27 May 2021 11:45) (17 - 18)  SpO2: 100% (27 May 2021 11:45) (100% - 100%)    CONSTITUTIONAL: NAD, well-developed, well-groomed  ENMT: Moist oral mucosa  RESPIRATORY: Normal respiratory effort; lungs are clear to auscultation bilaterally  CARDIOVASCULAR:  S1/S2; No lower extremity edema  ABDOMEN: Nontender to palpation, normoactive bowel sounds, no rebound/guarding. NO CVA tenderness   MUSCULOSKELETAL:  no clubbing or cyanosis of digits; no joint swelling or tenderness to palpation  PSYCH: A+O to person, place, and time; affect appropriate  NEUROLOGY: no focal deficits  SKIN: No rashes; no palpable lesions    LABS:                        12.0   8.47  )-----------( 389      ( 27 May 2021 06:46 )             35.8     05-27    139  |  102  |  14  ----------------------------<  125<H>  4.0   |  24  |  0.90    Ca    9.7      27 May 2021 06:46  Phos  3.7     05-27  Mg     1.9     05-27    TPro  7.8  /  Alb  3.7  /  TBili  0.3  /  DBili  <0.2  /  AST  31  /  ALT  55<H>  /  AlkPhos  105  05-27                RADIOLOGY & ADDITIONAL TESTS:  Results Reviewed:   Imaging Personally Reviewed:  Electrocardiogram Personally Reviewed:    COORDINATION OF CARE:  Care Discussed with Consultants/Other Providers [Y/N]:  Prior or Outpatient Records Reviewed [Y/N]:

## 2021-05-27 NOTE — DISCHARGE NOTE PROVIDER - NSFOLLOWUPCLINICS_GEN_ALL_ED_FT
[facility] [place]  Internal Medicine  [address] [place] [address]  Phone: [phone]  Fax: [phone]

## 2021-05-27 NOTE — DISCHARGE NOTE PROVIDER - NSDCFUADDAPPT_GEN_ALL_CORE_FT
Please make an appointment with [facility] for a routine follow up with a primary care physician within 7-10 days

## 2021-05-28 ENCOUNTER — TRANSCRIPTION ENCOUNTER (OUTPATIENT)
Age: 66
End: 2021-05-28

## 2021-05-28 VITALS
DIASTOLIC BLOOD PRESSURE: 74 MMHG | SYSTOLIC BLOOD PRESSURE: 145 MMHG | OXYGEN SATURATION: 100 % | HEART RATE: 75 BPM | RESPIRATION RATE: 18 BRPM | TEMPERATURE: 98 F

## 2021-05-28 LAB
ANION GAP SERPL CALC-SCNC: 12 MMOL/L — SIGNIFICANT CHANGE UP (ref 7–14)
BUN SERPL-MCNC: 15 MG/DL — SIGNIFICANT CHANGE UP (ref 7–23)
CALCIUM SERPL-MCNC: 9.9 MG/DL — SIGNIFICANT CHANGE UP (ref 8.4–10.5)
CHLORIDE SERPL-SCNC: 103 MMOL/L — SIGNIFICANT CHANGE UP (ref 98–107)
CO2 SERPL-SCNC: 25 MMOL/L — SIGNIFICANT CHANGE UP (ref 22–31)
CREAT SERPL-MCNC: 1.01 MG/DL — SIGNIFICANT CHANGE UP (ref 0.5–1.3)
CULTURE RESULTS: SIGNIFICANT CHANGE UP
CULTURE RESULTS: SIGNIFICANT CHANGE UP
GLUCOSE BLDC GLUCOMTR-MCNC: 111 MG/DL — HIGH (ref 70–99)
GLUCOSE BLDC GLUCOMTR-MCNC: 129 MG/DL — HIGH (ref 70–99)
GLUCOSE SERPL-MCNC: 106 MG/DL — HIGH (ref 70–99)
MAGNESIUM SERPL-MCNC: 2 MG/DL — SIGNIFICANT CHANGE UP (ref 1.6–2.6)
PHOSPHATE SERPL-MCNC: 3.7 MG/DL — SIGNIFICANT CHANGE UP (ref 2.5–4.5)
POTASSIUM SERPL-MCNC: 4 MMOL/L — SIGNIFICANT CHANGE UP (ref 3.5–5.3)
POTASSIUM SERPL-SCNC: 4 MMOL/L — SIGNIFICANT CHANGE UP (ref 3.5–5.3)
SODIUM SERPL-SCNC: 140 MMOL/L — SIGNIFICANT CHANGE UP (ref 135–145)
SPECIMEN SOURCE: SIGNIFICANT CHANGE UP
SPECIMEN SOURCE: SIGNIFICANT CHANGE UP

## 2021-05-28 PROCEDURE — 99239 HOSP IP/OBS DSCHRG MGMT >30: CPT

## 2021-05-28 RX ORDER — ASPIRIN/CALCIUM CARB/MAGNESIUM 324 MG
1 TABLET ORAL
Qty: 0 | Refills: 0 | DISCHARGE

## 2021-05-28 RX ORDER — ASPIRIN/CALCIUM CARB/MAGNESIUM 324 MG
1 TABLET ORAL
Qty: 30 | Refills: 0
Start: 2021-05-28 | End: 2021-06-26

## 2021-05-28 RX ORDER — METFORMIN HYDROCHLORIDE 850 MG/1
1 TABLET ORAL
Qty: 0 | Refills: 0 | DISCHARGE

## 2021-05-28 RX ORDER — METFORMIN HYDROCHLORIDE 850 MG/1
1 TABLET ORAL
Qty: 60 | Refills: 0
Start: 2021-05-28 | End: 2021-06-26

## 2021-05-28 RX ORDER — AMLODIPINE BESYLATE 2.5 MG/1
1 TABLET ORAL
Qty: 30 | Refills: 0
Start: 2021-05-28 | End: 2021-06-26

## 2021-05-28 RX ORDER — ATORVASTATIN CALCIUM 80 MG/1
1 TABLET, FILM COATED ORAL
Qty: 0 | Refills: 0 | DISCHARGE

## 2021-05-28 RX ORDER — GLIMEPIRIDE 1 MG
0 TABLET ORAL
Qty: 0 | Refills: 0 | DISCHARGE

## 2021-05-28 RX ORDER — ATORVASTATIN CALCIUM 80 MG/1
1 TABLET, FILM COATED ORAL
Qty: 20 | Refills: 0
Start: 2021-05-28 | End: 2021-06-16

## 2021-05-28 RX ORDER — ERTAPENEM SODIUM 1 G/1
1000 INJECTION, POWDER, LYOPHILIZED, FOR SOLUTION INTRAMUSCULAR; INTRAVENOUS EVERY 24 HOURS
Refills: 0 | Status: COMPLETED | OUTPATIENT
Start: 2021-05-28 | End: 2021-05-28

## 2021-05-28 RX ORDER — AMLODIPINE BESYLATE 2.5 MG/1
1 TABLET ORAL
Qty: 0 | Refills: 0 | DISCHARGE

## 2021-05-28 RX ADMIN — ERTAPENEM SODIUM 120 MILLIGRAM(S): 1 INJECTION, POWDER, LYOPHILIZED, FOR SOLUTION INTRAMUSCULAR; INTRAVENOUS at 13:03

## 2021-05-28 RX ADMIN — ZOLPIDEM TARTRATE 5 MILLIGRAM(S): 10 TABLET ORAL at 00:12

## 2021-05-28 RX ADMIN — AMLODIPINE BESYLATE 10 MILLIGRAM(S): 2.5 TABLET ORAL at 05:30

## 2021-05-28 RX ADMIN — Medication 81 MILLIGRAM(S): at 13:03

## 2021-05-28 RX ADMIN — Medication 12.5 MILLIGRAM(S): at 05:30

## 2021-05-28 NOTE — PROGRESS NOTE ADULT - PROBLEM SELECTOR PROBLEM 2
Type 2 diabetes mellitus with hypoglycemia without coma, without long-term current use of insulin
Essential hypertension
Type 2 diabetes mellitus with hypoglycemia without coma, without long-term current use of insulin

## 2021-05-28 NOTE — PROGRESS NOTE ADULT - ASSESSMENT
65F DM2, HTN, KASIA, depression presented with AMS and slurring of speech in setting of hypoglycemic event with prolonged dysarthria after correction of hypoglycemia, concern for acute neurologic event also noted to have ESBL Ecoli bacteremia likely due to pyelonephritis 
65F DM2, HTN, KASIA, depression presented with AMS and slurring of speech in setting of hypoglycemic event with prolonged dysarthria after correction of hypoglycemia, concern for acute neurologic event also noted to have GNR bacteremia likely due to pyelonephritis 
65F with PMH DM2, HTN, depression presented with AMS. EMS found patient to have FS 34, improved to 70 after oral glucose. 
65F DM2, HTN, KASIA, depression presented with AMS and slurring of speech in setting of hypoglycemic event with prolonged dysarthria after correction of hypoglycemia, concern for acute neurologic event also noted to have GNR bacteremia likely due to pyelonephritis 
65F DM2, HTN, KASIA, depression presented with AMS and slurring of speech in setting of hypoglycemic event with prolonged dysarthria after correction of hypoglycemia, concern for acute neurologic event also noted to have ESBL Ecoli bacteremia likely due to pyelonephritis 

## 2021-05-28 NOTE — PROGRESS NOTE ADULT - PROBLEM SELECTOR PLAN 4
Per pharmacy patient was once on amlodipine; also on enalapril, but patient states she had angioedema from that and was stopped  - BP noted to be elevated, c/w Amlodipine 10mg daily  - Start HCTZ 12.5mg as BP remains elevated Per pharmacy patient was once on amlodipine; also on enalapril, but patient states she had angioedema from that and was stopped  - BP noted to be elevated, Started on Amlodipine 10mg daily and HCTZ 12.5mg --> BP better controlled

## 2021-05-28 NOTE — PROGRESS NOTE ADULT - SUBJECTIVE AND OBJECTIVE BOX
[facility] Division of Hospital Medicine  Chayito Nix MD  Pager: [phone]      Patient is a 65y old  Female who presents with a chief complaint of hypoglycemia, AMS, slurred speech (27 May 2021 13:32)      SUBJECTIVE / OVERNIGHT EVENTS: patient feels well and has no complaints. Back pain improving. Ready to go home.     MEDICATIONS  (STANDING):  amLODIPine   Tablet 10 milliGRAM(s) Oral daily  aspirin enteric coated 81 milliGRAM(s) Oral daily  atorvastatin 20 milliGRAM(s) Oral at bedtime  clotrimazole 1% Vaginal Cream 1 Applicatorful Vaginal at bedtime  dextrose 40% Gel 15 Gram(s) Oral once  dextrose 5%. 1000 milliLiter(s) (50 mL/Hr) IV Continuous <Continuous>  dextrose 50% Injectable 25 Gram(s) IV Push once  dextrose 50% Injectable 12.5 Gram(s) IV Push once  dextrose 50% Injectable 25 Gram(s) IV Push once  ertapenem  IVPB 1000 milliGRAM(s) IV Intermittent every 24 hours  glucagon  Injectable 1 milliGRAM(s) IntraMuscular once  hydrochlorothiazide 12.5 milliGRAM(s) Oral daily  insulin lispro (ADMELOG) corrective regimen sliding scale   SubCutaneous three times a day before meals  insulin lispro (ADMELOG) corrective regimen sliding scale   SubCutaneous at bedtime  melatonin 6 milliGRAM(s) Oral at bedtime    MEDICATIONS  (PRN):  acetaminophen   Tablet .. 650 milliGRAM(s) Oral every 6 hours PRN Temp greater or equal to 38C (100.4F), Mild Pain (1 - 3), Moderate Pain (4 - 6), Severe Pain (7 - 10)  zolpidem 5 milliGRAM(s) Oral at bedtime PRN Insomnia      CAPILLARY BLOOD GLUCOSE      POCT Blood Glucose.: 129 mg/dL (28 May 2021 07:49)  POCT Blood Glucose.: 123 mg/dL (27 May 2021 21:17)  POCT Blood Glucose.: 106 mg/dL (27 May 2021 16:30)  POCT Blood Glucose.: 123 mg/dL (27 May 2021 11:57)    I&O's Summary      PHYSICAL EXAM:  Vital Signs Last 24 Hrs  T(C): 37.1 (28 May 2021 05:31), Max: 37.1 (28 May 2021 05:31)  T(F): 98.7 (28 May 2021 05:31), Max: 98.7 (28 May 2021 05:31)  HR: 75 (28 May 2021 05:31) (75 - 84)  BP: 131/75 (28 May 2021 05:31) (130/71 - 141/67)  BP(mean): --  RR: 18 (28 May 2021 05:31) (16 - 18)  SpO2: 100% (28 May 2021 05:31) (100% - 100%)    CONSTITUTIONAL: NAD, well-developed, well-groomed  ENMT: Moist oral mucosa  RESPIRATORY: Normal respiratory effort; lungs are clear to auscultation bilaterally  CARDIOVASCULAR:  S1/S2; No lower extremity edema  ABDOMEN: Nontender to palpation, normoactive bowel sounds, no rebound/guarding. NO CVA tenderness   MUSCULOSKELETAL:  no clubbing or cyanosis of digits; no joint swelling or tenderness to palpation  PSYCH: A+O to person, place, and time; affect appropriate  NEUROLOGY: no focal deficits  SKIN: No rashes; no palpable lesions    LABS:                        12.0   8.47  )-----------( 389      ( 27 May 2021 06:46 )             35.8     05-28    140  |  103  |  15  ----------------------------<  106<H>  4.0   |  25  |  1.01    Ca    9.9      28 May 2021 06:30  Phos  3.7     05-28  Mg     2.0     05-28    TPro  7.8  /  Alb  3.7  /  TBili  0.3  /  DBili  <0.2  /  AST  31  /  ALT  55<H>  /  AlkPhos  105  05-27                RADIOLOGY & ADDITIONAL TESTS:  Results Reviewed:   Imaging Personally Reviewed:  Electrocardiogram Personally Reviewed:    COORDINATION OF CARE:  Care Discussed with Consultants/Other Providers [Y/N]:  Prior or Outpatient Records Reviewed [Y/N]:

## 2021-05-28 NOTE — DISCHARGE NOTE NURSING/CASE MANAGEMENT/SOCIAL WORK - NSDCFUADDAPPT_GEN_ALL_CORE_FT
Please make an appointment with [facility] for a routine follow up with a primary care physician within 7-10 days

## 2021-05-28 NOTE — PROGRESS NOTE ADULT - PROVIDER SPECIALTY LIST ADULT
Hospitalist
Endocrinology
Hospitalist

## 2021-05-28 NOTE — PROGRESS NOTE ADULT - PROBLEM SELECTOR PROBLEM 1
Pyelonephritis
Type 2 diabetes mellitus with hypoglycemia without coma, without long-term current use of insulin
Pyelonephritis

## 2021-05-28 NOTE — PROGRESS NOTE ADULT - REASON FOR ADMISSION
hypoglycemia, AMS, slurred speech
hypoglycemia, AMS, GNR bacteremia
hypoglycemia, AMS, slurred speech

## 2021-05-28 NOTE — DISCHARGE NOTE NURSING/CASE MANAGEMENT/SOCIAL WORK - PATIENT PORTAL LINK FT
You can access the FollowMyHealth Patient Portal offered by [facility] by registering at the following website: [email or web address]. By joining [employer]’s FollowMyHealth portal, you will also be able to view your health information using other applications (apps) compatible with our system.

## 2021-05-28 NOTE — PROGRESS NOTE ADULT - PROBLEM SELECTOR PLAN 2
Hypoglycemia likely due to sulfonylurea use + decreased oral intake + bacteremia; HbA1c is 7.1%  - monitor blood glucose on ISS  - appreciate endo recs, plan for discharge on metformin 500 mg BID  - STOP Sulfonylurea on discharge   - No further episodes of hypoglycemia
Hypoglycemia likley due to sulfonylurea use + decreased oral intake + bacteremia; HbA1c is 7.1%  - monitor blood glucose  - appreciate endo recs, plan for dc on metformin 500 mg BID
Hypoglycemia likely due to sulfonylurea use + decreased oral intake + bacteremia; HbA1c is 7.1%  - monitor blood glucose on ISS  - appreciate endo recs, plan for discharge on metformin 500 mg BID  - STOP Sulfonylurea on discharge   - No further episodes of hypoglycemia
Hypoglycemia likley due to sulfonylurea use + decreased oral intake + bacteremia; HbA1c is 7.1%  - monitor blood glucose  - appreciate endo recs, plan for dc on metformin 500 mg BID
Hypoglycemia likely due to sulfonylurea use + decreased oral intake + bacteremia; HbA1c is 7.1%  - monitor blood glucose on ISS  - appreciate endo recs, plan for discharge on metformin 500 mg BID  - STOP Sulfonylurea on discharge   - No further episodes of hypoglycemia
Hypoglycemia likley due to sulfonylurea use + decreased oral intake + bacteremia; HbA1c is 7.1%  - monitor blood glucose  - appreciate endo recs, plan for dc on metformin 500 mg BID  No further episodes of hypoglycemia
Target glucose 100-180 mg/dL   Defer medication management to primary team
Hypoglycemia likely due to sulfonylurea use + decreased oral intake + bacteremia; HbA1c is 7.1%  - monitor blood glucose on ISS  - appreciate endo recs, plan for discharge on metformin 500 mg BID  - STOP Sulfonylurea on discharge   - No further episodes of hypoglycemia
Hypoglycemia likley due to sulfonylurea use + decreased oral intake + bacteremia; HbA1c is 7.1%  - monitor blood glucose  - appreciate endo recs, plan for dc on metformin 500 mg BID  No further episodes of hypoglycemia

## 2021-05-28 NOTE — PROGRESS NOTE ADULT - PROBLEM SELECTOR PLAN 1
Fever 102.9 with WBC elevation 11, HR >90 with AMS and hypoglycemia meeting sepsis criteria. Had CVA tenderness (now resolved) and dysuria.  Now found to have ESBL bacteremia likely 2/2 pyelonephritis   - Bcx and Ucx with ESBL Ecoli, on ertapenem day 7/7  - Discussed with ID, patient can be treated for 7 days of Ertapenem.   - Repeat Bcx NGTD  - Patient endorsing vaginal itching - improved on clotrimazole cream

## 2021-05-28 NOTE — PROGRESS NOTE ADULT - PROBLEM SELECTOR PROBLEM 3
R/O Cerebrovascular accident (CVA), unspecified mechanism
Other hyperlipidemia
R/O Cerebrovascular accident (CVA), unspecified mechanism

## 2021-05-28 NOTE — PROGRESS NOTE ADULT - PROBLEM SELECTOR PLAN 5
PT: no needs   reports insurance will be active 6/1, needs new PMD   Dispo: patient stable for d/c home today. She will complete her 7 days course of IV Ertapenem for ESBL bacteremia 2/2 pyelo. Encouraged to follow-up with [facility] as has no PMD. [facility]. All questions answered, patient agreeable w/ dc home today.   31 minutes spent on discharge planning     Discussed with ACP Lindsay

## 2021-06-02 ENCOUNTER — INPATIENT (INPATIENT)
Facility: HOSPITAL | Age: 66
LOS: 1 days | Discharge: HOME | End: 2021-06-04
Attending: INTERNAL MEDICINE | Admitting: INTERNAL MEDICINE
Payer: MEDICARE

## 2021-06-02 VITALS
OXYGEN SATURATION: 100 % | SYSTOLIC BLOOD PRESSURE: 161 MMHG | HEART RATE: 100 BPM | RESPIRATION RATE: 18 BRPM | DIASTOLIC BLOOD PRESSURE: 78 MMHG | HEIGHT: 67 IN | TEMPERATURE: 98 F

## 2021-06-02 DIAGNOSIS — Z29.9 ENCOUNTER FOR PROPHYLACTIC MEASURES, UNSPECIFIED: ICD-10-CM

## 2021-06-02 DIAGNOSIS — N12 TUBULO-INTERSTITIAL NEPHRITIS, NOT SPECIFIED AS ACUTE OR CHRONIC: ICD-10-CM

## 2021-06-02 DIAGNOSIS — E78.00 PURE HYPERCHOLESTEROLEMIA, UNSPECIFIED: ICD-10-CM

## 2021-06-02 DIAGNOSIS — I10 ESSENTIAL (PRIMARY) HYPERTENSION: ICD-10-CM

## 2021-06-02 DIAGNOSIS — E11.9 TYPE 2 DIABETES MELLITUS WITHOUT COMPLICATIONS: ICD-10-CM

## 2021-06-02 LAB
ALBUMIN SERPL ELPH-MCNC: 4.2 G/DL — SIGNIFICANT CHANGE UP (ref 3.3–5)
ALP SERPL-CCNC: 125 U/L — HIGH (ref 40–120)
ALT FLD-CCNC: 43 U/L — HIGH (ref 4–33)
ANION GAP SERPL CALC-SCNC: 16 MMOL/L — HIGH (ref 7–14)
APPEARANCE UR: CLEAR — SIGNIFICANT CHANGE UP
AST SERPL-CCNC: 28 U/L — SIGNIFICANT CHANGE UP (ref 4–32)
BASOPHILS # BLD AUTO: 0.06 K/UL — SIGNIFICANT CHANGE UP (ref 0–0.2)
BASOPHILS NFR BLD AUTO: 0.6 % — SIGNIFICANT CHANGE UP (ref 0–2)
BILIRUB SERPL-MCNC: 0.5 MG/DL — SIGNIFICANT CHANGE UP (ref 0.2–1.2)
BILIRUB UR-MCNC: NEGATIVE — SIGNIFICANT CHANGE UP
BUN SERPL-MCNC: 17 MG/DL — SIGNIFICANT CHANGE UP (ref 7–23)
CALCIUM SERPL-MCNC: 10 MG/DL — SIGNIFICANT CHANGE UP (ref 8.4–10.5)
CHLORIDE SERPL-SCNC: 98 MMOL/L — SIGNIFICANT CHANGE UP (ref 98–107)
CO2 SERPL-SCNC: 22 MMOL/L — SIGNIFICANT CHANGE UP (ref 22–31)
COLOR SPEC: SIGNIFICANT CHANGE UP
CREAT SERPL-MCNC: 0.96 MG/DL — SIGNIFICANT CHANGE UP (ref 0.5–1.3)
DIFF PNL FLD: NEGATIVE — SIGNIFICANT CHANGE UP
EOSINOPHIL # BLD AUTO: 0.08 K/UL — SIGNIFICANT CHANGE UP (ref 0–0.5)
EOSINOPHIL NFR BLD AUTO: 0.8 % — SIGNIFICANT CHANGE UP (ref 0–6)
GLUCOSE SERPL-MCNC: 141 MG/DL — HIGH (ref 70–99)
GLUCOSE UR QL: NEGATIVE — SIGNIFICANT CHANGE UP
HCT VFR BLD CALC: 39.4 % — SIGNIFICANT CHANGE UP (ref 34.5–45)
HGB BLD-MCNC: 12.8 G/DL — SIGNIFICANT CHANGE UP (ref 11.5–15.5)
IANC: 5.98 K/UL — SIGNIFICANT CHANGE UP (ref 1.5–8.5)
IMM GRANULOCYTES NFR BLD AUTO: 0.2 % — SIGNIFICANT CHANGE UP (ref 0–1.5)
KETONES UR-MCNC: NEGATIVE — SIGNIFICANT CHANGE UP
LEUKOCYTE ESTERASE UR-ACNC: ABNORMAL
LYMPHOCYTES # BLD AUTO: 3.65 K/UL — HIGH (ref 1–3.3)
LYMPHOCYTES # BLD AUTO: 35.7 % — SIGNIFICANT CHANGE UP (ref 13–44)
MCHC RBC-ENTMCNC: 27.1 PG — SIGNIFICANT CHANGE UP (ref 27–34)
MCHC RBC-ENTMCNC: 32.5 GM/DL — SIGNIFICANT CHANGE UP (ref 32–36)
MCV RBC AUTO: 83.5 FL — SIGNIFICANT CHANGE UP (ref 80–100)
MONOCYTES # BLD AUTO: 0.42 K/UL — SIGNIFICANT CHANGE UP (ref 0–0.9)
MONOCYTES NFR BLD AUTO: 4.1 % — SIGNIFICANT CHANGE UP (ref 2–14)
NEUTROPHILS # BLD AUTO: 5.98 K/UL — SIGNIFICANT CHANGE UP (ref 1.8–7.4)
NEUTROPHILS NFR BLD AUTO: 58.6 % — SIGNIFICANT CHANGE UP (ref 43–77)
NITRITE UR-MCNC: NEGATIVE — SIGNIFICANT CHANGE UP
NRBC # BLD: 0 /100 WBCS — SIGNIFICANT CHANGE UP
NRBC # FLD: 0 K/UL — SIGNIFICANT CHANGE UP
PH UR: 6 — SIGNIFICANT CHANGE UP (ref 5–8)
PLATELET # BLD AUTO: 453 K/UL — HIGH (ref 150–400)
POTASSIUM SERPL-MCNC: 4.1 MMOL/L — SIGNIFICANT CHANGE UP (ref 3.5–5.3)
POTASSIUM SERPL-SCNC: 4.1 MMOL/L — SIGNIFICANT CHANGE UP (ref 3.5–5.3)
PROT SERPL-MCNC: 8.5 G/DL — HIGH (ref 6–8.3)
PROT UR-MCNC: NEGATIVE — SIGNIFICANT CHANGE UP
RBC # BLD: 4.72 M/UL — SIGNIFICANT CHANGE UP (ref 3.8–5.2)
RBC # FLD: 13.9 % — SIGNIFICANT CHANGE UP (ref 10.3–14.5)
SARS-COV-2 RNA SPEC QL NAA+PROBE: SIGNIFICANT CHANGE UP
SODIUM SERPL-SCNC: 136 MMOL/L — SIGNIFICANT CHANGE UP (ref 135–145)
SP GR SPEC: 1.01 — SIGNIFICANT CHANGE UP (ref 1.01–1.02)
UROBILINOGEN FLD QL: SIGNIFICANT CHANGE UP
WBC # BLD: 10.21 K/UL — SIGNIFICANT CHANGE UP (ref 3.8–10.5)
WBC # FLD AUTO: 10.21 K/UL — SIGNIFICANT CHANGE UP (ref 3.8–10.5)

## 2021-06-02 PROCEDURE — 99223 1ST HOSP IP/OBS HIGH 75: CPT

## 2021-06-02 PROCEDURE — 99222 1ST HOSP IP/OBS MODERATE 55: CPT

## 2021-06-02 PROCEDURE — 99285 EMERGENCY DEPT VISIT HI MDM: CPT

## 2021-06-02 PROCEDURE — 74177 CT ABD & PELVIS W/CONTRAST: CPT | Mod: 26

## 2021-06-02 RX ORDER — ERTAPENEM SODIUM 1 G/1
1000 INJECTION, POWDER, LYOPHILIZED, FOR SOLUTION INTRAMUSCULAR; INTRAVENOUS ONCE
Refills: 0 | Status: COMPLETED | OUTPATIENT
Start: 2021-06-02 | End: 2021-06-02

## 2021-06-02 RX ORDER — ERTAPENEM SODIUM 1 G/1
1000 INJECTION, POWDER, LYOPHILIZED, FOR SOLUTION INTRAMUSCULAR; INTRAVENOUS EVERY 24 HOURS
Refills: 0 | Status: DISCONTINUED | OUTPATIENT
Start: 2021-06-03 | End: 2021-06-03

## 2021-06-02 RX ORDER — KETOROLAC TROMETHAMINE 30 MG/ML
15 SYRINGE (ML) INJECTION ONCE
Refills: 0 | Status: DISCONTINUED | OUTPATIENT
Start: 2021-06-02 | End: 2021-06-02

## 2021-06-02 RX ORDER — DEXTROSE 50 % IN WATER 50 %
25 SYRINGE (ML) INTRAVENOUS ONCE
Refills: 0 | Status: DISCONTINUED | OUTPATIENT
Start: 2021-06-02 | End: 2021-06-04

## 2021-06-02 RX ORDER — DEXTROSE 50 % IN WATER 50 %
15 SYRINGE (ML) INTRAVENOUS ONCE
Refills: 0 | Status: DISCONTINUED | OUTPATIENT
Start: 2021-06-02 | End: 2021-06-04

## 2021-06-02 RX ORDER — LANOLIN ALCOHOL/MO/W.PET/CERES
6 CREAM (GRAM) TOPICAL ONCE
Refills: 0 | Status: COMPLETED | OUTPATIENT
Start: 2021-06-02 | End: 2021-06-03

## 2021-06-02 RX ORDER — LANOLIN ALCOHOL/MO/W.PET/CERES
3 CREAM (GRAM) TOPICAL AT BEDTIME
Refills: 0 | Status: DISCONTINUED | OUTPATIENT
Start: 2021-06-02 | End: 2021-06-04

## 2021-06-02 RX ORDER — AMLODIPINE BESYLATE 2.5 MG/1
10 TABLET ORAL DAILY
Refills: 0 | Status: DISCONTINUED | OUTPATIENT
Start: 2021-06-02 | End: 2021-06-04

## 2021-06-02 RX ORDER — HYDROCHLOROTHIAZIDE 25 MG
12.5 TABLET ORAL DAILY
Refills: 0 | Status: DISCONTINUED | OUTPATIENT
Start: 2021-06-02 | End: 2021-06-04

## 2021-06-02 RX ORDER — ENOXAPARIN SODIUM 100 MG/ML
40 INJECTION SUBCUTANEOUS DAILY
Refills: 0 | Status: DISCONTINUED | OUTPATIENT
Start: 2021-06-02 | End: 2021-06-04

## 2021-06-02 RX ORDER — SODIUM CHLORIDE 9 MG/ML
1000 INJECTION, SOLUTION INTRAVENOUS
Refills: 0 | Status: DISCONTINUED | OUTPATIENT
Start: 2021-06-02 | End: 2021-06-04

## 2021-06-02 RX ORDER — KETOROLAC TROMETHAMINE 30 MG/ML
15 SYRINGE (ML) INJECTION EVERY 6 HOURS
Refills: 0 | Status: DISCONTINUED | OUTPATIENT
Start: 2021-06-02 | End: 2021-06-03

## 2021-06-02 RX ORDER — ASPIRIN/CALCIUM CARB/MAGNESIUM 324 MG
81 TABLET ORAL DAILY
Refills: 0 | Status: DISCONTINUED | OUTPATIENT
Start: 2021-06-02 | End: 2021-06-04

## 2021-06-02 RX ORDER — AMLODIPINE BESYLATE 2.5 MG/1
10 TABLET ORAL DAILY
Refills: 0 | Status: DISCONTINUED | OUTPATIENT
Start: 2021-06-02 | End: 2021-06-02

## 2021-06-02 RX ORDER — MEROPENEM 1 G/30ML
1000 INJECTION INTRAVENOUS ONCE
Refills: 0 | Status: DISCONTINUED | OUTPATIENT
Start: 2021-06-02 | End: 2021-06-02

## 2021-06-02 RX ORDER — ACETAMINOPHEN 500 MG
650 TABLET ORAL EVERY 6 HOURS
Refills: 0 | Status: DISCONTINUED | OUTPATIENT
Start: 2021-06-02 | End: 2021-06-04

## 2021-06-02 RX ORDER — INSULIN LISPRO 100/ML
VIAL (ML) SUBCUTANEOUS
Refills: 0 | Status: DISCONTINUED | OUTPATIENT
Start: 2021-06-02 | End: 2021-06-04

## 2021-06-02 RX ORDER — DEXTROSE 50 % IN WATER 50 %
12.5 SYRINGE (ML) INTRAVENOUS ONCE
Refills: 0 | Status: DISCONTINUED | OUTPATIENT
Start: 2021-06-02 | End: 2021-06-04

## 2021-06-02 RX ORDER — ATORVASTATIN CALCIUM 80 MG/1
20 TABLET, FILM COATED ORAL AT BEDTIME
Refills: 0 | Status: DISCONTINUED | OUTPATIENT
Start: 2021-06-02 | End: 2021-06-04

## 2021-06-02 RX ORDER — INSULIN LISPRO 100/ML
VIAL (ML) SUBCUTANEOUS AT BEDTIME
Refills: 0 | Status: DISCONTINUED | OUTPATIENT
Start: 2021-06-02 | End: 2021-06-04

## 2021-06-02 RX ORDER — ONDANSETRON 8 MG/1
4 TABLET, FILM COATED ORAL ONCE
Refills: 0 | Status: COMPLETED | OUTPATIENT
Start: 2021-06-02 | End: 2021-06-02

## 2021-06-02 RX ORDER — SODIUM CHLORIDE 9 MG/ML
1000 INJECTION INTRAMUSCULAR; INTRAVENOUS; SUBCUTANEOUS ONCE
Refills: 0 | Status: COMPLETED | OUTPATIENT
Start: 2021-06-02 | End: 2021-06-02

## 2021-06-02 RX ORDER — GLUCAGON INJECTION, SOLUTION 0.5 MG/.1ML
1 INJECTION, SOLUTION SUBCUTANEOUS ONCE
Refills: 0 | Status: DISCONTINUED | OUTPATIENT
Start: 2021-06-02 | End: 2021-06-04

## 2021-06-02 RX ADMIN — Medication 12.5 MILLIGRAM(S): at 15:54

## 2021-06-02 RX ADMIN — Medication 3 MILLIGRAM(S): at 22:14

## 2021-06-02 RX ADMIN — Medication 15 MILLIGRAM(S): at 02:59

## 2021-06-02 RX ADMIN — SODIUM CHLORIDE 1000 MILLILITER(S): 9 INJECTION INTRAMUSCULAR; INTRAVENOUS; SUBCUTANEOUS at 05:03

## 2021-06-02 RX ADMIN — ENOXAPARIN SODIUM 40 MILLIGRAM(S): 100 INJECTION SUBCUTANEOUS at 15:53

## 2021-06-02 RX ADMIN — Medication 15 MILLIGRAM(S): at 17:58

## 2021-06-02 RX ADMIN — Medication 15 MILLIGRAM(S): at 03:15

## 2021-06-02 RX ADMIN — Medication 15 MILLIGRAM(S): at 06:19

## 2021-06-02 RX ADMIN — Medication 15 MILLIGRAM(S): at 06:46

## 2021-06-02 RX ADMIN — SODIUM CHLORIDE 1000 MILLILITER(S): 9 INJECTION INTRAMUSCULAR; INTRAVENOUS; SUBCUTANEOUS at 02:59

## 2021-06-02 RX ADMIN — ATORVASTATIN CALCIUM 20 MILLIGRAM(S): 80 TABLET, FILM COATED ORAL at 21:25

## 2021-06-02 RX ADMIN — ERTAPENEM SODIUM 120 MILLIGRAM(S): 1 INJECTION, POWDER, LYOPHILIZED, FOR SOLUTION INTRAMUSCULAR; INTRAVENOUS at 07:10

## 2021-06-02 RX ADMIN — ONDANSETRON 4 MILLIGRAM(S): 8 TABLET, FILM COATED ORAL at 05:03

## 2021-06-02 RX ADMIN — AMLODIPINE BESYLATE 10 MILLIGRAM(S): 2.5 TABLET ORAL at 10:17

## 2021-06-02 RX ADMIN — SODIUM CHLORIDE 100 MILLILITER(S): 9 INJECTION, SOLUTION INTRAVENOUS at 14:22

## 2021-06-02 RX ADMIN — Medication 81 MILLIGRAM(S): at 15:54

## 2021-06-02 NOTE — ED PROVIDER NOTE - CLINICAL SUMMARY MEDICAL DECISION MAKING FREE TEXT BOX
66 y/o female pmh htn, dm, rebecca p/w dysuria, flank pain, + left cav  tenderness, jarad get labs, ua, ggive meds, reasses

## 2021-06-02 NOTE — H&P ADULT - NSHPPHYSICALEXAM_GEN_ALL_CORE
Vital Signs Last 24 Hrs  T(C): 36.6 (02 Jun 2021 12:22), Max: 36.8 (02 Jun 2021 10:09)  T(F): 97.9 (02 Jun 2021 12:22), Max: 98.2 (02 Jun 2021 10:09)  HR: 80 (02 Jun 2021 12:22) (80 - 100)  BP: 158/93 (02 Jun 2021 12:22) (152/76 - 168/102)  BP(mean): --  RR: 17 (02 Jun 2021 12:22) (17 - 18)  SpO2: 99% (02 Jun 2021 12:22) (98% - 100%)  CAPILLARY BLOOD GLUCOSE      POCT Blood Glucose.: 94 mg/dL (02 Jun 2021 12:19)  POCT Blood Glucose.: 129 mg/dL (02 Jun 2021 09:06)  POCT Blood Glucose.: 119 mg/dL (02 Jun 2021 02:28)    I&O's Summary      PHYSICAL EXAM:  GENERAL: NAD, well-developed  HEAD:  Atraumatic, Normocephalic  EYES: EOMI, PERRLA, conjunctiva and sclera clear  NECK: Supple, No JVD  CHEST/LUNG: Clear to auscultation bilaterally; No wheeze  HEART: Regular rate and rhythm; No murmurs, rubs, or gallops  ABDOMEN: Soft, RLQ/suprapubic tenderness, L CVAT   Nondistended; Bowel sounds present  EXTREMITIES:  2+ Peripheral Pulses, No clubbing, cyanosis, or edema  PSYCH: AAOx3  NEUROLOGY: non-focal  SKIN: No rashes or lesions

## 2021-06-02 NOTE — PATIENT PROFILE ADULT - VISION (WITH CORRECTIVE LENSES IF THE PATIENT USUALLY WEARS THEM):
with glasses for reading/Partially impaired: cannot see medication labels or newsprint, but can see obstacles in path, and the surrounding layout; can count fingers at arm's length

## 2021-06-02 NOTE — H&P ADULT - PROBLEM SELECTOR PLAN 3
-bp elevated d/t pain and distress  -c/w home norvasc and hctz  -last admission, pt had echo that showed normal LVEF 70%, stage 1 diastolic dysfunction; she was ruled out for CVA with negative CTA H/N and MRI brain

## 2021-06-02 NOTE — H&P ADULT - HISTORY OF PRESENT ILLNESS
65F with PMH DM2, HTN, ?KASIA, depression, recently admitted on 5/20 for AMS i/s/o hypoglycemia and found to have ESBL E.coli bacteremia d/t  L pyelonephritis, treated with 7 days of ertapenem and discharged on 5/28, presents with worsening left flank pain associated with chills, associated with urinary frequency and urgency, no subjective fever. Pt reports pain recurred 2 days after discharge and progressively worsened.     In ED, pt is afebrile, WBC 10.2, UA small LE, CT abd/pelvis Bilateral pyelonephritis phlegmon in the upper pole the right kidney. No drainable fluid collection. Patient was cultured, given 1L NS bolus, and given a dose of ertapenem.

## 2021-06-02 NOTE — H&P ADULT - NSHPLABSRESULTS_GEN_ALL_CORE
LABS:                        12.8   10. )-----------( 453      ( 2021 03:49 )             39.4     06-    136  |  98  |  17  ----------------------------<  141<H>  4.1   |  22  |  0.96    Ca    10.0      2021 03:49    TPro  8.5<H>  /  Alb  4.2  /  TBili  0.5  /  DBili  x   /  AST  28  /  ALT  43<H>  /  AlkPhos  125<H>        Urinalysis Basic - ( 2021 03:49 )    Color: Light Yellow / Appearance: Clear / S.014 / pH: x  Gluc: x / Ketone: Negative  / Bili: Negative / Urobili: <2 mg/dL   Blood: x / Protein: Negative / Nitrite: Negative   Leuk Esterase: Small / RBC: 0 /HPF / WBC 0-5 /HPF   Sq Epi: x / Non Sq Epi: 3 /HPF / Bacteria: Negative      EKG:    RADIOLOGY & ADDITIONAL TESTS:  < from: CT Abdomen and Pelvis w/ IV Cont (21 @ 06:07) >    KIDNEYS/URETERS: Heterogeneous enhancement of the right kidney with round hypodense lesions in the upper pole. No hydronephrosis. Focus focus of hypoattenuation in the left upper pole. Traceable fluid collections.    BLADDER: Underdistended.  REPRODUCTIVE ORGANS: Hysterectomy.    BOWEL: No bowel obstruction. Appendix isnormal.  PERITONEUM: No ascites.  VESSELS: Atherosclerotic changes.  RETROPERITONEUM/LYMPH NODES: No lymphadenopathy.  ABDOMINAL WALL: Within normal limits.  BONES: Within normal limits.    IMPRESSION:  Bilateral pyelonephritis phlegmon in the upper pole the right kidney. No drainable fluid collection.

## 2021-06-02 NOTE — ED ADULT NURSE NOTE - CHIEF COMPLAINT QUOTE
Pt c/o back pain, dysuria and urinary frequency. Pt reports Recent hospital discharge for pyelonephritis/ persistent hypoglycemia? PMHX DM, HTN. Denies fever, chills, sob.

## 2021-06-02 NOTE — H&P ADULT - PROBLEM SELECTOR PLAN 1
-last admission Bcx and Ucx grew ESBL E. coli, completed 7 day course of ertapenem on 5/28  -now recurrent pyelo w/ CT c/f b/l pyelonephritis phlegmon in the upper pole of the right kidney  -start ertapenem  -IVF, f/u cultures   -ID consult, d/w ID fellow  -likely needs repeat imaging at some point to r/o renal abscess or drainage collection, no drainable collection at this time  -consider urology consult pending clinical course, no stone on imaging -last admission Bcx and Ucx grew ESBL E. coli 5/20, completed 7 day course of ertapenem on 5/28, repeat Bcx no growth on 5/23  -now recurrent pyelo w/ CT c/f b/l pyelonephritis phlegmon in the upper pole of the right kidney  -start ertapenem  -IVF, f/u cultures (urine, blood)  -ID consult, d/w ID fellow  -likely needs repeat imaging at some point to r/o renal abscess or drainage collection, no drainable collection at this time  -consider urology consult pending clinical course, no stone on imaging

## 2021-06-02 NOTE — ED PROVIDER NOTE - ATTENDING CONTRIBUTION TO CARE
HPI: 66 y/o F with Past Medical History HTN, DM, KASIA, recently admitted ~ 1 month ago for persistent hypoglycemia, found to have ESBL UTI, given iv abx, discharged 4 days ago, now presents with left flank pain, dysuria x 2 days, denies any headaches, neck pain, cough, f/c/n/v/d, chest pain, sob, numbness/weakness/tingling, recent travel, sick contact  EXAM: Uncomfortable appearing, holding left flank and suprapbuc region. + CVAT left, abd soft nontender, no rashes, heart RRR, lungs ctab.   MDM: pt with multiple medical problems that was recently admitted and discharged and found to have ESBL with multiple drug resistances that was discharged without Abx that has been afebile but with slight chills that presents with left flank pain radiating to left groin. Possible UTI vs pyelo vs kidney stone. Will require labs and pain control and imaging if warranted by UA results.

## 2021-06-02 NOTE — ED ADULT TRIAGE NOTE - CHIEF COMPLAINT QUOTE
Pt c/o back pain, dysuria and urinary frequency. Recent discharge from [facility] for pyelonephritis. PMHX DM, HTN. Denies fever, chills, sob. Pt c/o back pain, dysuria and urinary frequency. Pt reports Recent discharge from [facility] for pyelonephritis/ persistent hypoglycemia? PMHX DM, HTN. Denies fever, chills, sob. Pt c/o back pain, dysuria and urinary frequency. Pt reports Recent hospital discharge for pyelonephritis/ persistent hypoglycemia? PMHX DM, HTN. Denies fever, chills, sob.

## 2021-06-02 NOTE — ED PROVIDER NOTE - OBJECTIVE STATEMENT
64 y/o female pmh htn, dm, rebecca, recently admitted ~ 1 month ago for hypoglycemia, found to have esbl, given iv abx, discharged 4 days ago, no wpresents with left flank pain, dysuria x 2 days, denies any headaches, neck pain, cough, f/c/n/v/d, chest pain, sob, numbness/weakness/tingling, recent travel, sick contact

## 2021-06-02 NOTE — ED ADULT NURSE NOTE - OBJECTIVE STATEMENT
patient complaining of left side back pain with burning with urination. patient has had past UTIs before. no n/v having chills at home

## 2021-06-02 NOTE — ED ADULT NURSE NOTE - NSIMPLEMENTINTERV_GEN_ALL_ED
Implemented All Universal Safety Interventions:  Offerle to call system. Call bell, personal items and telephone within reach. Instruct patient to call for assistance. Room bathroom lighting operational. Non-slip footwear when patient is off stretcher. Physically safe environment: no spills, clutter or unnecessary equipment. Stretcher in lowest position, wheels locked, appropriate side rails in place.

## 2021-06-02 NOTE — H&P ADULT - ASSESSMENT
65F with PMH DM2, HTN, ?KASIA, depression, recently admitted on 5/20 for AMS i/s/o hypoglycemia and found to have ESBL E.coli bacteremia d/t  pyelonephritis, treated with 7 days of ertapenem and discharged on 5/28, presents with recurrent pyelonephritis, CT c/f b/l pyelonephritis phlegmon in the upper pole the right kidney.

## 2021-06-02 NOTE — CONSULT NOTE ADULT - ASSESSMENT
65 year old female with DM2, HTN, Depression admitted from 5/20-5/28/21 for AMS from hypoglycemia and ESBL E. coli bacteremia from pyelonephritis completed 7 days of ertapenem, was home was having worsening bilateral flank pain and dysuria for the past two days. No fevers. WBC WNL. CT A/P with bilateral pyelonephritis phelgmon in the upper pole of the right kidney, no drainable fluid collection.     Recommend:  #Recurrent bilateral pyelonephritis phlegmon in the upper pole of the right kidney  -No drainable fluid collection  -Prior admission for ESBL E. coli bacteremia from pyelo  -Continue ertapenem  -Monitor wbc and fever curve  -May need US kidneys in a few days to assess for evolving abscess - will need to follow clinically  -F/U blood and urine cultures    Mohan Suh MD  Pager [phone]  After 5pm/weekends call [phone]    Discussed plan with primary team.

## 2021-06-02 NOTE — PATIENT PROFILE ADULT - FOOD INSECURITY
[facility] [address]    [id number]  [address]    Phone:  [phone]    Fax:  [phone]       Thank You for choosing us for your health care visit. We are glad to serve you and happy to provide you with this summary of your visit. Please help us to ensure we have accurate records. If you find anything that needs to be changed, please let our staff know as soon as possible.          Your Demographic Information     Patient Name Sex Tashi Vanessa Male 1944       Ethnic Group Patient Race    Not of  or  Origin White      Your Visit Details     Date & Time Provider Department    2017 12:15 PM Maryanne Garcias MD [facility] [address]      Your Upcoming Appointment*(Max 10)     2017  1:30 PM CDT   Follow-up Visit with Maryanne Garcisa MD   [facility] [address] ([place])    [id number]  [facility] [id number] [phone]            2017  1:15 PM CDT   Complete Ophthalmology Exam with Chase Estrada OD   [facility] ([facility])    [address] [place] [address]   [phone]            2017 11:15 AM CST Medicare Well Visit with Pavel Hong MD   [facility] ([facility])    [address] [place] [address]   [phone]              Your To Do List     Follow-Up    Return if symptoms worsen or fail to improve.      Conditions Discussed Today or Order-Related Diagnoses        Comments    Neoplasm of uncertain behavior of skin    -  Primary       Your Vitals Were     Smoking Status                   Former Smoker           Medications Prescribed or Re-Ordered Today     None      Your Current Medications Are        Disp Refills Start End    simvastatin (ZOCOR) 40 MG tablet 90 tablet 1 1/13/2017     Sig - Route: Take 1 tablet by mouth nightly. - Oral    Class: Eprescribe    lisinopril (ZESTRIL) 10 MG tablet 90 tablet 1 1/13/2017 7/12/2017    Sig - Route: Take 1 tablet by mouth daily. - Oral    Class: Eprescribe    metformin (GLUCOPHAGE-XR) 500 MG 24 hr tablet 360 tablet 1 12/9/2016     Sig - Route: Take 2 tablets by mouth 2 times daily (with meals). - Oral    Class: Eprescribe    meloxicam (MOBIC) 15 MG tablet 90 tablet 1 12/2/2016 5/31/2017    Sig - Route: Take 1 tablet by mouth daily. - Oral    Class: Eprescribe    traMADOL (ULTRAM) 50 MG tablet 60 tablet 0 11/17/2016     Sig - Route: Take 1 tablet by mouth every 8 hours as needed for Pain. - Oral    scopolamine (TRANSDERM_SCOP) 1 MG/3DAYS patch 10 patch 12 11/17/2016     Sig - Route: Place 1 patch onto the skin every 72 hours. - Transdermal    Class: Eprescribe    Cholecalciferol (VITAMIN D PO)        Class: Historical Med    Route: Oral    Ascorbic Acid (VITAMIN C PO)        Class: Historical Med    Route: Oral    Omega-3 Fatty Acids (FISH OIL PO)        Class: Historical Med    Route: Oral    DISPENSE 1 each 0 9/14/2015     Sig: ONE TOUCH ULTRA 2 METER to be used to check blood sugar daily    Class: Eprescribe    Cosign for Ordering: Accepted by Pavel Hong MD on 9/14/2015  9:19 AM      Discontinued Medications        Reason for Discontinue    minocycline (MINOCIN,DYNACIN) 100 MG capsule Therapy Completed      Allergies     No Known Allergies      Immunizations History as of 4/24/2017     Name Date    HERPES ZOSTER SHINGLES 5/15/2008    Influenza 9/14/2016, 9/22/2015, 10/22/2014 11:55 AM, 10/2/2013, 10/6/2011, 11/15/2010, 12/15/2009, 11/5/2008, 12/14/2007, 11/13/2006, 12/15/2005, 12/17/2004, 11/12/2003    Influenza A novel H1N1 1/19/2010    Pneumococcal Conjugate 13 Valent 7/22/2016    Pneumococcal Polysaccharide Adult 6/17/2010, 5/15/2008    Tdap 9/9/2004      Problem  List as of 4/24/2017     History of nonmelanoma skin cancer    No diabetic retinopathy in both eyes    Cataracts, bilateral    Conjunctival cyst of right eye    PVD (posterior vitreous detachment), both eyes    History of excision of left lower and upper eyelid lesions 12/12    Hyperopia    Regular astigmatism of left eye    Presbyopia OU    Hypercholesterolemia    Hypertension associated with diabetes    Type 2 diabetes mellitus, controlled              Patient Instructions    Dermatology Post-Excision Wound Care with Skin Adhesive and  Tegaderm Dressing    Topical skin adhesive (2-octyl cyanoacrylate) is a sterile, liquid skin adhesive that holds wound edges together.  The film will usually remain in place for 5-10 days, then naturally fall off your skin.  Wound Care:  1. Remove pressure dressing the next day, before showering for the first time.  2. Leave clear, plastic bandage (Tegaderm) in place over non-stick gauze.  3. Tegaderm is waterproof and will most likely stay in place for 1 week.  After 1 week you may remove Tegaderm.  4. If Tegaderm falls off prior to 1 week, wash incision once a day with mild soap and water, and cover with Band-Aid.  Do not place tape directly over the skin adhesive film, because removing the tape later may also remove the film.  5. Do not scratch, rub or pick at the adhesive film.  Do not use Vaseline over film. This may loosen the film before wound is healed.  6. Protect the wound from prolonged sun exposure while film in place.  7. Do not soak or scrub wound, do not swim and avoid heavy perspiration until the adhesive film has naturally fallen off.  8. Limit activity if procedure done to high tension areas (i.e., back, chest, arms, legs) for 2 weeks to prevent widening of scar or opening of incision.    Other Instructions  Occasionally, after the numbing medicine wears off, there might be some minor bleeding in the area for the first 24 hours.  If bleeding occurs, apply pressure  to area with your fingers and gauze or a soft cloth for 15 minutes continuously, NO PEEKING!! If bleeding persists, reapply pressure for an additional 15 minutes.  If the bleeding does not stop, call the Dermatology clinic.    Pain Control  Following surgery, the discomfort is usually mild.  Postoperative pain can be reduced by taking extra strength acetaminophen (Tylenol) 500 mg 2 tablets with ibuprofen (Motrin) 200 mg 3 tablets three times daily for the first 3 days after surgery.  This is more effective and has fewer side-effects than taking narcotics for pain.  Please do not take these medications if your doctor has specifically asked you NOT to take them.    If you are already taking daily aspirin or other blood thinners you do NOT have to discontinue your daily use.  You may also apply an ice pack to the area to decrease swelling and discomfort.  Apply ice 15 minutes out of every hour, as needed.  Any procedure done near the eyes, nose or on the face will most likely result in bruising or “black eye(s)” and considerable swelling.  This is normal and should fade within 2-3 weeks.    When should I notify the clinic?  You should call the Dermatology clinic if any of the following occur:  1. Bleeding that persists despite pressure applied as instructed above.  2. Pus noted at wound.  3. Significant redness or swelling that continues to spread.  4. Fever, chills, or flu-like symptoms.  5. The wound edges reopen or separate.    For any concerns, call the Dermatology clinic at:  · [phone], during business hours Monday-Friday    OR  If you have not received your biopsy results within 2 weeks after the procedure         no

## 2021-06-02 NOTE — CONSULT NOTE ADULT - SUBJECTIVE AND OBJECTIVE BOX
HPI:  65F with PMH DM2, HTN, ?KASIA, depression, recently admitted on  for AMS from hypoglycemia and found to have ESBL E.coli bacteremia d/t L pyelonephritis, treated with 7 days of ertapenem and discharged on , presents with worsening left flank pain associated with chills, associated with urinary frequency and urgency, no subjective fever. Pt reports pain recurred 2 days after discharge and progressively worsened.     In ED, pt is afebrile, WBC 10.2, UA small LE, CT abd/pelvis Bilateral pyelonephritis phlegmon in the upper pole the right kidney. No drainable fluid collection. Patient was cultured, given 1L NS bolus, and given a dose of ertapenem.     PAST MEDICAL & SURGICAL HISTORY:  HTN (hypertension)    DM (diabetes mellitus)    Hypercholesteremia    No significant past surgical history    Allergies    ACE inhibitors (Angioedema)    Intolerances    ANTIMICROBIALS:      OTHER MEDS:  amLODIPine   Tablet 10 milliGRAM(s) Oral daily  aspirin enteric coated 81 milliGRAM(s) Oral daily  atorvastatin 20 milliGRAM(s) Oral at bedtime  dextrose 40% Gel 15 Gram(s) Oral once  dextrose 5%. 1000 milliLiter(s) IV Continuous <Continuous>  dextrose 5%. 1000 milliLiter(s) IV Continuous <Continuous>  dextrose 50% Injectable 25 Gram(s) IV Push once  dextrose 50% Injectable 12.5 Gram(s) IV Push once  dextrose 50% Injectable 25 Gram(s) IV Push once  enoxaparin Injectable 40 milliGRAM(s) SubCutaneous daily  glucagon  Injectable 1 milliGRAM(s) IntraMuscular once  hydrochlorothiazide 12.5 milliGRAM(s) Oral daily  insulin lispro (ADMELOG) corrective regimen sliding scale   SubCutaneous three times a day before meals  insulin lispro (ADMELOG) corrective regimen sliding scale   SubCutaneous at bedtime  lactated ringers. 1000 milliLiter(s) IV Continuous <Continuous>    SOCIAL HISTORY: Denies smoking, alcohol, drug use.    FAMILY HISTORY:  FH: breast cancer (Mother)    Drug Dosing Weight  Height (cm): 172.7 (2021 15:02)  Weight (kg): 86.2 (2021 15:02)  BMI (kg/m2): 28.9 (2021 15:02)  BSA (m2): 2 (2021 15:02)    PE:    Vital Signs Last 24 Hrs  T(C): 36.6 (2021 12:22), Max: 36.8 (2021 10:09)  T(F): 97.9 (2021 12:22), Max: 98.2 (2021 10:09)  HR: 80 (2021 12:22) (80 - 100)  BP: 158/93 (2021 12:22) (152/76 - 168/102)  BP(mean): --  RR: 17 (2021 12:22) (17 - 18)  SpO2: 99% (2021 12:22) (98% - 100%)    Gen: AOx3, NAD  CV: S1+S2 normal, no murmurs  Resp: Clear bilat, no resp distress  Abd: Soft, nontender, +BS  Ext: No LE edema, no wounds  : No Mcwilliams, left cva tenderness  IV/Skin: No thrombophlebitis  Msk: No low back pain, no arthralgias, no joint swelling  Neuro: No sensory deficits, no motor deficits    LABS:                          12.8   10.21 )-----------( 453      ( 2021 03:49 )             39.4       06-02    136  |  98  |  17  ----------------------------<  141<H>  4.1   |  22  |  0.96    Ca    10.0      2021 03:49    TPro  8.5<H>  /  Alb  4.2  /  TBili  0.5  /  DBili  x   /  AST  28  /  ALT  43<H>  /  AlkPhos  125<H>  06-02      Urinalysis Basic - ( 2021 03:49 )    Color: Light Yellow / Appearance: Clear / S.014 / pH: x  Gluc: x / Ketone: Negative  / Bili: Negative / Urobili: <2 mg/dL   Blood: x / Protein: Negative / Nitrite: Negative   Leuk Esterase: Small / RBC: 0 /HPF / WBC 0-5 /HPF   Sq Epi: x / Non Sq Epi: 3 /HPF / Bacteria: Negative        MICROBIOLOGY:  v  .Blood Blood-Venous  21   No Growth Final  --  --      .Blood Blood-Peripheral  21   No Growth Final  --  --      .Blood Blood  21   Growth in aerobic bottle: Escherichia coli ESBL  MDRO detected in BCID PCR, resistance marker = CTX-M (ESBL)  ***Blood Panel PCR results on this specimen are available  approximately 3 hours after the Gram stain result.***  Gram stain, PCR, and/or culture results may not always  correspond due to difference in methodologies.  ************************************************************  This PCR assay was performed by multiplex PCR. This  Assay tests for 66 bacterial and resistance gene targets.  Please refer to the [facility] test directory  at [email or web address] for details.  --  Blood Culture PCR  Escherichia coli ESBL      .Blood Blood  21   No Growth Final  --  --      .Urine Clean Catch (Midstream)  21   >100,000 CFU/ml Escherichia coli ESBL  --  Escherichia coli ESBL    RADIOLOGY:    < from: CT Abdomen and Pelvis w/ IV Cont (21 @ 06:07) >  IMPRESSION:  Bilateral pyelonephritis phlegmon in the upper pole the right kidney. No drainable fluid collection.    < end of copied text >

## 2021-06-02 NOTE — H&P ADULT - NSHPREVIEWOFSYSTEMS_GEN_ALL_CORE
CONSTITUTIONAL: No fever, weight loss, or fatigue, +chills  EYES: No eye pain, visual disturbances, or discharge  ENMT:  No difficulty hearing, tinnitus, vertigo; No sinus or throat pain  NECK: No pain or stiffness  BREASTS: No pain, masses, or nipple discharge  RESPIRATORY: No cough, wheezing, chills or hemoptysis; No shortness of breath  CARDIOVASCULAR: No chest pain, palpitations, dizziness, or leg swelling  GASTROINTESTINAL: left flank pain, No nausea, vomiting, or hematemesis; No diarrhea or constipation. No melena or hematochezia.  GENITOURINARY: + dysuria, urinary frequency and urgency, no hematuria, or incontinence  NEUROLOGICAL: No headaches, memory loss, loss of strength, numbness, or tremors  SKIN: No itching, burning, rashes, or lesions   LYMPH NODES: No enlarged glands  ENDOCRINE: No heat or cold intolerance; No hair loss  MUSCULOSKELETAL: No muscle or back pain  PSYCHIATRIC: No depression, anxiety, mood swings, or difficulty sleeping  HEME/LYMPH: No easy bruising, or bleeding gums  ALLERGY AND IMMUNOLOGIC: No hives or eczema

## 2021-06-03 ENCOUNTER — TRANSCRIPTION ENCOUNTER (OUTPATIENT)
Age: 66
End: 2021-06-03

## 2021-06-03 LAB
ALBUMIN SERPL ELPH-MCNC: 4.1 G/DL — SIGNIFICANT CHANGE UP (ref 3.3–5)
ALP SERPL-CCNC: 107 U/L — SIGNIFICANT CHANGE UP (ref 40–120)
ALT FLD-CCNC: 35 U/L — HIGH (ref 4–33)
ANION GAP SERPL CALC-SCNC: 13 MMOL/L — SIGNIFICANT CHANGE UP (ref 7–14)
AST SERPL-CCNC: 19 U/L — SIGNIFICANT CHANGE UP (ref 4–32)
BASOPHILS # BLD AUTO: 0.04 K/UL — SIGNIFICANT CHANGE UP (ref 0–0.2)
BASOPHILS NFR BLD AUTO: 0.6 % — SIGNIFICANT CHANGE UP (ref 0–2)
BILIRUB SERPL-MCNC: 0.6 MG/DL — SIGNIFICANT CHANGE UP (ref 0.2–1.2)
BUN SERPL-MCNC: 15 MG/DL — SIGNIFICANT CHANGE UP (ref 7–23)
CALCIUM SERPL-MCNC: 9.3 MG/DL — SIGNIFICANT CHANGE UP (ref 8.4–10.5)
CHLORIDE SERPL-SCNC: 101 MMOL/L — SIGNIFICANT CHANGE UP (ref 98–107)
CO2 SERPL-SCNC: 24 MMOL/L — SIGNIFICANT CHANGE UP (ref 22–31)
COVID-19 SPIKE DOMAIN AB INTERP: POSITIVE
COVID-19 SPIKE DOMAIN ANTIBODY RESULT: >250 U/ML — HIGH
CREAT SERPL-MCNC: 0.98 MG/DL — SIGNIFICANT CHANGE UP (ref 0.5–1.3)
EOSINOPHIL # BLD AUTO: 0.05 K/UL — SIGNIFICANT CHANGE UP (ref 0–0.5)
EOSINOPHIL NFR BLD AUTO: 0.7 % — SIGNIFICANT CHANGE UP (ref 0–6)
GLUCOSE SERPL-MCNC: 132 MG/DL — HIGH (ref 70–99)
HCT VFR BLD CALC: 39.5 % — SIGNIFICANT CHANGE UP (ref 34.5–45)
HGB BLD-MCNC: 13 G/DL — SIGNIFICANT CHANGE UP (ref 11.5–15.5)
IANC: 4.57 K/UL — SIGNIFICANT CHANGE UP (ref 1.5–8.5)
IMM GRANULOCYTES NFR BLD AUTO: 0.1 % — SIGNIFICANT CHANGE UP (ref 0–1.5)
LYMPHOCYTES # BLD AUTO: 1.95 K/UL — SIGNIFICANT CHANGE UP (ref 1–3.3)
LYMPHOCYTES # BLD AUTO: 27.9 % — SIGNIFICANT CHANGE UP (ref 13–44)
MAGNESIUM SERPL-MCNC: 1.6 MG/DL — SIGNIFICANT CHANGE UP (ref 1.6–2.6)
MCHC RBC-ENTMCNC: 27.2 PG — SIGNIFICANT CHANGE UP (ref 27–34)
MCHC RBC-ENTMCNC: 32.9 GM/DL — SIGNIFICANT CHANGE UP (ref 32–36)
MCV RBC AUTO: 82.6 FL — SIGNIFICANT CHANGE UP (ref 80–100)
MONOCYTES # BLD AUTO: 0.36 K/UL — SIGNIFICANT CHANGE UP (ref 0–0.9)
MONOCYTES NFR BLD AUTO: 5.2 % — SIGNIFICANT CHANGE UP (ref 2–14)
NEUTROPHILS # BLD AUTO: 4.57 K/UL — SIGNIFICANT CHANGE UP (ref 1.8–7.4)
NEUTROPHILS NFR BLD AUTO: 65.5 % — SIGNIFICANT CHANGE UP (ref 43–77)
NRBC # BLD: 0 /100 WBCS — SIGNIFICANT CHANGE UP
NRBC # FLD: 0 K/UL — SIGNIFICANT CHANGE UP
PHOSPHATE SERPL-MCNC: 3.6 MG/DL — SIGNIFICANT CHANGE UP (ref 2.5–4.5)
PLATELET # BLD AUTO: 425 K/UL — HIGH (ref 150–400)
POTASSIUM SERPL-MCNC: 3.8 MMOL/L — SIGNIFICANT CHANGE UP (ref 3.5–5.3)
POTASSIUM SERPL-SCNC: 3.8 MMOL/L — SIGNIFICANT CHANGE UP (ref 3.5–5.3)
PROT SERPL-MCNC: 7.8 G/DL — SIGNIFICANT CHANGE UP (ref 6–8.3)
RBC # BLD: 4.78 M/UL — SIGNIFICANT CHANGE UP (ref 3.8–5.2)
RBC # FLD: 14.1 % — SIGNIFICANT CHANGE UP (ref 10.3–14.5)
SARS-COV-2 IGG+IGM SERPL QL IA: >250 U/ML — HIGH
SARS-COV-2 IGG+IGM SERPL QL IA: POSITIVE
SODIUM SERPL-SCNC: 138 MMOL/L — SIGNIFICANT CHANGE UP (ref 135–145)
WBC # BLD: 6.98 K/UL — SIGNIFICANT CHANGE UP (ref 3.8–10.5)
WBC # FLD AUTO: 6.98 K/UL — SIGNIFICANT CHANGE UP (ref 3.8–10.5)

## 2021-06-03 PROCEDURE — 99232 SBSQ HOSP IP/OBS MODERATE 35: CPT

## 2021-06-03 PROCEDURE — 99233 SBSQ HOSP IP/OBS HIGH 50: CPT | Mod: GC

## 2021-06-03 RX ORDER — POLYETHYLENE GLYCOL 3350 17 G/17G
17 POWDER, FOR SOLUTION ORAL DAILY
Refills: 0 | Status: DISCONTINUED | OUTPATIENT
Start: 2021-06-03 | End: 2021-06-04

## 2021-06-03 RX ORDER — MEROPENEM 1 G/30ML
1000 INJECTION INTRAVENOUS EVERY 8 HOURS
Refills: 0 | Status: DISCONTINUED | OUTPATIENT
Start: 2021-06-03 | End: 2021-06-04

## 2021-06-03 RX ORDER — ZOLPIDEM TARTRATE 10 MG/1
5 TABLET ORAL AT BEDTIME
Refills: 0 | Status: DISCONTINUED | OUTPATIENT
Start: 2021-06-03 | End: 2021-06-04

## 2021-06-03 RX ORDER — SIMETHICONE 80 MG/1
80 TABLET, CHEWABLE ORAL EVERY 12 HOURS
Refills: 0 | Status: DISCONTINUED | OUTPATIENT
Start: 2021-06-03 | End: 2021-06-04

## 2021-06-03 RX ADMIN — MEROPENEM 100 MILLIGRAM(S): 1 INJECTION INTRAVENOUS at 14:12

## 2021-06-03 RX ADMIN — Medication 3 MILLIGRAM(S): at 22:19

## 2021-06-03 RX ADMIN — POLYETHYLENE GLYCOL 3350 17 GRAM(S): 17 POWDER, FOR SOLUTION ORAL at 17:19

## 2021-06-03 RX ADMIN — SIMETHICONE 80 MILLIGRAM(S): 80 TABLET, CHEWABLE ORAL at 14:12

## 2021-06-03 RX ADMIN — Medication 81 MILLIGRAM(S): at 13:00

## 2021-06-03 RX ADMIN — Medication 15 MILLIGRAM(S): at 00:43

## 2021-06-03 RX ADMIN — Medication 15 MILLIGRAM(S): at 14:36

## 2021-06-03 RX ADMIN — ERTAPENEM SODIUM 120 MILLIGRAM(S): 1 INJECTION, POWDER, LYOPHILIZED, FOR SOLUTION INTRAMUSCULAR; INTRAVENOUS at 06:38

## 2021-06-03 RX ADMIN — AMLODIPINE BESYLATE 10 MILLIGRAM(S): 2.5 TABLET ORAL at 06:38

## 2021-06-03 RX ADMIN — ATORVASTATIN CALCIUM 20 MILLIGRAM(S): 80 TABLET, FILM COATED ORAL at 22:19

## 2021-06-03 RX ADMIN — MEROPENEM 100 MILLIGRAM(S): 1 INJECTION INTRAVENOUS at 22:19

## 2021-06-03 RX ADMIN — Medication 15 MILLIGRAM(S): at 14:12

## 2021-06-03 RX ADMIN — Medication 6 MILLIGRAM(S): at 00:35

## 2021-06-03 RX ADMIN — Medication 12.5 MILLIGRAM(S): at 06:38

## 2021-06-03 RX ADMIN — Medication 15 MILLIGRAM(S): at 01:13

## 2021-06-03 NOTE — PROGRESS NOTE ADULT - SUBJECTIVE AND OBJECTIVE BOX
CC: Patient is a 65y old  Female who presents with a chief complaint of recurrent L pyelonephritis (2021 14:47)    ID following for pyelonephritis    Interval History/ROS: Patient states bilateral flank pain improving .Dysuria almost resolved. Urine culture now with pseudomonas.    Rest of ROS negative.    Allergies  ACE inhibitors (Angioedema)    ANTIMICROBIALS:  meropenem  IVPB 1000 every 8 hours    OTHER MEDS:  acetaminophen   Tablet .. 650 milliGRAM(s) Oral every 6 hours PRN  amLODIPine   Tablet 10 milliGRAM(s) Oral daily  aspirin enteric coated 81 milliGRAM(s) Oral daily  atorvastatin 20 milliGRAM(s) Oral at bedtime  dextrose 40% Gel 15 Gram(s) Oral once  dextrose 5%. 1000 milliLiter(s) IV Continuous <Continuous>  dextrose 5%. 1000 milliLiter(s) IV Continuous <Continuous>  dextrose 50% Injectable 25 Gram(s) IV Push once  dextrose 50% Injectable 12.5 Gram(s) IV Push once  dextrose 50% Injectable 25 Gram(s) IV Push once  enoxaparin Injectable 40 milliGRAM(s) SubCutaneous daily  glucagon  Injectable 1 milliGRAM(s) IntraMuscular once  hydrochlorothiazide 12.5 milliGRAM(s) Oral daily  insulin lispro (ADMELOG) corrective regimen sliding scale   SubCutaneous three times a day before meals  insulin lispro (ADMELOG) corrective regimen sliding scale   SubCutaneous at bedtime  ketorolac   Injectable 15 milliGRAM(s) IV Push every 6 hours PRN  lactated ringers. 1000 milliLiter(s) IV Continuous <Continuous>  melatonin 3 milliGRAM(s) Oral at bedtime  simethicone 80 milliGRAM(s) Chew every 12 hours PRN    PE:    Vital Signs Last 24 Hrs  T(C): 36.5 (2021 13:30), Max: 36.7 (2021 15:49)  T(F): 97.7 (2021 13:30), Max: 98 (2021 15:49)  HR: 97 (2021 13:30) (77 - 97)  BP: 146/81 (2021 13:30) (144/72 - 173/94)  BP(mean): --  RR: 18 (2021 13:30) (17 - 18)  SpO2: 100% (2021 13:30) (98% - 100%)    Gen: AOx3, NAD  CV: S1+S2 normal, no murmurs  Resp: Clear bilat, no resp distress  Abd: Soft, nontender, +BS  Ext: No LE edema, no wounds  : No Mcwilliams, left cva tenderness  IV/Skin: No thrombophlebitis  Neuro: no focal deficits    LABS:                          13.0   6.98  )-----------( 425      ( 2021 07:00 )             39.5           138  |  101  |  15  ----------------------------<  132<H>  3.8   |  24  |  0.98    Ca    9.3      2021 07:00  Phos  3.6       Mg     1.6         TPro  7.8  /  Alb  4.1  /  TBili  0.6  /  DBili  x   /  AST  19  /  ALT  35<H>  /  AlkPhos  107  03      Urinalysis Basic - ( 2021 03:49 )    Color: Light Yellow / Appearance: Clear / S.014 / pH: x  Gluc: x / Ketone: Negative  / Bili: Negative / Urobili: <2 mg/dL   Blood: x / Protein: Negative / Nitrite: Negative   Leuk Esterase: Small / RBC: 0 /HPF / WBC 0-5 /HPF   Sq Epi: x / Non Sq Epi: 3 /HPF / Bacteria: Negative        MICROBIOLOGY:  v  .Blood Blood-Peripheral  21   No growth to date.  --  --      .Urine Clean Catch (Midstream)  21   10,000 - 49,000 CFU/mL Pseudomonas aeruginosa  <10,000 CFU/ml Normal Urogenital yahir present  --  --      .Blood Blood-Venous  21   No Growth Final  --  --      .Blood Blood-Peripheral  21   No Growth Final  --  --      .Blood Blood  21   Growth in aerobic bottle: Escherichia coli ESBL  MDRO detected in BCID PCR, resistance marker = CTX-M (ESBL)  ***Blood Panel PCR results on this specimen are available  approximately 3 hours after the Gram stain result.***  Gram stain, PCR, and/or culture results may not always  correspond due to difference in methodologies.  ************************************************************  This PCR assay was performed by multiplex PCR. This  Assay tests for 66 bacterial and resistance gene targets.  Please refer to the [facility] test directory  at [email or web address] for details.  --  Blood Culture PCR  Escherichia coli ESBL      .Blood Blood  21   No Growth Final  --  --      .Urine Clean Catch (Midstream)  21   >100,000 CFU/ml Escherichia coli ESBL  --  Escherichia coli ESBL    RADIOLOGY:    < from: CT Abdomen and Pelvis w/ IV Cont (21 @ 06:07) >  IMPRESSION:  Bilateral pyelonephritis phlegmon in the upper pole the right kidney. No drainable fluid collection.    < end of copied text >

## 2021-06-03 NOTE — DISCHARGE NOTE PROVIDER - CARE PROVIDER_API CALL
Primary Care Physician,   Phone: (   )    -  Fax: (   )    -  Follow Up Time: 1 week   Mohan Suh)  Internal Medicine  [address] [place] [address]  Phone: [phone]  Fax: [phone]  Established Patient  Follow Up Time: 1 week

## 2021-06-03 NOTE — PROGRESS NOTE ADULT - ATTENDING COMMENTS
66 yo F with hx of HTN, DM recent admission for ESBL ecoli UTI s/p ertapenem x 7 days p/w persistent dysuria and flank pain found to have b/l pyelonephritis  -CT a/p b/l pyelo but no drainable renal abscess   -urine cx grew pseudomonas. will start merrem for now  and f/u cx sensitivity    -will consider repeat imaging if symptoms do not improve  -ID consult appreciated

## 2021-06-03 NOTE — PROGRESS NOTE ADULT - PROBLEM SELECTOR PLAN 1
-last admission Bcx and Ucx grew ESBL E. coli 5/20, completed 7 day course of ertapenem on 5/28, repeat Bcx no growth on 5/23  -now recurrent pyelo w/ CT c/f b/l pyelonephritis, phlegmon in the upper pole of the right kidney  -start ertapenem  -IVF, f/u cultures (urine, blood)  -ID consulted, A/W ertapenem.   -likely needs repeat imaging with US at some point to r/o renal abscess or drainage collection, no drainable collection at this time  -No stone on imaging to explain recurrent Pyelo. -last admission Bcx and Ucx grew ESBL E. coli 5/20, completed 7 day course of ertapenem on 5/28, repeat Bcx no growth on 5/23  -now imaging c/w pyelo w/ CT c/f b/l pyelonephritis, phlegmon in the upper pole of the right kidney. Ua weakly +LE otherwise clean, Ucx growing 10-49k G- rods likely same organism, will f/u sensitivities.   -F/U Bcx  -C/W ertapenem for now, f/u further ID recs  -IVF,   -May need repeat imaging with US at some point to r/o renal abscess or drainage collection, no drainable collection at this time  -No stone on imaging to explain recurrent Pyelo.

## 2021-06-03 NOTE — DISCHARGE NOTE PROVIDER - NSDCCPCAREPLAN_GEN_ALL_CORE_FT
PRINCIPAL DISCHARGE DIAGNOSIS  Diagnosis: Pyelonephritis  Assessment and Plan of Treatment: You came in with an infection of your kidneys. You were treated with IV antibiotics in the hospital. You will be discharged on an oral antibiotic called ___ to complete a 14 day course ending on _____. Please follow up with your PCP within 1-2 weeks for repeat labwork and checkup.       PRINCIPAL DISCHARGE DIAGNOSIS  Diagnosis: Pyelonephritis  Assessment and Plan of Treatment: You came in with an infection of your kidneys. You were treated with IV antibiotics in the hospital. You will be discharged on an oral antibiotic called Ciprofloxacin, which should be taken as one tablet twice per day to complete a 10 day course ending on 6/12/21. Please follow up with your PCP within 1-2 weeks for repeat labwork and checkup.       PRINCIPAL DISCHARGE DIAGNOSIS  Diagnosis: Pyelonephritis  Assessment and Plan of Treatment: You came in with an infection of your kidneys. You were treated with IV antibiotics in the hospital. You will be discharged on an oral antibiotic called Ciprofloxacin, which should be taken as one tablet twice per day to complete a 10 day course ending on 6/12/21. Please follow up with your PCP within 1-2 weeks for repeat labwork and checkup. You should also follow up with Dr. Suh with infectious disease for a checkup and for repeat imaging of your kidneys within 1-2 weeks.

## 2021-06-03 NOTE — DISCHARGE NOTE PROVIDER - PROVIDER TOKENS
FREE:[LAST:[Primary Care Physician],PHONE:[(   )    -],FAX:[(   )    -],FOLLOWUP:[1 week]] PROVIDER:[TOKEN:[[id number]],FOLLOWUP:[1 week],ESTABLISHEDPATIENT:[T]]

## 2021-06-03 NOTE — DISCHARGE NOTE PROVIDER - NSFOLLOWUPCLINICS_GEN_ALL_ED_FT
[facility] [place]  Internal Medicine  [address] [place] [address]  Phone: [phone]  Fax: [phone]  Follow Up Time: 1 week

## 2021-06-03 NOTE — DISCHARGE NOTE PROVIDER - NSDCMRMEDTOKEN_GEN_ALL_CORE_FT
amLODIPine 10 mg oral tablet: 1 tab(s) orally once a day  Aspirin Enteric Coated 81 mg oral delayed release tablet: 1 tab(s) orally once a day  atorvastatin 20 mg oral tablet: 1 tab(s) orally once a day  hydroCHLOROthiazide 12.5 mg oral capsule: 1 cap(s) orally once a day  metFORMIN 500 mg oral tablet: 1 tab(s) orally 2 times a day   amLODIPine 10 mg oral tablet: 1 tab(s) orally once a day  Aspirin Enteric Coated 81 mg oral delayed release tablet: 1 tab(s) orally once a day  atorvastatin 20 mg oral tablet: 1 tab(s) orally once a day  ciprofloxacin 750 mg oral tablet: 1 tab(s) orally every 12 hours ending on 6/12  hydroCHLOROthiazide 12.5 mg oral capsule: 1 cap(s) orally once a day  metFORMIN 500 mg oral tablet: 1 tab(s) orally 2 times a day  polyethylene glycol 3350 oral powder for reconstitution: 17 gram(s) orally once a day, As needed, Constipation

## 2021-06-03 NOTE — PROGRESS NOTE ADULT - SUBJECTIVE AND OBJECTIVE BOX
PROGRESS NOTE:   Authored by Joseluis Baez MD, PGY1 [facility] Pager [phone] [facility] pager [phone]    Patient is a 65y old  Female who presents with a chief complaint of recurrent L pyelonephritis (2021 15:08)      SUBJECTIVE / OVERNIGHT EVENTS:     REVIEW OF SYSTEMS:    CONSTITUTIONAL: No weakness, fevers or chills  EYES/ENT: No visual changes;  No vertigo or throat pain   NECK: No pain or stiffness  RESPIRATORY: No cough, wheezing, hemoptysis; No shortness of breath  CARDIOVASCULAR: No chest pain or palpitations  GASTROINTESTINAL: No abdominal or epigastric pain. No nausea, vomiting, or hematemesis; No diarrhea or constipation. No melena or hematochezia.  GENITOURINARY: No dysuria, frequency or hematuria  NEUROLOGICAL: No numbness or weakness  SKIN: No itching, rashes    MEDICATIONS  (STANDING):  amLODIPine   Tablet 10 milliGRAM(s) Oral daily  aspirin enteric coated 81 milliGRAM(s) Oral daily  atorvastatin 20 milliGRAM(s) Oral at bedtime  dextrose 40% Gel 15 Gram(s) Oral once  dextrose 5%. 1000 milliLiter(s) (50 mL/Hr) IV Continuous <Continuous>  dextrose 5%. 1000 milliLiter(s) (100 mL/Hr) IV Continuous <Continuous>  dextrose 50% Injectable 25 Gram(s) IV Push once  dextrose 50% Injectable 12.5 Gram(s) IV Push once  dextrose 50% Injectable 25 Gram(s) IV Push once  enoxaparin Injectable 40 milliGRAM(s) SubCutaneous daily  ertapenem  IVPB 1000 milliGRAM(s) IV Intermittent every 24 hours  glucagon  Injectable 1 milliGRAM(s) IntraMuscular once  hydrochlorothiazide 12.5 milliGRAM(s) Oral daily  insulin lispro (ADMELOG) corrective regimen sliding scale   SubCutaneous three times a day before meals  insulin lispro (ADMELOG) corrective regimen sliding scale   SubCutaneous at bedtime  lactated ringers. 1000 milliLiter(s) (100 mL/Hr) IV Continuous <Continuous>  melatonin 3 milliGRAM(s) Oral at bedtime    MEDICATIONS  (PRN):  acetaminophen   Tablet .. 650 milliGRAM(s) Oral every 6 hours PRN Temp greater or equal to 38C (100.4F), Mild Pain (1 - 3)  ketorolac   Injectable 15 milliGRAM(s) IV Push every 6 hours PRN moderate to severe pain      CAPILLARY BLOOD GLUCOSE      POCT Blood Glucose.: 134 mg/dL (2021 08:22)  POCT Blood Glucose.: 120 mg/dL (2021 21:44)  POCT Blood Glucose.: 114 mg/dL (2021 17:34)  POCT Blood Glucose.: 94 mg/dL (2021 12:19)    I&O's Summary      PHYSICAL EXAM:  Vital Signs Last 24 Hrs  T(C): 36.6 (2021 06:36), Max: 36.8 (2021 10:09)  T(F): 97.9 (2021 06:36), Max: 98.2 (2021 10:09)  HR: 81 (2021 06:36) (77 - 93)  BP: 148/71 (2021 06:36) (144/72 - 173/94)  BP(mean): --  RR: 17 (2021 06:36) (17 - 18)  SpO2: 98% (2021 06:36) (98% - 100%)    CONSTITUTIONAL: NAD, well-developed  RESPIRATORY: Normal respiratory effort; lungs are clear to auscultation bilaterally  CARDIOVASCULAR: Regular rate and rhythm, normal S1 and S2, no murmur/rub/gallop; No lower extremity edema; Peripheral pulses are 2+ bilaterally  ABDOMEN: Nontender to palpation, normoactive bowel sounds, no rebound/guarding; No hepatosplenomegaly  MUSCLOSKELETAL: no clubbing or cyanosis of digits; no joint swelling or tenderness to palpation  PSYCH: A+O to person, place, and time; affect appropriate  NEURO: Non-focal, no tremors  SKIN: No rashes    LABS:                        13.0   6.98  )-----------( 425      ( 2021 07:00 )             39.5     06-03    138  |  101  |  15  ----------------------------<  132<H>  3.8   |  24  |  0.98    Ca    9.3      2021 07:00  Phos  3.6     06-03  Mg     1.6     06-03    TPro  7.8  /  Alb  4.1  /  TBili  0.6  /  DBili  x   /  AST  19  /  ALT  35<H>  /  AlkPhos  107  06-03          Urinalysis Basic - ( 2021 03:49 )    Color: Light Yellow / Appearance: Clear / S.014 / pH: x  Gluc: x / Ketone: Negative  / Bili: Negative / Urobili: <2 mg/dL   Blood: x / Protein: Negative / Nitrite: Negative   Leuk Esterase: Small / RBC: 0 /HPF / WBC 0-5 /HPF   Sq Epi: x / Non Sq Epi: 3 /HPF / Bacteria: Negative        Culture - Urine (collected 2021 08:27)  Source: .Urine Clean Catch (Midstream)  Preliminary Report (2021 08:34):    10,000 - 49,000 CFU/mL Gram Negative Rods    <10,000 CFU/ml Normal Urogenital yahir present        RADIOLOGY & ADDITIONAL TESTS:  No new imaging or tests    COORDINATION OF CARE:  Care Discussed with Consultants/Other Providers [Y/N]:  Prior or Outpatient Records Reviewed [Y/N]:   PROGRESS NOTE:   Authored by Joseluis Baez MD, PGY1 [facility] Pager [phone] [facility] pager [phone]    Patient is a 65y old  Female who presents with a chief complaint of recurrent L pyelonephritis (2021 15:08)      SUBJECTIVE / OVERNIGHT EVENTS: No acute events overnight. Pt feels better today overall. Anxious. Still has some back pain worse Left lower back, but improved from yesterday. Notes dysuria and increased urinary frequency. Denies other symptoms including chest pian, SOB, dizziness, light headedness, abdominal pain, n/v/d.     REVIEW OF SYSTEMS:    CONSTITUTIONAL: No weakness, fevers or chills  EYES/ENT: No visual changes;  No vertigo or throat pain   NECK: No pain or stiffness  RESPIRATORY: No cough, wheezing, hemoptysis; No shortness of breath  CARDIOVASCULAR: No chest pain or palpitations  GASTROINTESTINAL: No abdominal or epigastric pain. No nausea, vomiting, or hematemesis; No diarrhea or constipation. No melena or hematochezia.  GENITOURINARY: +dysuria, frequency.  NEUROLOGICAL: No numbness or weakness. +Lower back pain  SKIN: No itching, rashes    MEDICATIONS  (STANDING):  amLODIPine   Tablet 10 milliGRAM(s) Oral daily  aspirin enteric coated 81 milliGRAM(s) Oral daily  atorvastatin 20 milliGRAM(s) Oral at bedtime  dextrose 40% Gel 15 Gram(s) Oral once  dextrose 5%. 1000 milliLiter(s) (50 mL/Hr) IV Continuous <Continuous>  dextrose 5%. 1000 milliLiter(s) (100 mL/Hr) IV Continuous <Continuous>  dextrose 50% Injectable 25 Gram(s) IV Push once  dextrose 50% Injectable 12.5 Gram(s) IV Push once  dextrose 50% Injectable 25 Gram(s) IV Push once  enoxaparin Injectable 40 milliGRAM(s) SubCutaneous daily  ertapenem  IVPB 1000 milliGRAM(s) IV Intermittent every 24 hours  glucagon  Injectable 1 milliGRAM(s) IntraMuscular once  hydrochlorothiazide 12.5 milliGRAM(s) Oral daily  insulin lispro (ADMELOG) corrective regimen sliding scale   SubCutaneous three times a day before meals  insulin lispro (ADMELOG) corrective regimen sliding scale   SubCutaneous at bedtime  lactated ringers. 1000 milliLiter(s) (100 mL/Hr) IV Continuous <Continuous>  melatonin 3 milliGRAM(s) Oral at bedtime    MEDICATIONS  (PRN):  acetaminophen   Tablet .. 650 milliGRAM(s) Oral every 6 hours PRN Temp greater or equal to 38C (100.4F), Mild Pain (1 - 3)  ketorolac   Injectable 15 milliGRAM(s) IV Push every 6 hours PRN moderate to severe pain      CAPILLARY BLOOD GLUCOSE      POCT Blood Glucose.: 134 mg/dL (2021 08:22)  POCT Blood Glucose.: 120 mg/dL (2021 21:44)  POCT Blood Glucose.: 114 mg/dL (2021 17:34)  POCT Blood Glucose.: 94 mg/dL (2021 12:19)    I&O's Summary      PHYSICAL EXAM:  Vital Signs Last 24 Hrs  T(C): 36.6 (2021 06:36), Max: 36.8 (2021 10:09)  T(F): 97.9 (2021 06:36), Max: 98.2 (2021 10:09)  HR: 81 (2021 06:36) (77 - 93)  BP: 148/71 (2021 06:36) (144/72 - 173/94)  BP(mean): --  RR: 17 (2021 06:36) (17 - 18)  SpO2: 98% (2021 06:36) (98% - 100%)    CONSTITUTIONAL: NAD, well-developed  RESPIRATORY: Normal respiratory effort; lungs are clear to auscultation bilaterally  CARDIOVASCULAR: Regular rate and rhythm, normal S1 and S2, no murmur/rub/gallop; No lower extremity edema; Peripheral pulses are 2+ bilaterally  ABDOMEN: Nontender to palpation, normoactive bowel sounds, no rebound/guarding; No hepatosplenomegaly  MUSCLOSKELETAL: no clubbing or cyanosis of digits; no joint swelling or tenderness to palpation. Minimal CVA tenderness on left.   PSYCH: A+O to person, place, and time; affect appropriate  NEURO: Non-focal, no tremors  SKIN: No rashes    LABS:                        13.0   6.98  )-----------( 425      ( 2021 07:00 )             39.5     06-03    138  |  101  |  15  ----------------------------<  132<H>  3.8   |  24  |  0.98    Ca    9.3      2021 07:00  Phos  3.6     06-03  Mg     1.6     06-    TPro  7.8  /  Alb  4.1  /  TBili  0.6  /  DBili  x   /  AST  19  /  ALT  35<H>  /  AlkPhos  107  06-03          Urinalysis Basic - ( 2021 03:49 )    Color: Light Yellow / Appearance: Clear / S.014 / pH: x  Gluc: x / Ketone: Negative  / Bili: Negative / Urobili: <2 mg/dL   Blood: x / Protein: Negative / Nitrite: Negative   Leuk Esterase: Small / RBC: 0 /HPF / WBC 0-5 /HPF   Sq Epi: x / Non Sq Epi: 3 /HPF / Bacteria: Negative        Culture - Urine (collected 2021 08:27)  Source: .Urine Clean Catch (Midstream)  Preliminary Report (2021 08:34):    10,000 - 49,000 CFU/mL Gram Negative Rods    <10,000 CFU/ml Normal Urogenital yahir present        RADIOLOGY & ADDITIONAL TESTS:  No new imaging or tests    COORDINATION OF CARE:  Care Discussed with Consultants/Other Providers [Y/N]:  Prior or Outpatient Records Reviewed [Y/N]:

## 2021-06-03 NOTE — DISCHARGE NOTE PROVIDER - HOSPITAL COURSE
HPI:  65F with PMH DM2, HTN, ?KASIA, depression, recently admitted on 5/20 for AMS i/s/o hypoglycemia and found to have ESBL E.coli bacteremia d/t  L pyelonephritis, treated with 7 days of ertapenem and discharged on 5/28, presents with worsening left flank pain associated with chills, associated with urinary frequency and urgency, no subjective fever. Pt reports pain recurred 2 days after discharge and progressively worsened.     In ED, pt is afebrile, WBC 10.2, UA small LE, CT abd/pelvis Bilateral pyelonephritis phlegmon in the upper pole the right kidney. No drainable fluid collection. Patient was cultured, given 1L NS bolus, and given a dose of ertapenem.    (02 Jun 2021 13:26)    Hospital course: Pt initially on ertepenem with improvement in symptoms. Bcx negative. Ucx growing 10-49K pseudomonas, so switched to meropenem. Sensitivities showing bacteria sensitive to __________. Pt transitioned to _____. Pt is stable and clear for discharge. She should follow up with her PCP within one week. HPI:  65F with PMH DM2, HTN, ?KASIA, depression, recently admitted on 5/20 for AMS i/s/o hypoglycemia and found to have ESBL E.coli bacteremia d/t  L pyelonephritis, treated with 7 days of ertapenem and discharged on 5/28, presents with worsening left flank pain associated with chills, associated with urinary frequency and urgency, no subjective fever. Pt reports pain recurred 2 days after discharge and progressively worsened.     In ED, pt is afebrile, WBC 10.2, UA small LE, CT abd/pelvis Bilateral pyelonephritis phlegmon in the upper pole the right kidney. No drainable fluid collection. Patient was cultured, given 1L NS bolus, and given a dose of ertapenem.    (02 Jun 2021 13:26)    Hospital course: Pt initially on ertepenem with improvement in symptoms. Bcx negative. Ucx growing 10-49K pseudomonas, so switched to meropenem. Sensitivities showing bacteria pansensitive. Pt transitioned to Cipro 750 BID to complete 10 day course ending 6/12 per ID recs. Pt is stable and clear for discharge. She should follow up with her PCP within one week. HPI:  65F with PMH DM2, HTN, ?KASIA, depression, recently admitted on 5/20 for AMS i/s/o hypoglycemia and found to have ESBL E.coli bacteremia d/t  L pyelonephritis, treated with 7 days of ertapenem and discharged on 5/28, presents with worsening left flank pain associated with chills, associated with urinary frequency and urgency, no subjective fever. Pt reports pain recurred 2 days after discharge and progressively worsened.     In ED, pt is afebrile, WBC 10.2, UA small LE, CT abd/pelvis Bilateral pyelonephritis phlegmon in the upper pole the right kidney. No drainable fluid collection. Patient was cultured, given 1L NS bolus, and given a dose of ertapenem.    (02 Jun 2021 13:26)    Hospital course: Pt initially on ertepenem with improvement in symptoms. Bcx negative. Ucx growing 10-49K pseudomonas, so switched to meropenem. Sensitivities showing bacteria pansensitive. Pt transitioned to Cipro 750 BID to complete 10 day course ending 6/12 per ID recs. Pt is stable and clear for discharge. She should follow up with her PCP within one week, as well as Dr. Suh with infectious disease for repeat imaging.

## 2021-06-04 ENCOUNTER — TRANSCRIPTION ENCOUNTER (OUTPATIENT)
Age: 66
End: 2021-06-04

## 2021-06-04 VITALS
HEART RATE: 86 BPM | TEMPERATURE: 98 F | SYSTOLIC BLOOD PRESSURE: 138 MMHG | DIASTOLIC BLOOD PRESSURE: 73 MMHG | OXYGEN SATURATION: 100 % | RESPIRATION RATE: 17 BRPM

## 2021-06-04 LAB
-  AMIKACIN: SIGNIFICANT CHANGE UP
-  AZTREONAM: SIGNIFICANT CHANGE UP
-  CEFEPIME: SIGNIFICANT CHANGE UP
-  CEFTAZIDIME: SIGNIFICANT CHANGE UP
-  CIPROFLOXACIN: SIGNIFICANT CHANGE UP
-  GENTAMICIN: SIGNIFICANT CHANGE UP
-  IMIPENEM: SIGNIFICANT CHANGE UP
-  LEVOFLOXACIN: SIGNIFICANT CHANGE UP
-  MEROPENEM: SIGNIFICANT CHANGE UP
-  PIPERACILLIN/TAZOBACTAM: SIGNIFICANT CHANGE UP
-  TOBRAMYCIN: SIGNIFICANT CHANGE UP
ANION GAP SERPL CALC-SCNC: 13 MMOL/L — SIGNIFICANT CHANGE UP (ref 7–14)
BUN SERPL-MCNC: 18 MG/DL — SIGNIFICANT CHANGE UP (ref 7–23)
CALCIUM SERPL-MCNC: 9.8 MG/DL — SIGNIFICANT CHANGE UP (ref 8.4–10.5)
CHLORIDE SERPL-SCNC: 102 MMOL/L — SIGNIFICANT CHANGE UP (ref 98–107)
CO2 SERPL-SCNC: 23 MMOL/L — SIGNIFICANT CHANGE UP (ref 22–31)
CREAT SERPL-MCNC: 1 MG/DL — SIGNIFICANT CHANGE UP (ref 0.5–1.3)
CULTURE RESULTS: SIGNIFICANT CHANGE UP
GLUCOSE SERPL-MCNC: 114 MG/DL — HIGH (ref 70–99)
HCT VFR BLD CALC: 40.1 % — SIGNIFICANT CHANGE UP (ref 34.5–45)
HGB BLD-MCNC: 13.1 G/DL — SIGNIFICANT CHANGE UP (ref 11.5–15.5)
MAGNESIUM SERPL-MCNC: 1.8 MG/DL — SIGNIFICANT CHANGE UP (ref 1.6–2.6)
MCHC RBC-ENTMCNC: 27.1 PG — SIGNIFICANT CHANGE UP (ref 27–34)
MCHC RBC-ENTMCNC: 32.7 GM/DL — SIGNIFICANT CHANGE UP (ref 32–36)
MCV RBC AUTO: 83 FL — SIGNIFICANT CHANGE UP (ref 80–100)
METHOD TYPE: SIGNIFICANT CHANGE UP
NRBC # BLD: 0 /100 WBCS — SIGNIFICANT CHANGE UP
NRBC # FLD: 0 K/UL — SIGNIFICANT CHANGE UP
ORGANISM # SPEC MICROSCOPIC CNT: SIGNIFICANT CHANGE UP
ORGANISM # SPEC MICROSCOPIC CNT: SIGNIFICANT CHANGE UP
PHOSPHATE SERPL-MCNC: 4.1 MG/DL — SIGNIFICANT CHANGE UP (ref 2.5–4.5)
PLATELET # BLD AUTO: 444 K/UL — HIGH (ref 150–400)
POTASSIUM SERPL-MCNC: 4 MMOL/L — SIGNIFICANT CHANGE UP (ref 3.5–5.3)
POTASSIUM SERPL-SCNC: 4 MMOL/L — SIGNIFICANT CHANGE UP (ref 3.5–5.3)
RBC # BLD: 4.83 M/UL — SIGNIFICANT CHANGE UP (ref 3.8–5.2)
RBC # FLD: 14 % — SIGNIFICANT CHANGE UP (ref 10.3–14.5)
SODIUM SERPL-SCNC: 138 MMOL/L — SIGNIFICANT CHANGE UP (ref 135–145)
SPECIMEN SOURCE: SIGNIFICANT CHANGE UP
WBC # BLD: 7.54 K/UL — SIGNIFICANT CHANGE UP (ref 3.8–10.5)
WBC # FLD AUTO: 7.54 K/UL — SIGNIFICANT CHANGE UP (ref 3.8–10.5)

## 2021-06-04 PROCEDURE — 99239 HOSP IP/OBS DSCHRG MGMT >30: CPT | Mod: GC

## 2021-06-04 PROCEDURE — 93010 ELECTROCARDIOGRAM REPORT: CPT

## 2021-06-04 PROCEDURE — 99232 SBSQ HOSP IP/OBS MODERATE 35: CPT

## 2021-06-04 RX ORDER — SENNA PLUS 8.6 MG/1
2 TABLET ORAL AT BEDTIME
Refills: 0 | Status: DISCONTINUED | OUTPATIENT
Start: 2021-06-04 | End: 2021-06-04

## 2021-06-04 RX ORDER — HYDROCHLOROTHIAZIDE 25 MG
1 TABLET ORAL
Qty: 30 | Refills: 0
Start: 2021-06-04 | End: 2021-07-03

## 2021-06-04 RX ORDER — METFORMIN HYDROCHLORIDE 850 MG/1
1 TABLET ORAL
Qty: 60 | Refills: 0
Start: 2021-06-04 | End: 2021-07-03

## 2021-06-04 RX ORDER — POLYETHYLENE GLYCOL 3350 17 G/17G
17 POWDER, FOR SOLUTION ORAL
Qty: 510 | Refills: 0
Start: 2021-06-04 | End: 2021-07-03

## 2021-06-04 RX ORDER — ATORVASTATIN CALCIUM 80 MG/1
1 TABLET, FILM COATED ORAL
Qty: 20 | Refills: 0
Start: 2021-06-04 | End: 2021-06-23

## 2021-06-04 RX ORDER — AMLODIPINE BESYLATE 2.5 MG/1
1 TABLET ORAL
Qty: 30 | Refills: 0
Start: 2021-06-04 | End: 2021-07-03

## 2021-06-04 RX ORDER — CIPROFLOXACIN LACTATE 400MG/40ML
1 VIAL (ML) INTRAVENOUS
Qty: 18 | Refills: 0
Start: 2021-06-04 | End: 2021-06-12

## 2021-06-04 RX ORDER — SENNA PLUS 8.6 MG/1
1 TABLET ORAL ONCE
Refills: 0 | Status: COMPLETED | OUTPATIENT
Start: 2021-06-04 | End: 2021-06-04

## 2021-06-04 RX ADMIN — Medication 12.5 MILLIGRAM(S): at 06:36

## 2021-06-04 RX ADMIN — AMLODIPINE BESYLATE 10 MILLIGRAM(S): 2.5 TABLET ORAL at 06:36

## 2021-06-04 RX ADMIN — ZOLPIDEM TARTRATE 5 MILLIGRAM(S): 10 TABLET ORAL at 00:09

## 2021-06-04 RX ADMIN — Medication 1: at 08:49

## 2021-06-04 RX ADMIN — Medication 81 MILLIGRAM(S): at 12:41

## 2021-06-04 RX ADMIN — SENNA PLUS 1 TABLET(S): 8.6 TABLET ORAL at 10:08

## 2021-06-04 RX ADMIN — MEROPENEM 100 MILLIGRAM(S): 1 INJECTION INTRAVENOUS at 06:36

## 2021-06-04 NOTE — PROGRESS NOTE ADULT - PROBLEM SELECTOR PLAN 1
-last admission Bcx and Ucx grew ESBL E. coli 5/20, completed 7 day course of ertapenem on 5/28, repeat Bcx no growth on 5/23  -now imaging c/w pyelo w/ CT c/f b/l pyelonephritis, phlegmon in the upper pole of the right kidney. Ua weakly +LE otherwise clean.  -Ucx growing 10-49k pseudomonas, abx switched to meropenem from ertepenem  -BCX NGTD  -F/U culture sensivitites   -IVF,   -May need repeat imaging with US at some point to r/o renal abscess or drainage collection, no drainable collection at this time  -No stone on imaging to explain recurrent Pyelo. -last admission Bcx and Ucx grew ESBL E. coli 5/20, completed 7 day course of ertapenem on 5/28, repeat Bcx no growth on 5/23  -now imaging c/w pyelo w/ CT c/f b/l pyelonephritis, phlegmon in the upper pole of the right kidney. Ua weakly +LE otherwise clean. No imaging on last admission to compare, so difficult to say if findings are c/w evolving pyelo or resolving infection.   -Ucx growing 10-49k pseudomonas, abx switched to meropenem from ertepenem  -BCX NGTD  -F/U culture sensivitites   -IVF,   -May need repeat imaging with US at some point to r/o renal abscess or drainage collection, no drainable collection at this time  -No stone on imaging to explain recurrent Pyelo.

## 2021-06-04 NOTE — PROGRESS NOTE ADULT - ATTENDING COMMENTS
64 yo F with hx of HTN, DM recent admission for ESBL ecoli UTI s/p ertapenem x 7 days p/w persistent dysuria and flank pain found to have b/l pyelonephritis  -CT a/p b/l pyelo but no drainable renal abscess   -urine cx grew sensitive pseudomonas. s/p merrem x 2 days. will discharge on cipro till 6.12   -f/u ID as out pt for repeat imaging after abx course  -ID consult appreciated  stable for discharge home today. total time spent on dc 37min

## 2021-06-04 NOTE — PROGRESS NOTE ADULT - SUBJECTIVE AND OBJECTIVE BOX
CC: Patient is a 65y old  Female who presents with a chief complaint of recurrent L pyelonephritis (04 Jun 2021 07:12)    ID following for recurrent pyelonephritis    Interval History/ROS: Patient ambulating in the room. States bilateral flank pain improved. Dysuria improved. Feels well.     Rest of ROS negative.    Allergies  ACE inhibitors (Angioedema)    ANTIMICROBIALS:  meropenem  IVPB 1000 every 8 hours    OTHER MEDS:  acetaminophen   Tablet .. 650 milliGRAM(s) Oral every 6 hours PRN  amLODIPine   Tablet 10 milliGRAM(s) Oral daily  aspirin enteric coated 81 milliGRAM(s) Oral daily  atorvastatin 20 milliGRAM(s) Oral at bedtime  dextrose 40% Gel 15 Gram(s) Oral once  dextrose 5%. 1000 milliLiter(s) IV Continuous <Continuous>  dextrose 5%. 1000 milliLiter(s) IV Continuous <Continuous>  dextrose 50% Injectable 25 Gram(s) IV Push once  dextrose 50% Injectable 12.5 Gram(s) IV Push once  dextrose 50% Injectable 25 Gram(s) IV Push once  enoxaparin Injectable 40 milliGRAM(s) SubCutaneous daily  glucagon  Injectable 1 milliGRAM(s) IntraMuscular once  hydrochlorothiazide 12.5 milliGRAM(s) Oral daily  insulin lispro (ADMELOG) corrective regimen sliding scale   SubCutaneous three times a day before meals  insulin lispro (ADMELOG) corrective regimen sliding scale   SubCutaneous at bedtime  ketorolac   Injectable 15 milliGRAM(s) IV Push every 6 hours PRN  lactated ringers. 1000 milliLiter(s) IV Continuous <Continuous>  melatonin 3 milliGRAM(s) Oral at bedtime  polyethylene glycol 3350 17 Gram(s) Oral daily PRN  senna 2 Tablet(s) Oral at bedtime  simethicone 80 milliGRAM(s) Chew every 12 hours PRN    PE:    Vital Signs Last 24 Hrs  T(C): 36.4 (04 Jun 2021 06:35), Max: 36.5 (03 Jun 2021 13:30)  T(F): 97.6 (04 Jun 2021 06:35), Max: 97.7 (03 Jun 2021 13:30)  HR: 84 (04 Jun 2021 06:35) (84 - 97)  BP: 143/87 (04 Jun 2021 06:35) (143/85 - 146/81)  BP(mean): --  RR: 17 (04 Jun 2021 06:35) (17 - 18)  SpO2: 97% (04 Jun 2021 06:35) (97% - 100%)    Gen: AOx3, NAD  CV: S1+S2 normal, no murmurs  Resp: Clear bilat, no resp distress  Abd: Soft, nontender, +BS  Ext: No LE edema, no wounds  : No Mcwilliams, no cva tenderness  IV/Skin: No thrombophlebitis  Neuro: no focal deficits    LABS:                          13.1   7.54  )-----------( 444      ( 04 Jun 2021 08:01 )             40.1       06-04    138  |  102  |  18  ----------------------------<  114<H>  4.0   |  23  |  1.00    Ca    9.8      04 Jun 2021 08:01  Phos  4.1     06-04  Mg     1.8     06-04    TPro  7.8  /  Alb  4.1  /  TBili  0.6  /  DBili  x   /  AST  19  /  ALT  35<H>  /  AlkPhos  107  06-03          MICROBIOLOGY:  v  .Blood Blood-Peripheral  06-02-21   No growth to date.  --  --      .Urine Clean Catch (Midstream)  06-02-21   10,000 - 49,000 CFU/mL Pseudomonas aeruginosa  <10,000 CFU/ml Normal Urogenital yahir present  --  Pseudomonas aeruginosa      .Blood Blood-Venous  05-23-21   No Growth Final  --  --      .Blood Blood-Peripheral  05-23-21   No Growth Final  --  --      .Blood Blood  05-20-21   Growth in aerobic bottle: Escherichia coli ESBL  MDRO detected in BCID PCR, resistance marker = CTX-M (ESBL)  ***Blood Panel PCR results on this specimen are available  approximately 3 hours after the Gram stain result.***  Gram stain, PCR, and/or culture results may not always  correspond due to difference in methodologies.  ************************************************************  This PCR assay was performed by multiplex PCR. This  Assay tests for 66 bacterial and resistance gene targets.  Please refer to the [facility] [employer] test directory  at [email or web address] for details.  --  Blood Culture PCR  Escherichia coli ESBL      .Blood Blood  05-20-21   No Growth Final  --  --      .Urine Clean Catch (Midstream)  05-20-21   >100,000 CFU/ml Escherichia coli ESBL  --  Escherichia coli ESBL    RADIOLOGY:    < from: CT Abdomen and Pelvis w/ IV Cont (06.02.21 @ 06:07) >  IMPRESSION:  Bilateral pyelonephritis phlegmon in the upper pole the right kidney. No drainable fluid collection.    < end of copied text >

## 2021-06-04 NOTE — DISCHARGE NOTE NURSING/CASE MANAGEMENT/SOCIAL WORK - PATIENT PORTAL LINK FT
You can access the FollowMyHealth Patient Portal offered by [facility] by registering at the following website: [email or web address]. By joining [facility]’s FollowMyHealth portal, you will also be able to view your health information using other applications (apps) compatible with our system.

## 2021-06-04 NOTE — PROGRESS NOTE ADULT - PROBLEM SELECTOR PLAN 3
-bp elevated d/t pain and distress  -c/w home norvasc and hctz  -last admission, pt had echo that showed normal LVEF 70%, stage 1 diastolic dysfunction; she was ruled out for CVA with negative CTA H/N and MRI brain
-bp elevated d/t pain and distress  -c/w home norvasc and hctz  -last admission, pt had echo that showed normal LVEF 70%, stage 1 diastolic dysfunction; she was ruled out for CVA with negative CTA H/N and MRI brain

## 2021-06-04 NOTE — PROGRESS NOTE ADULT - REASON FOR ADMISSION
[FreeTextEntry1] : reviewed labs with patient\par \par lung nodule - will fu in 12 months\par \par htn - on losartan for two weeks, feeling better - has more energy\par \par discussed anxiety with patient - does not want meds at this time\par will try psychologist and yoga
recurrent L pyelonephritis

## 2021-06-04 NOTE — PROGRESS NOTE ADULT - ASSESSMENT
65F with PMH DM2, HTN, ?KASIA, depression, recently admitted on 5/20 for AMS i/s/o hypoglycemia and found to have ESBL E.coli bacteremia d/t  pyelonephritis, treated with 7 days of ertapenem and discharged on 5/28, presents with recurrent pyelonephritis, CT c/f b/l pyelonephritis phlegmon in the upper pole the right kidney. 
65 year old female with DM2, HTN, Depression admitted from 5/20-5/28/21 for AMS from hypoglycemia and ESBL E. coli bacteremia from pyelonephritis completed 7 days of ertapenem, was home was having worsening bilateral flank pain and dysuria for the past two days. No fevers. WBC WNL. CT A/P with bilateral pyelonephritis phelgmon in the upper pole of the right kidney, no drainable fluid collection. Urine cx with Pseudomonas.    Recommend:  #Recurrent bilateral pyelonephritis phlegmon in the upper pole of the right kidney  -No drainable fluid collection  -Prior admission for ESBL E. coli bacteremia from pyelo, now with pseudomonas  -Monitor wbc and fever curve  -Clinically improved  -Blood cx no growth to date  -Can change meropenem to cipro 750 mg PO Q 12 hours to complete 6/12/2021.    Mohan Suh MD  Pager [phone]  After 5pm/weekends call [phone]      
65 year old female with DM2, HTN, Depression admitted from 5/20-5/28/21 for AMS from hypoglycemia and ESBL E. coli bacteremia from pyelonephritis completed 7 days of ertapenem, was home was having worsening bilateral flank pain and dysuria for the past two days. No fevers. WBC WNL. CT A/P with bilateral pyelonephritis phelgmon in the upper pole of the right kidney, no drainable fluid collection. Urine cx with Pseudomonas.    Recommend:  #Recurrent bilateral pyelonephritis phlegmon in the upper pole of the right kidney  -No drainable fluid collection  -Prior admission for ESBL E. coli bacteremia from pyelo, now with pseudomonas  -Change ertapenem to meropenem  -F/U sensitivities  -Monitor wbc and fever curve  -May need US kidneys in a few days to assess for evolving abscess - will need to follow clinically  -F/U blood and urine cultures    Mohan Suh MD  Pager [phone]  After 5pm/weekends call [phone]    Discussed plan with primary team.  
65F with PMH DM2, HTN, ?KASIA, depression, recently admitted on 5/20 for AMS i/s/o hypoglycemia and found to have ESBL E.coli bacteremia d/t  pyelonephritis, treated with 7 days of ertapenem and discharged on 5/28, presents with recurrent pyelonephritis, CT c/f b/l pyelonephritis phlegmon in the upper pole the right kidney.

## 2021-06-04 NOTE — PROGRESS NOTE ADULT - SUBJECTIVE AND OBJECTIVE BOX
PROGRESS NOTE:   Authored by Joseluis aBez MD, PGY1 [facility] Pager [phone] [facility] pager [phone]    Patient is a 65y old  Female who presents with a chief complaint of recurrent L pyelonephritis (03 Jun 2021 15:45)      SUBJECTIVE / OVERNIGHT EVENTS:     REVIEW OF SYSTEMS:    CONSTITUTIONAL: No weakness, fevers or chills  EYES/ENT: No visual changes;  No vertigo or throat pain   NECK: No pain or stiffness  RESPIRATORY: No cough, wheezing, hemoptysis; No shortness of breath  CARDIOVASCULAR: No chest pain or palpitations  GASTROINTESTINAL: No abdominal or epigastric pain. No nausea, vomiting, or hematemesis; No diarrhea or constipation. No melena or hematochezia.  GENITOURINARY: No dysuria, frequency or hematuria  NEUROLOGICAL: No numbness or weakness  SKIN: No itching, rashes    MEDICATIONS  (STANDING):  amLODIPine   Tablet 10 milliGRAM(s) Oral daily  aspirin enteric coated 81 milliGRAM(s) Oral daily  atorvastatin 20 milliGRAM(s) Oral at bedtime  dextrose 40% Gel 15 Gram(s) Oral once  dextrose 5%. 1000 milliLiter(s) (50 mL/Hr) IV Continuous <Continuous>  dextrose 5%. 1000 milliLiter(s) (100 mL/Hr) IV Continuous <Continuous>  dextrose 50% Injectable 25 Gram(s) IV Push once  dextrose 50% Injectable 12.5 Gram(s) IV Push once  dextrose 50% Injectable 25 Gram(s) IV Push once  enoxaparin Injectable 40 milliGRAM(s) SubCutaneous daily  glucagon  Injectable 1 milliGRAM(s) IntraMuscular once  hydrochlorothiazide 12.5 milliGRAM(s) Oral daily  insulin lispro (ADMELOG) corrective regimen sliding scale   SubCutaneous three times a day before meals  insulin lispro (ADMELOG) corrective regimen sliding scale   SubCutaneous at bedtime  lactated ringers. 1000 milliLiter(s) (100 mL/Hr) IV Continuous <Continuous>  melatonin 3 milliGRAM(s) Oral at bedtime  meropenem  IVPB 1000 milliGRAM(s) IV Intermittent every 8 hours  senna 1 Tablet(s) Oral once  senna 2 Tablet(s) Oral at bedtime    MEDICATIONS  (PRN):  acetaminophen   Tablet .. 650 milliGRAM(s) Oral every 6 hours PRN Temp greater or equal to 38C (100.4F), Mild Pain (1 - 3)  ketorolac   Injectable 15 milliGRAM(s) IV Push every 6 hours PRN moderate to severe pain  polyethylene glycol 3350 17 Gram(s) Oral daily PRN Constipation  simethicone 80 milliGRAM(s) Chew every 12 hours PRN Gas      CAPILLARY BLOOD GLUCOSE      POCT Blood Glucose.: 117 mg/dL (03 Jun 2021 21:27)  POCT Blood Glucose.: 118 mg/dL (03 Jun 2021 17:10)  POCT Blood Glucose.: 98 mg/dL (03 Jun 2021 12:22)  POCT Blood Glucose.: 134 mg/dL (03 Jun 2021 08:22)    I&O's Summary      PHYSICAL EXAM:  Vital Signs Last 24 Hrs  T(C): 36.4 (04 Jun 2021 06:35), Max: 36.5 (03 Jun 2021 13:30)  T(F): 97.6 (04 Jun 2021 06:35), Max: 97.7 (03 Jun 2021 13:30)  HR: 84 (04 Jun 2021 06:35) (84 - 97)  BP: 143/87 (04 Jun 2021 06:35) (143/85 - 146/81)  BP(mean): --  RR: 17 (04 Jun 2021 06:35) (17 - 18)  SpO2: 97% (04 Jun 2021 06:35) (97% - 100%)    CONSTITUTIONAL: NAD, well-developed  RESPIRATORY: Normal respiratory effort; lungs are clear to auscultation bilaterally  CARDIOVASCULAR: Regular rate and rhythm, normal S1 and S2, no murmur/rub/gallop; No lower extremity edema; Peripheral pulses are 2+ bilaterally  ABDOMEN: Nontender to palpation, normoactive bowel sounds, no rebound/guarding; No hepatosplenomegaly  MUSCLOSKELETAL: no clubbing or cyanosis of digits; no joint swelling or tenderness to palpation  PSYCH: A+O to person, place, and time; affect appropriate  NEURO: Non-focal, no tremors  SKIN: No rashes    LABS:                        13.0   6.98  )-----------( 425      ( 03 Jun 2021 07:00 )             39.5     06-03    138  |  101  |  15  ----------------------------<  132<H>  3.8   |  24  |  0.98    Ca    9.3      03 Jun 2021 07:00  Phos  3.6     06-03  Mg     1.6     06-03    TPro  7.8  /  Alb  4.1  /  TBili  0.6  /  DBili  x   /  AST  19  /  ALT  35<H>  /  AlkPhos  107  06-03              Culture - Blood (collected 02 Jun 2021 12:36)  Source: .Blood Blood-Peripheral  Preliminary Report (03 Jun 2021 13:02):    No growth to date.    Culture - Urine (collected 02 Jun 2021 08:27)  Source: .Urine Clean Catch (Midstream)  Preliminary Report (03 Jun 2021 11:55):    10,000 - 49,000 CFU/mL Pseudomonas aeruginosa    <10,000 CFU/ml Normal Urogenital yahir present        RADIOLOGY & ADDITIONAL TESTS:  No new imaging or tests    COORDINATION OF CARE:  Care Discussed with Consultants/Other Providers [Y/N]:  Prior or Outpatient Records Reviewed [Y/N]:   PROGRESS NOTE:   Authored by Joseluis Baez MD, PGY1 [facility] Pager [phone] [facility] pager [phone]    Patient is a 65y old  Female who presents with a chief complaint of recurrent L pyelonephritis (03 Jun 2021 15:45)      SUBJECTIVE / OVERNIGHT EVENTS: No acute events overnight. Patient feels "great" today. Best that she has felt in a while. Slept well overnight. Denies any back pain or dysuria currently. Denies fevers, chills, or abdominal pain.     REVIEW OF SYSTEMS:    CONSTITUTIONAL: No weakness, fevers or chills  EYES/ENT: No visual changes;  No vertigo or throat pain   NECK: No pain or stiffness  RESPIRATORY: No cough, wheezing, hemoptysis; No shortness of breath  CARDIOVASCULAR: No chest pain or palpitations  GASTROINTESTINAL: No abdominal or epigastric pain. No nausea, vomiting, or hematemesis; No diarrhea or constipation. No melena or hematochezia. No suprapubic tenderness or CVA tenderness.   GENITOURINARY: No dysuria, frequency or hematuria  NEUROLOGICAL: No numbness or weakness  SKIN: No itching, rashes    MEDICATIONS  (STANDING):  amLODIPine   Tablet 10 milliGRAM(s) Oral daily  aspirin enteric coated 81 milliGRAM(s) Oral daily  atorvastatin 20 milliGRAM(s) Oral at bedtime  dextrose 40% Gel 15 Gram(s) Oral once  dextrose 5%. 1000 milliLiter(s) (50 mL/Hr) IV Continuous <Continuous>  dextrose 5%. 1000 milliLiter(s) (100 mL/Hr) IV Continuous <Continuous>  dextrose 50% Injectable 25 Gram(s) IV Push once  dextrose 50% Injectable 12.5 Gram(s) IV Push once  dextrose 50% Injectable 25 Gram(s) IV Push once  enoxaparin Injectable 40 milliGRAM(s) SubCutaneous daily  glucagon  Injectable 1 milliGRAM(s) IntraMuscular once  hydrochlorothiazide 12.5 milliGRAM(s) Oral daily  insulin lispro (ADMELOG) corrective regimen sliding scale   SubCutaneous three times a day before meals  insulin lispro (ADMELOG) corrective regimen sliding scale   SubCutaneous at bedtime  lactated ringers. 1000 milliLiter(s) (100 mL/Hr) IV Continuous <Continuous>  melatonin 3 milliGRAM(s) Oral at bedtime  meropenem  IVPB 1000 milliGRAM(s) IV Intermittent every 8 hours  senna 1 Tablet(s) Oral once  senna 2 Tablet(s) Oral at bedtime    MEDICATIONS  (PRN):  acetaminophen   Tablet .. 650 milliGRAM(s) Oral every 6 hours PRN Temp greater or equal to 38C (100.4F), Mild Pain (1 - 3)  ketorolac   Injectable 15 milliGRAM(s) IV Push every 6 hours PRN moderate to severe pain  polyethylene glycol 3350 17 Gram(s) Oral daily PRN Constipation  simethicone 80 milliGRAM(s) Chew every 12 hours PRN Gas      CAPILLARY BLOOD GLUCOSE      POCT Blood Glucose.: 117 mg/dL (03 Jun 2021 21:27)  POCT Blood Glucose.: 118 mg/dL (03 Jun 2021 17:10)  POCT Blood Glucose.: 98 mg/dL (03 Jun 2021 12:22)  POCT Blood Glucose.: 134 mg/dL (03 Jun 2021 08:22)    I&O's Summary      PHYSICAL EXAM:  Vital Signs Last 24 Hrs  T(C): 36.4 (04 Jun 2021 06:35), Max: 36.5 (03 Jun 2021 13:30)  T(F): 97.6 (04 Jun 2021 06:35), Max: 97.7 (03 Jun 2021 13:30)  HR: 84 (04 Jun 2021 06:35) (84 - 97)  BP: 143/87 (04 Jun 2021 06:35) (143/85 - 146/81)  BP(mean): --  RR: 17 (04 Jun 2021 06:35) (17 - 18)  SpO2: 97% (04 Jun 2021 06:35) (97% - 100%)    CONSTITUTIONAL: NAD, well-developed  RESPIRATORY: Normal respiratory effort; lungs are clear to auscultation bilaterally  CARDIOVASCULAR: Regular rate and rhythm, normal S1 and S2, no murmur/rub/gallop; No lower extremity edema; Peripheral pulses are 2+ bilaterally  ABDOMEN: Nontender to palpation, normoactive bowel sounds, no rebound/guarding; No hepatosplenomegaly  MUSCLOSKELETAL: no clubbing or cyanosis of digits; no joint swelling or tenderness to palpation  PSYCH: A+O to person, place, and time; affect appropriate  NEURO: Non-focal, no tremors  SKIN: No rashes    LABS:                        13.0   6.98  )-----------( 425      ( 03 Jun 2021 07:00 )             39.5     06-03    138  |  101  |  15  ----------------------------<  132<H>  3.8   |  24  |  0.98    Ca    9.3      03 Jun 2021 07:00  Phos  3.6     06-03  Mg     1.6     06-03    TPro  7.8  /  Alb  4.1  /  TBili  0.6  /  DBili  x   /  AST  19  /  ALT  35<H>  /  AlkPhos  107  06-03              Culture - Blood (collected 02 Jun 2021 12:36)  Source: .Blood Blood-Peripheral  Preliminary Report (03 Jun 2021 13:02):    No growth to date.    Culture - Urine (collected 02 Jun 2021 08:27)  Source: .Urine Clean Catch (Midstream)  Preliminary Report (03 Jun 2021 11:55):    10,000 - 49,000 CFU/mL Pseudomonas aeruginosa    <10,000 CFU/ml Normal Urogenital yahir present        RADIOLOGY & ADDITIONAL TESTS:  No new imaging or tests    COORDINATION OF CARE:  Care Discussed with Consultants/Other Providers [Y/N]:  Prior or Outpatient Records Reviewed [Y/N]:

## 2021-06-07 LAB
CULTURE RESULTS: SIGNIFICANT CHANGE UP
SPECIMEN SOURCE: SIGNIFICANT CHANGE UP

## 2021-06-08 ENCOUNTER — APPOINTMENT (OUTPATIENT)
Dept: INFECTIOUS DISEASE | Facility: CLINIC | Age: 66
End: 2021-06-08
Payer: MEDICARE

## 2021-06-08 VITALS
HEART RATE: 102 BPM | TEMPERATURE: 97.3 F | SYSTOLIC BLOOD PRESSURE: 160 MMHG | HEIGHT: 66 IN | DIASTOLIC BLOOD PRESSURE: 89 MMHG | BODY MASS INDEX: 47.09 KG/M2 | WEIGHT: 293 LBS | OXYGEN SATURATION: 98 %

## 2021-06-08 LAB
CULTURE RESULTS: SIGNIFICANT CHANGE UP
CULTURE RESULTS: SIGNIFICANT CHANGE UP
SPECIMEN SOURCE: SIGNIFICANT CHANGE UP
SPECIMEN SOURCE: SIGNIFICANT CHANGE UP

## 2021-06-08 PROCEDURE — 99072 ADDL SUPL MATRL&STAF TM PHE: CPT

## 2021-06-08 PROCEDURE — 99214 OFFICE O/P EST MOD 30 MIN: CPT

## 2021-06-09 ENCOUNTER — APPOINTMENT (OUTPATIENT)
Dept: ULTRASOUND IMAGING | Facility: CLINIC | Age: 66
End: 2021-06-09
Payer: MEDICARE

## 2021-06-09 ENCOUNTER — OUTPATIENT (OUTPATIENT)
Dept: OUTPATIENT SERVICES | Facility: HOSPITAL | Age: 66
LOS: 1 days | End: 2021-06-09
Payer: MEDICARE

## 2021-06-09 DIAGNOSIS — Z00.8 ENCOUNTER FOR OTHER GENERAL EXAMINATION: ICD-10-CM

## 2021-06-09 DIAGNOSIS — N12 TUBULO-INTERSTITIAL NEPHRITIS, NOT SPECIFIED AS ACUTE OR CHRONIC: ICD-10-CM

## 2021-06-09 PROCEDURE — 76775 US EXAM ABDO BACK WALL LIM: CPT | Mod: 26

## 2021-06-09 PROCEDURE — 76775 US EXAM ABDO BACK WALL LIM: CPT

## 2021-06-10 LAB
ALBUMIN SERPL ELPH-MCNC: 4.9 G/DL
ALP BLD-CCNC: 132 U/L
ALT SERPL-CCNC: 43 U/L
ANION GAP SERPL CALC-SCNC: 19 MMOL/L
APPEARANCE: CLEAR
AST SERPL-CCNC: 26 U/L
BACTERIA UR CULT: NORMAL
BACTERIA: NEGATIVE
BASOPHILS # BLD AUTO: 0.08 K/UL
BASOPHILS NFR BLD AUTO: 0.8 %
BILIRUB SERPL-MCNC: 0.5 MG/DL
BILIRUBIN URINE: NEGATIVE
BLOOD URINE: NEGATIVE
BUN SERPL-MCNC: 18 MG/DL
CALCIUM SERPL-MCNC: 10.4 MG/DL
CHLORIDE SERPL-SCNC: 96 MMOL/L
CO2 SERPL-SCNC: 23 MMOL/L
COLOR: NORMAL
CREAT SERPL-MCNC: 1.07 MG/DL
EOSINOPHIL # BLD AUTO: 0.08 K/UL
EOSINOPHIL NFR BLD AUTO: 0.8 %
GLUCOSE QUALITATIVE U: NEGATIVE
GLUCOSE SERPL-MCNC: 124 MG/DL
HCT VFR BLD CALC: 41.4 %
HGB BLD-MCNC: 13.6 G/DL
HYALINE CASTS: 1 /LPF
IMM GRANULOCYTES NFR BLD AUTO: 0.3 %
KETONES URINE: NEGATIVE
LEUKOCYTE ESTERASE URINE: NEGATIVE
LYMPHOCYTES # BLD AUTO: 3.58 K/UL
LYMPHOCYTES NFR BLD AUTO: 37.9 %
MAN DIFF?: NORMAL
MCHC RBC-ENTMCNC: 27.2 PG
MCHC RBC-ENTMCNC: 32.9 GM/DL
MCV RBC AUTO: 82.8 FL
MICROSCOPIC-UA: NORMAL
MONOCYTES # BLD AUTO: 0.51 K/UL
MONOCYTES NFR BLD AUTO: 5.4 %
NEUTROPHILS # BLD AUTO: 5.16 K/UL
NEUTROPHILS NFR BLD AUTO: 54.8 %
NITRITE URINE: NEGATIVE
PH URINE: 6
PLATELET # BLD AUTO: 380 K/UL
POTASSIUM SERPL-SCNC: 4.5 MMOL/L
PROT SERPL-MCNC: 8.6 G/DL
PROTEIN URINE: NEGATIVE
RBC # BLD: 5 M/UL
RBC # FLD: 14.6 %
RED BLOOD CELLS URINE: 1 /HPF
SODIUM SERPL-SCNC: 138 MMOL/L
SPECIFIC GRAVITY URINE: 1.02
SQUAMOUS EPITHELIAL CELLS: 3 /HPF
UROBILINOGEN URINE: NORMAL
WBC # FLD AUTO: 9.44 K/UL
WHITE BLOOD CELLS URINE: 2 /HPF

## 2021-06-11 ENCOUNTER — NON-APPOINTMENT (OUTPATIENT)
Age: 66
End: 2021-06-11

## 2021-07-09 ENCOUNTER — RESULT REVIEW (OUTPATIENT)
Age: 66
End: 2021-07-09

## 2021-07-12 NOTE — HISTORY OF PRESENT ILLNESS
[FreeTextEntry1] : 65 year old female with DM2, HTN, Depression admitted from 5/20-5/28/21 for AMS from hypoglycemia and ESBL E. coli bacteremia from pyelonephritis completed 7 days of ertapenem, was home was having worsening bilateral flank pain and dysuria for two days readmitted 6/2/2021. No fevers. WBC WNL. CT A/P with bilateral pyelonephritis phelgmon in the upper pole of the right kidney, no drainable fluid collection. Urine cx now with Pseudomonas. She was started on meropenem with improvement, so changed to cipro 750 mg PO Q 12 hours to complete 6/12/2021. She presents for followup. She is completing cipro. She has no dysuria, but remains with right sided flank pain and chills. \par

## 2021-07-12 NOTE — PHYSICAL EXAM
[General Appearance - Alert] : alert [General Appearance - In No Acute Distress] : in no acute distress [General Appearance - Well Nourished] : well nourished [General Appearance - Well-Appearing] : healthy appearing [Sclera] : the sclera and conjunctiva were normal [PERRL With Normal Accommodation] : pupils were equal in size, round, reactive to light [Extraocular Movements] : extraocular movements were intact [Hearing Threshold Finger Rub Not Major] : hearing was normal [Examination Of The Oral Cavity] : the lips and gums were normal [Oropharynx] : the oropharynx was normal with no thrush [Neck Appearance] : the appearance of the neck was normal [Neck Cervical Mass (___cm)] : no neck mass was observed [Respiration, Rhythm And Depth] : normal respiratory rhythm and effort [Exaggerated Use Of Accessory Muscles For Inspiration] : no accessory muscle use [Auscultation Breath Sounds / Voice Sounds] : lungs were clear to auscultation bilaterally [Heart Rate And Rhythm] : heart rate was normal and rhythm regular [Heart Sounds] : normal S1 and S2 [Murmurs] : no murmurs [Edema] : there was no peripheral edema [Bowel Sounds] : normal bowel sounds [Abdomen Soft] : soft [Abdomen Tenderness] : non-tender [No Palpable Adenopathy] : no palpable adenopathy [Musculoskeletal - Swelling] : no joint swelling [Range of Motion to Joints] : range of motion to joints [Nail Clubbing] : no clubbing  or cyanosis of the fingernails [Motor Tone] : muscle strength and tone were normal [] : no rash [Skin Lesions] : no skin lesions [Sensation] : the sensory exam was normal to light touch and pinprick [Motor Exam] : the motor exam was normal [No Focal Deficits] : no focal deficits [Oriented To Time, Place, And Person] : oriented to person, place, and time [Affect] : the affect was normal

## 2021-07-12 NOTE — ASSESSMENT
[FreeTextEntry1] : 65 year old female with DM2, HTN, Depression admitted from 5/20-5/28/21 for AMS from hypoglycemia and ESBL E. coli bacteremia from pyelonephritis completed 7 days of ertapenem, was home was having worsening bilateral flank pain and dysuria for two days readmitted 6/2/2021. No fevers. WBC WNL. CT A/P with bilateral pyelonephritis phelgmon in the upper pole of the right kidney, no drainable fluid collection. Urine cx now with Pseudomonas\par \par #Recurrent bilateral pyelonephritis phlegmon in the upper pole of the right kidney\par -Continue cipro 750 mg PO Q 12 hours to complete 6/12/21.\par -Check CBC/ CMP/ UA/ urine culture\par -Check renal US given persistent flank pain - r/o abscess\par \par \par \par \par

## 2021-09-07 ENCOUNTER — APPOINTMENT (OUTPATIENT)
Dept: ENDOCRINOLOGY | Facility: CLINIC | Age: 66
End: 2021-09-07

## 2021-09-20 NOTE — ED PROVIDER NOTE - PMH
Return OB Visit    S: Ms. Garcia is feeling well today. She has no acute concerns. Denies leaking of fluid, vaginal bleeding, painful contractions. Notes fetal movements.    O:   /72   Pulse 118   Wt 60.5 kg (133 lb 6.4 oz)   LMP 2021 (Exact Date)   BMI 23.08 kg/m      Gen: Well-appearing, NAD  See OB Flowsheet    FH: 21 cm   bpm    Assessment:  Ms. Priscilla Garcia is a 29 year old yo  here for OB follow up. She is currently 23w2d. Her pregnancy is complicated by maternal smoking and marijuana use, chlamydia in first trimester.     Plan:  # Routine Prenatal Care  -- Datinw3d US CARMITA: 2022  -- PNLs:               O Neg, Ab screen neg              RPR nr              Hep B S Ag neg              Hep C neg              Rubella immune              HIV neg             +Chlamydia s/p treatment at first OB visit              - Gonorrhea              Pap: NIL, HPV negative     -- Genetic Screening: Discussed with patient, politely declined  -- Anatomy US:  58%ile, CL 4.2 cm anterior placenta   Limited facial, RVOT views: will follow up: ordered for next week  -- Immunizations: Plans Tdap at approximately 27 weeks gestation, Rhogam at 28 weeks  -- 3rd TM labs including CBC, RPR: Planned for after 27 weeks gestation  -- 1 hr GTT: Planned for next visit  -- GBS: Planned for 36 weeks  -- Postpartum Planning: To be discussed   -- Delivery Planning: No indication for early IOL at this time. To be discussed continually  -- Return to clinic in 4 weeks for OB follow up visit  -- Planning to do at next visit: 1 hr GTT, CBC, RPR, Tdap, follow up US     # Chlamydia in first trimester  -- s/p treatment, normal test of cure   -- Plan for test of reinfection at approx 32 weeks     # O negative blood type  -- Candidate for rhogam at 28 weeks     # Maternal smoking  -- Currently smokes 3-4 cigarettes daily  -- Smoking cessation discussed and recommended : noted increased risk IUGR,  oligohydramnios, preE     # Maternal marijuana use  -- Notes she uses for anxiety  -- Discussed alternatives for anxiety and increased risk of placental complications similiarly to smoking in pregnancy  -- Offered to potentially start selective serotonin reuptake inhibitor if she feels her anxiety is unable to be controlled with lifestyle measures, she politely declines          DM (diabetes mellitus)    HTN (hypertension)

## 2022-09-14 NOTE — DIETITIAN INITIAL EVALUATION ADULT. - ENTER FROM (G/KG)
Final Anesthesia Post-op Assessment    Patient: Jhon Elam  Procedure(s) Performed: EGD  Anesthesia type: MAC    Vitals Value Taken Time   Temp 36.1 °C (97 °F) 09/14/22 0903   Pulse 54 09/14/22 0903   Resp 16 09/14/22 0903   SpO2 95 % 09/14/22 0903   /71 09/14/22 0903         Patient Location: Phase II  Post-op Vital Signs:stable  Level of Consciousness: awake and alert  Respiratory Status: spontaneous ventilation  Cardiovascular stable  Hydration: euvolemic  Pain Management: adequately controlled  Handoff: Handoff to receiving clinician was performed and questions were answered  Vomiting: none  Nausea: None  Airway Patency:patent  Post-op Assessment: no complications, patient tolerated procedure well with no complications, dentition within defined limits, moving all extremities and No Corneal Abrasion      No complications documented.    1

## 2022-12-09 ENCOUNTER — OUTPATIENT (OUTPATIENT)
Dept: OUTPATIENT SERVICES | Facility: HOSPITAL | Age: 67
LOS: 1 days | Discharge: STILL ADMITTED OR ADMINISTRATIVE | End: 2022-12-09
Payer: MEDICARE

## 2022-12-09 PROCEDURE — 99214 OFFICE O/P EST MOD 30 MIN: CPT | Mod: 95

## 2022-12-11 PROCEDURE — 90839 PSYTX CRISIS INITIAL 60 MIN: CPT | Mod: 95

## 2022-12-12 DIAGNOSIS — F32.1 MAJOR DEPRESSIVE DISORDER, SINGLE EPISODE, MODERATE: ICD-10-CM

## 2022-12-13 ENCOUNTER — EMERGENCY (EMERGENCY)
Facility: HOSPITAL | Age: 67
LOS: 1 days | Discharge: HOME | End: 2022-12-13
Attending: EMERGENCY MEDICINE | Admitting: EMERGENCY MEDICINE

## 2022-12-13 VITALS
TEMPERATURE: 98 F | RESPIRATION RATE: 16 BRPM | OXYGEN SATURATION: 96 % | DIASTOLIC BLOOD PRESSURE: 76 MMHG | SYSTOLIC BLOOD PRESSURE: 141 MMHG | HEART RATE: 88 BPM

## 2022-12-13 VITALS
OXYGEN SATURATION: 99 % | DIASTOLIC BLOOD PRESSURE: 80 MMHG | HEART RATE: 102 BPM | TEMPERATURE: 98 F | SYSTOLIC BLOOD PRESSURE: 160 MMHG | RESPIRATION RATE: 16 BRPM

## 2022-12-13 LAB
ALBUMIN SERPL ELPH-MCNC: 4.3 G/DL — SIGNIFICANT CHANGE UP (ref 3.3–5)
ALP SERPL-CCNC: 135 U/L — HIGH (ref 40–120)
ALT FLD-CCNC: 19 U/L — SIGNIFICANT CHANGE UP (ref 4–33)
ANION GAP SERPL CALC-SCNC: 12 MMOL/L — SIGNIFICANT CHANGE UP (ref 7–14)
AST SERPL-CCNC: 19 U/L — SIGNIFICANT CHANGE UP (ref 4–32)
BASOPHILS # BLD AUTO: 0.04 K/UL — SIGNIFICANT CHANGE UP (ref 0–0.2)
BASOPHILS NFR BLD AUTO: 0.6 % — SIGNIFICANT CHANGE UP (ref 0–2)
BILIRUB SERPL-MCNC: 0.4 MG/DL — SIGNIFICANT CHANGE UP (ref 0.2–1.2)
BUN SERPL-MCNC: 28 MG/DL — HIGH (ref 7–23)
CALCIUM SERPL-MCNC: 9.8 MG/DL — SIGNIFICANT CHANGE UP (ref 8.4–10.5)
CHLORIDE SERPL-SCNC: 103 MMOL/L — SIGNIFICANT CHANGE UP (ref 98–107)
CK MB CFR SERPL CALC: 2.2 NG/ML — SIGNIFICANT CHANGE UP
CO2 SERPL-SCNC: 25 MMOL/L — SIGNIFICANT CHANGE UP (ref 22–31)
CREAT SERPL-MCNC: 1.33 MG/DL — HIGH (ref 0.5–1.3)
EGFR: 44 ML/MIN/1.73M2 — LOW
EOSINOPHIL # BLD AUTO: 0.14 K/UL — SIGNIFICANT CHANGE UP (ref 0–0.5)
EOSINOPHIL NFR BLD AUTO: 2.2 % — SIGNIFICANT CHANGE UP (ref 0–6)
GLUCOSE SERPL-MCNC: 100 MG/DL — HIGH (ref 70–99)
HCT VFR BLD CALC: 38.2 % — SIGNIFICANT CHANGE UP (ref 34.5–45)
HGB BLD-MCNC: 12.9 G/DL — SIGNIFICANT CHANGE UP (ref 11.5–15.5)
IANC: 3.05 K/UL — SIGNIFICANT CHANGE UP (ref 1.8–7.4)
IMM GRANULOCYTES NFR BLD AUTO: 0.2 % — SIGNIFICANT CHANGE UP (ref 0–0.9)
LYMPHOCYTES # BLD AUTO: 2.62 K/UL — SIGNIFICANT CHANGE UP (ref 1–3.3)
LYMPHOCYTES # BLD AUTO: 41.8 % — SIGNIFICANT CHANGE UP (ref 13–44)
MAGNESIUM SERPL-MCNC: 1.7 MG/DL — SIGNIFICANT CHANGE UP (ref 1.6–2.6)
MCHC RBC-ENTMCNC: 26.7 PG — LOW (ref 27–34)
MCHC RBC-ENTMCNC: 33.8 GM/DL — SIGNIFICANT CHANGE UP (ref 32–36)
MCV RBC AUTO: 78.9 FL — LOW (ref 80–100)
MONOCYTES # BLD AUTO: 0.41 K/UL — SIGNIFICANT CHANGE UP (ref 0–0.9)
MONOCYTES NFR BLD AUTO: 6.5 % — SIGNIFICANT CHANGE UP (ref 2–14)
NEUTROPHILS # BLD AUTO: 3.05 K/UL — SIGNIFICANT CHANGE UP (ref 1.8–7.4)
NEUTROPHILS NFR BLD AUTO: 48.7 % — SIGNIFICANT CHANGE UP (ref 43–77)
NRBC # BLD: 0 /100 WBCS — SIGNIFICANT CHANGE UP (ref 0–0)
NRBC # FLD: 0 K/UL — SIGNIFICANT CHANGE UP (ref 0–0)
PHOSPHATE SERPL-MCNC: 4.3 MG/DL — SIGNIFICANT CHANGE UP (ref 2.5–4.5)
PLATELET # BLD AUTO: 240 K/UL — SIGNIFICANT CHANGE UP (ref 150–400)
POTASSIUM SERPL-MCNC: 4.2 MMOL/L — SIGNIFICANT CHANGE UP (ref 3.5–5.3)
POTASSIUM SERPL-SCNC: 4.2 MMOL/L — SIGNIFICANT CHANGE UP (ref 3.5–5.3)
PROT SERPL-MCNC: 8 G/DL — SIGNIFICANT CHANGE UP (ref 6–8.3)
RBC # BLD: 4.84 M/UL — SIGNIFICANT CHANGE UP (ref 3.8–5.2)
RBC # FLD: 14.6 % — HIGH (ref 10.3–14.5)
SODIUM SERPL-SCNC: 140 MMOL/L — SIGNIFICANT CHANGE UP (ref 135–145)
TROPONIN T, HIGH SENSITIVITY RESULT: 10 NG/L — SIGNIFICANT CHANGE UP
WBC # BLD: 6.27 K/UL — SIGNIFICANT CHANGE UP (ref 3.8–10.5)
WBC # FLD AUTO: 6.27 K/UL — SIGNIFICANT CHANGE UP (ref 3.8–10.5)

## 2022-12-13 PROCEDURE — 99285 EMERGENCY DEPT VISIT HI MDM: CPT

## 2022-12-13 RX ORDER — ACETAMINOPHEN 500 MG
1000 TABLET ORAL ONCE
Refills: 0 | Status: COMPLETED | OUTPATIENT
Start: 2022-12-13 | End: 2022-12-13

## 2022-12-13 RX ADMIN — Medication 1000 MILLIGRAM(S): at 02:45

## 2022-12-13 NOTE — ED PROVIDER NOTE - ATTENDING CONTRIBUTION TO CARE
67-year-old female with past medical history of diabetes hypertension obstructive sleep apnea presenting with 1 day of elevated blood pressures at home.  She noted her blood pressure to be in the 1 80-1 90 range at home.  This was associated with a nonradiating left-sided midsternal chest pain.  It was a pressure type sensation.  It lasted only a few minutes and dissipated on its own.  There has been no further pain at this time.  There was some mild shortness of breath.  Was supposed to be taking amlodipine losartan and metoprolol for blood pressure but stopped taking the metoprolol last week because of side effects.    Gen: Well appearing in NAD  Head: NC/AT  Neck: trachea midline  Resp:  No distress  Ext: no deformities  Neuro:  A&O appears non focal  Skin:  Warm and dry as visualized  Psych:  Normal affect and mood     67-year-old female presenting with reported elevated blood pressure and a brief episode of chest pain and shortness of breath.  The blood pressure in the emergency department is in the 160 systolic range.  It is unlikely that this would have been a hypertensive emergency with a short duration of symptoms.  Blood pressure is also not significantly elevated to have been the case.  However based on her other risk factors will do EKG and troponin to evaluate for an ACS.  Does have primary care follow-up.  If work-up is negative can likely be discharged with rapid follow-up.

## 2022-12-13 NOTE — ED PROVIDER NOTE - PHYSICAL EXAMINATION
PHYSICAL EXAM:  GENERAL: NAD, lying in bed comfortably  HEAD:  Atraumatic, Normocephalic  EYES: EOMI, PERRLA, conjunctiva and sclera clear  ENT: Moist mucous membranes  NECK: Supple, No JVD  CHEST/LUNG: Clear to auscultation bilaterally; No rales, rhonchi, wheezing, or rubs. Unlabored respirations  HEART: Regular rate and rhythm; No murmurs, rubs, or gallops  ABDOMEN: Bowel sounds present; Soft, Nontender, Nondistended.   EXTREMITIES: 2+ Peripheral Pulses, brisk capillary refill. No clubbing, cyanosis, or edema  NERVOUS SYSTEM: Alert & Oriented X3, speech clear. No deficits   MSK: FROM all 4 extremities, full and equal strength  SKIN: No rashes or lesions

## 2022-12-13 NOTE — ED ADULT NURSE NOTE - OBJECTIVE STATEMENT
Break RN Note- Patient arrives to the ED for headache x2 days. A&Ox4. While in room, patient reports she started having left sided chest pain radiating down her left arm and slight left sided vision blurring. Dr. Carrillo made aware. Patient denies any nausea, vomiting, SOB, or dizziness. Patient breathing even and nonlabored. No acute distress. 20g IV placed to right arm. Labs sent as ordered. Patient medicated as ordered. Repeat EKG being obtained. Cardiac monitor in place- sinus rhythm. Safety maintained. Patient stable upon exiting the room.

## 2022-12-13 NOTE — ED PROVIDER NOTE - PATIENT PORTAL LINK FT
You can access the FollowMyHealth Patient Portal offered by [facility] by registering at the following website: [email or web address]. By joining [employer]’s FollowMyHealth portal, you will also be able to view your health information using other applications (apps) compatible with our system.

## 2022-12-13 NOTE — ED ADULT NURSE NOTE - CHIEF COMPLAINT QUOTE
Pt. is brought to [facility] ED by [facility](unit 84 mukul) from home. Pt. has HTN and H/A x 2 days. Pt. is non-compliant with metoprolol because of side effects. PMH: early dementia according to family, HTN, high cholesterol, DM ( at scene). c/o H/A. Daughter Luana: [phone]. BP was 192/108 at scene. NAD noted. EKG at triage. .0

## 2022-12-13 NOTE — ED PROVIDER NOTE - OBJECTIVE STATEMENT
Ms. Hamilton is a 66 y/o female with PMHx T2DM, HTN, KASIA, depression who presents with 1 day of elevated blood pressure with chest pain that prompted her to call EMS and come to the hospital. The patient reports non-radiating midsternal chest pain lasted a few minutes and then went away on its own. SBP was elevated to 192 in the field, decreased to 160 in the ED. Also reports associated shortness of breath. Takes amlodipine, losartan, and metoprolol for hypertension at home, but has not been taking metoprolol for last week because afraid of side effects.

## 2022-12-13 NOTE — ED ADULT TRIAGE NOTE - CHIEF COMPLAINT QUOTE
Pt. is brought to [facility] ED by [facility](unit 84 m[facility]ul) from home. Pt. has HTN and H/A x 2 days. Pt. is non-compliant with metoprolol because of side effects. PMH: early dementia according to family, HTN, high cholesterol, DM ( at scene). c/o H/A. Daughter Luana: [phone]. BP was 192/108 at scene. NAD noted. Pt. is brought to [facility] ED by [facility](unit 84 m[facility]ul) from home. Pt. has HTN and H/A x 2 days. Pt. is non-compliant with metoprolol because of side effects. PMH: early dementia according to family, HTN, high cholesterol, DM ( at scene). c/o H/A. Daughter Luana: [phone]. BP was 192/108 at scene. NAD noted. EKG at triage. Pt. is brought to [facility] ED by [facility](unit 84 m[facility]ul) from home. Pt. has HTN and H/A x 2 days. Pt. is non-compliant with metoprolol because of side effects. PMH: early dementia according to family, HTN, high cholesterol, DM ( at scene). c/o H/A. Daughter Luana: [phone]. BP was 192/108 at scene. NAD noted. EKG at triage. .0

## 2022-12-13 NOTE — ED PROVIDER NOTE - CLINICAL SUMMARY MEDICAL DECISION MAKING FREE TEXT BOX
Ms. Hamilton is a 66 y/o female with PMHx T2DM, HTN, KASIA, depression who presents with 1 day of elevated blood pressure with chest pain that prompted her to call EMS and come to the hospital, ischemic workup unremarkable. Given tylenol for headache with improvement. Found to have mild RY in setting of dehydration. Will be discharged with recommendation to hold home resume metoprolol. Also recommended to adequately hydrate at home and hold metformin at home until follow up with PCP to re-check labs.

## 2022-12-16 PROCEDURE — 99214 OFFICE O/P EST MOD 30 MIN: CPT | Mod: 95

## 2023-01-06 ENCOUNTER — OUTPATIENT (OUTPATIENT)
Dept: OUTPATIENT SERVICES | Facility: HOSPITAL | Age: 68
LOS: 1 days | Discharge: HOME | End: 2023-01-06
Payer: MEDICARE

## 2023-01-06 PROCEDURE — 90792 PSYCH DIAG EVAL W/MED SRVCS: CPT | Mod: 95

## 2023-01-11 ENCOUNTER — APPOINTMENT (OUTPATIENT)
Dept: INTERNAL MEDICINE | Facility: CLINIC | Age: 68
End: 2023-01-11
Payer: MEDICARE

## 2023-01-11 VITALS
HEIGHT: 66 IN | WEIGHT: 190 LBS | SYSTOLIC BLOOD PRESSURE: 130 MMHG | OXYGEN SATURATION: 99 % | DIASTOLIC BLOOD PRESSURE: 84 MMHG | BODY MASS INDEX: 30.53 KG/M2 | HEART RATE: 76 BPM | TEMPERATURE: 97.1 F

## 2023-01-11 DIAGNOSIS — F32.A DEPRESSION, UNSPECIFIED: ICD-10-CM

## 2023-01-11 DIAGNOSIS — E55.9 VITAMIN D DEFICIENCY, UNSPECIFIED: ICD-10-CM

## 2023-01-11 DIAGNOSIS — G47.00 INSOMNIA, UNSPECIFIED: ICD-10-CM

## 2023-01-11 DIAGNOSIS — Z78.9 OTHER SPECIFIED HEALTH STATUS: ICD-10-CM

## 2023-01-11 DIAGNOSIS — Z87.448 PERSONAL HISTORY OF OTHER DISEASES OF URINARY SYSTEM: ICD-10-CM

## 2023-01-11 DIAGNOSIS — Z63.5 DISRUPTION OF FAMILY BY SEPARATION AND DIVORCE: ICD-10-CM

## 2023-01-11 DIAGNOSIS — Z87.828 PERSONAL HISTORY OF OTHER (HEALED) PHYSICAL INJURY AND TRAUMA: ICD-10-CM

## 2023-01-11 DIAGNOSIS — D58.2 OTHER HEMOGLOBINOPATHIES: ICD-10-CM

## 2023-01-11 DIAGNOSIS — G47.9 SLEEP DISORDER, UNSPECIFIED: ICD-10-CM

## 2023-01-11 PROCEDURE — G0438: CPT

## 2023-01-11 SDOH — SOCIAL STABILITY - SOCIAL INSECURITY: DISRUPTION OF FAMILY BY SEPARATION AND DIVORCE: Z63.5

## 2023-01-12 PROBLEM — Z78.9 RARELY CONSUMES ALCOHOL: Status: ACTIVE | Noted: 2020-01-21

## 2023-01-12 PROBLEM — G47.00 INSOMNIA: Status: ACTIVE | Noted: 2023-01-12

## 2023-01-12 PROBLEM — Z87.448 HISTORY OF PYELONEPHRITIS: Status: RESOLVED | Noted: 2021-06-08 | Resolved: 2023-01-12

## 2023-01-12 PROBLEM — Z87.828 HISTORY OF MOTOR VEHICLE ACCIDENT: Status: RESOLVED | Noted: 2023-01-12 | Resolved: 2023-01-12

## 2023-01-12 PROBLEM — D58.2 ELEVATED HEMOGLOBIN: Status: RESOLVED | Noted: 2020-01-21 | Resolved: 2023-01-12

## 2023-01-12 PROBLEM — F32.A DEPRESSION: Status: ACTIVE | Noted: 2023-01-12

## 2023-01-12 RX ORDER — VIT C/ZN GLUC/HERBAL NO.325 90 MG-15MG
LOZENGE MUCOUS MEMBRANE DAILY
Refills: 0 | Status: ACTIVE | COMMUNITY

## 2023-01-12 RX ORDER — ENALAPRIL MALEATE 20 MG/1
20 TABLET ORAL TWICE DAILY
Qty: 60 | Refills: 3 | Status: COMPLETED | COMMUNITY
End: 2023-01-12

## 2023-01-12 RX ORDER — DOXYLAMINE SUCCINATE 25 MG
25 TABLET ORAL AT BEDTIME
Refills: 0 | Status: ACTIVE | COMMUNITY

## 2023-01-12 RX ORDER — MIRTAZAPINE 15 MG/1
15 TABLET, FILM COATED ORAL
Qty: 30 | Refills: 0 | Status: ACTIVE | COMMUNITY
Start: 1900-01-01 | End: 1900-01-01

## 2023-01-12 RX ORDER — ASPIRIN 81 MG
81 TABLET, DELAYED RELEASE (ENTERIC COATED) ORAL DAILY
Refills: 0 | Status: ACTIVE | COMMUNITY

## 2023-01-12 RX ORDER — TRAZODONE HYDROCHLORIDE 100 MG/1
100 TABLET ORAL
Refills: 0 | Status: COMPLETED | COMMUNITY
End: 2023-01-12

## 2023-01-12 NOTE — PHYSICAL EXAM
[No Acute Distress] : no acute distress [Well Nourished] : well nourished [Well Developed] : well developed [Well-Appearing] : well-appearing [Normal Voice/Communication] : normal voice/communication [Normal Sclera/Conjunctiva] : normal sclera/conjunctiva [PERRL] : pupils equal round and reactive to light [EOMI] : extraocular movements intact [Normal Outer Ear/Nose] : the outer ears and nose were normal in appearance [Normal Oropharynx] : the oropharynx was normal [Normal TMs] : both tympanic membranes were normal [No JVD] : no jugular venous distention [No Lymphadenopathy] : no lymphadenopathy [Supple] : supple [Thyroid Normal, No Nodules] : the thyroid was normal and there were no nodules present [No Respiratory Distress] : no respiratory distress  [No Accessory Muscle Use] : no accessory muscle use [Clear to Auscultation] : lungs were clear to auscultation bilaterally [Normal Rate] : normal rate  [Regular Rhythm] : with a regular rhythm [Normal S1, S2] : normal S1 and S2 [No Murmur] : no murmur heard [No Carotid Bruits] : no carotid bruits [No Abdominal Bruit] : a ~M bruit was not heard ~T in the abdomen [No Varicosities] : no varicosities [Pedal Pulses Present] : the pedal pulses are present [No Edema] : there was no peripheral edema [No Palpable Aorta] : no palpable aorta [No Extremity Clubbing/Cyanosis] : no extremity clubbing/cyanosis [Normal Appearance] : normal in appearance [No Nipple Discharge] : no nipple discharge [No Axillary Lymphadenopathy] : no axillary lymphadenopathy [Soft] : abdomen soft [Non Tender] : non-tender [Non-distended] : non-distended [No Masses] : no abdominal mass palpated [No HSM] : no HSM [Normal Bowel Sounds] : normal bowel sounds [No Hernias] : no hernias [Normal Posterior Cervical Nodes] : no posterior cervical lymphadenopathy [Normal Anterior Cervical Nodes] : no anterior cervical lymphadenopathy [No CVA Tenderness] : no CVA  tenderness [No Spinal Tenderness] : no spinal tenderness [No Joint Swelling] : no joint swelling [Grossly Normal Strength/Tone] : grossly normal strength/tone [No Rash] : no rash [Coordination Grossly Intact] : coordination grossly intact [Normal Gait] : normal gait [Deep Tendon Reflexes (DTR)] : deep tendon reflexes were 2+ and symmetric [Normal Affect] : the affect was normal [Normal Insight/Judgement] : insight and judgment were intact [Comprehensive Foot Exam Normal] : Right and left foot were examined and both feet are normal. No ulcers in either foot. Toes are normal and with full ROM.  Normal tactile sensation with monofilament testing throughout both feet [de-identified] : (breast exam chaperone Philippe Cabrera) [de-identified] : 4/5 strength RLE.

## 2023-01-12 NOTE — ASSESSMENT
[FreeTextEntry1] : (1) HCM - discussed diet, exercise, weight maintenance.  Labs drawn in office as below, including HIV/Hepatitis C screening.  Patient reports an influenza vaccination this season.  She reports 3 doses of a COVID-19 vaccine, and will bring her COVID-19 card to her next visit.  She reports 2 doses of Shingrix and a Pneumovax, and will attempt to obtain dates.  She is not sure of Tdap, and will consider giving this in a future visit.  Referral given for Gyn.  Patient to continue screening mammogram every 1-2 years, and bone density every 2 years.  She reports a normal screening colonoscopy from 2020 (Dr. Yin), with 5 year follow-up recommended.  Script given for audiology evaluation.  Continue eye exams as directed.  Will discuss Advance Directives in a future visit.\par \par (2) Type 2 DM - patient seeing Endocrine, and is on metformin, and an additional medication which she does not recall.  She will have records forwarded.  She is on an statin and an ARB.  She has seen an ophthalmologist and was told of mild retinopathy.  She will follow-up as directed.  Referral given for diabetic educator.\par \par (3) HTN - BP adequately controlled, continue present regimen.  Renin, aldosterone added to labs (she had a script from a nephrologist ordering this).\par \par (4) Ortho follow-up as directed.  Patient given a letter to use [employer].  She was asked to have ortho records forwarded.\par \par (5) Sleep referral given (patient says she has been asked to have this done by prior MDs).\par \par Patient to follow-up in 1 week.

## 2023-01-12 NOTE — HEALTH RISK ASSESSMENT
[Yes] : Yes [Monthly or less (1 pt)] : Monthly or less (1 point) [No] : In the past 12 months have you used drugs other than those required for medical reasons? No [No falls in past year] : Patient reported no falls in the past year [0] : 1) Little interest or pleasure doing things: Not at all (0) [1] : 2) Feeling down, depressed, or hopeless for several days (1) [PHQ-2 Negative - No further assessment needed] : PHQ-2 Negative - No further assessment needed [Patient reported mammogram was normal] : Patient reported mammogram was normal [Patient reported bone density results were normal] : Patient reported bone density results were normal [Patient reported colonoscopy was normal] : Patient reported colonoscopy was normal [HIV Test offered] : HIV Test offered [Hepatitis C test offered] : Hepatitis C test offered [With Family] : lives with family [Smoke Detector] : smoke detector [Carbon Monoxide Detector] : carbon monoxide detector [Seat Belt] :  uses seat belt [Sunscreen] : uses sunscreen [Never] : Never [Fair] :  ~his/her~ mood as fair [[id number]] : 1 [Change in mental status noted] : No change in mental status noted [Feels Safe at Home] : Feels safe at home [Reports changes in hearing] : Reports no changes in hearing [Reports changes in dental health] : Reports no changes in dental health [MammogramDate] : 10/22 [MammogramComments] : She has a marker on the R breast.  [facility].  [phone]. [PapSmearComments] : Requests Gyn in [place]. [BoneDensityDate] : 2022 [ColonoscopyDate] : 2020 [ColonoscopyComments] : Dr. Yin.  5 year follow-up. [de-identified] : Staying with daughter. [de-identified] : Needs help getting dressed, bathing, preparing food.  Needs help with transportation. [de-identified] : Was told of some DM retinopathy.  Seen in 2022. [de-identified] : Going regularly. [FreeTextEntry4] : Will discuss in a future visit.
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
## 2023-01-13 LAB
25(OH)D3 SERPL-MCNC: 36.3 NG/ML
ALBUMIN SERPL ELPH-MCNC: 5.1 G/DL
ALDOSTERONE SERUM: 8.2 NG/DL
ALP BLD-CCNC: 157 U/L
ALT SERPL-CCNC: 20 U/L
ANION GAP SERPL CALC-SCNC: 13 MMOL/L
APPEARANCE: CLEAR
AST SERPL-CCNC: 18 U/L
BACTERIA: NEGATIVE
BASOPHILS # BLD AUTO: 0.06 K/UL
BASOPHILS NFR BLD AUTO: 0.7 %
BILIRUB SERPL-MCNC: 0.5 MG/DL
BILIRUBIN URINE: NEGATIVE
BLOOD URINE: NEGATIVE
BUN SERPL-MCNC: 20 MG/DL
CALCIUM SERPL-MCNC: 10.2 MG/DL
CHLORIDE SERPL-SCNC: 101 MMOL/L
CHOLEST SERPL-MCNC: 166 MG/DL
CO2 SERPL-SCNC: 26 MMOL/L
COLOR: NORMAL
CREAT SERPL-MCNC: 1.05 MG/DL
CREAT SPEC-SCNC: 156 MG/DL
EGFR: 58 ML/MIN/1.73M2
EOSINOPHIL # BLD AUTO: 0.05 K/UL
EOSINOPHIL NFR BLD AUTO: 0.6 %
ESTIMATED AVERAGE GLUCOSE: 137 MG/DL
GLUCOSE QUALITATIVE U: NEGATIVE
GLUCOSE SERPL-MCNC: 97 MG/DL
HBA1C MFR BLD HPLC: 6.4 %
HCT VFR BLD CALC: 42.1 %
HCV AB SER QL: NONREACTIVE
HCV S/CO RATIO: 0.21 S/CO
HDLC SERPL-MCNC: 67 MG/DL
HGB BLD-MCNC: 13.4 G/DL
HIV1+2 AB SPEC QL IA.RAPID: NONREACTIVE
HYALINE CASTS: 0 /LPF
IMM GRANULOCYTES NFR BLD AUTO: 0.2 %
KETONES URINE: NEGATIVE
LDLC SERPL CALC-MCNC: 85 MG/DL
LEUKOCYTE ESTERASE URINE: NEGATIVE
LYMPHOCYTES # BLD AUTO: 2.45 K/UL
LYMPHOCYTES NFR BLD AUTO: 29.4 %
MAN DIFF?: NORMAL
MCHC RBC-ENTMCNC: 27.5 PG
MCHC RBC-ENTMCNC: 31.8 GM/DL
MCV RBC AUTO: 86.3 FL
MICROALBUMIN 24H UR DL<=1MG/L-MCNC: <1.2 MG/DL
MICROALBUMIN/CREAT 24H UR-RTO: NORMAL MG/G
MICROSCOPIC-UA: NORMAL
MONOCYTES # BLD AUTO: 0.31 K/UL
MONOCYTES NFR BLD AUTO: 3.7 %
NEUTROPHILS # BLD AUTO: 5.45 K/UL
NEUTROPHILS NFR BLD AUTO: 65.4 %
NITRITE URINE: NEGATIVE
NONHDLC SERPL-MCNC: 99 MG/DL
PH URINE: 5.5
PLATELET # BLD AUTO: 267 K/UL
POTASSIUM SERPL-SCNC: 4.4 MMOL/L
PROT SERPL-MCNC: 8.7 G/DL
PROTEIN URINE: NORMAL
RBC # BLD: 4.88 M/UL
RBC # FLD: 16.1 %
RED BLOOD CELLS URINE: 2 /HPF
RENIN PLASMA: 6.7 PG/ML
SODIUM SERPL-SCNC: 140 MMOL/L
SPECIFIC GRAVITY URINE: 1.02
SQUAMOUS EPITHELIAL CELLS: 1 /HPF
T3 SERPL-MCNC: 123 NG/DL
T3FREE SERPL-MCNC: 3.06 PG/ML
T4 FREE SERPL-MCNC: 1.3 NG/DL
TRIGL SERPL-MCNC: 66 MG/DL
TSH SERPL-ACNC: 1.12 UIU/ML
UROBILINOGEN URINE: NORMAL
VIT B12 SERPL-MCNC: 971 PG/ML
WBC # FLD AUTO: 8.34 K/UL
WHITE BLOOD CELLS URINE: 2 /HPF

## 2023-01-24 ENCOUNTER — APPOINTMENT (OUTPATIENT)
Dept: INTERNAL MEDICINE | Facility: CLINIC | Age: 68
End: 2023-01-24
Payer: MEDICARE

## 2023-01-24 VITALS
SYSTOLIC BLOOD PRESSURE: 142 MMHG | HEIGHT: 66 IN | HEART RATE: 80 BPM | DIASTOLIC BLOOD PRESSURE: 80 MMHG | BODY MASS INDEX: 30.22 KG/M2 | TEMPERATURE: 95 F | WEIGHT: 188 LBS | OXYGEN SATURATION: 98 %

## 2023-01-24 DIAGNOSIS — M79.606 PAIN IN LEG, UNSPECIFIED: ICD-10-CM

## 2023-01-24 DIAGNOSIS — R22.1 LOCALIZED SWELLING, MASS AND LUMP, NECK: ICD-10-CM

## 2023-01-24 PROCEDURE — 99214 OFFICE O/P EST MOD 30 MIN: CPT | Mod: 25

## 2023-01-24 RX ORDER — METFORMIN HYDROCHLORIDE 500 MG/1
500 TABLET, COATED ORAL TWICE DAILY
Qty: 60 | Refills: 3 | Status: COMPLETED | COMMUNITY
End: 2023-01-24

## 2023-01-24 RX ORDER — LINAGLIPTIN 5 MG/1
5 TABLET, FILM COATED ORAL DAILY
Qty: 90 | Refills: 3 | Status: ACTIVE | COMMUNITY

## 2023-01-24 NOTE — ASSESSMENT
[FreeTextEntry1] : (1) HTN - patient's BP remains elevated above goal.  Will raise the metoprolol ER to 100mg daily.  Patient to call if her pulse drops below 50, or SBP <100mm Hg.  She will continue to monitor her BP at least several days a week.  Follow-up in 3-4 weeks.\par \par (2) Type 2 DM - patient on Tradjenta 5mg daily, and followed by Endocrine.\par \par (3) Neck swelling - patient has had a thyroid ultrasound for thyroid nodules, but requests an US for a swelling in the supraclavicular region.  Script given today.\par \par (4) Ortho referral given for leg pains (she says she has records which she will bring to the ortho visit).

## 2023-01-24 NOTE — HISTORY OF PRESENT ILLNESS
[de-identified] : Patient for follow-up from recent new patient visit.  Patient has Type 2 DM, and is seeing Endocrine.  She brought in her medication bottle, and is on Tradjenta 5mg daily.  She says she has been off the metformin since Nov 2022.  A1c in recent physical was 6.4%.\par \par Patient has had recurrent UTI's, although she is currently asymptomatic.  Recent U/A normal.\par \par Patient has been seen by Heme/Onc at [facility] for polycythemia, and notes she is seeing a different Hematologist elsewhere.\par \par Patient requests an orthopedic referral for leg pains from a prior injury.  She says she has records which she will bring to the orthopedic visit.

## 2023-01-27 DIAGNOSIS — F43.11 POST-TRAUMATIC STRESS DISORDER, ACUTE: ICD-10-CM

## 2023-01-27 DIAGNOSIS — F33.2 MAJOR DEPRESSIVE DISORDER, RECURRENT SEVERE WITHOUT PSYCHOTIC FEATURES: ICD-10-CM

## 2023-02-03 ENCOUNTER — APPOINTMENT (OUTPATIENT)
Dept: OTOLARYNGOLOGY | Facility: CLINIC | Age: 68
End: 2023-02-03

## 2023-02-16 ENCOUNTER — APPOINTMENT (OUTPATIENT)
Dept: ORTHOPEDIC SURGERY | Facility: CLINIC | Age: 68
End: 2023-02-16
Payer: MEDICARE

## 2023-02-16 VITALS
BODY MASS INDEX: 30.22 KG/M2 | WEIGHT: 188 LBS | HEIGHT: 66 IN | OXYGEN SATURATION: 98 % | SYSTOLIC BLOOD PRESSURE: 142 MMHG | HEART RATE: 80 BPM | DIASTOLIC BLOOD PRESSURE: 80 MMHG

## 2023-02-16 PROCEDURE — 99204 OFFICE O/P NEW MOD 45 MIN: CPT

## 2023-02-16 PROCEDURE — 73502 X-RAY EXAM HIP UNI 2-3 VIEWS: CPT | Mod: RT

## 2023-02-16 NOTE — PHYSICAL EXAM
[de-identified] : Patient is well nourished, well-developed, in no acute distress, with appropriate mood and affect. The patient is oriented to time, place, and person. Respirations are even and unlabored. Gait evaluation reveals a limp. There is no inguinal adenopathy. Examination of the contralateral hip shows normal range of motion, strength, no tenderness, and intact skin. The right limb is well-perfused, shows a grossly normal motor and sensory examination. Examination of the right hip shows a well-healed posterior lateral surgical scar. Hip motion is reduced and causes pain. FADIR is positive and ESTELLA is positive. Stinchfield test is positive.  Hip motion is quite limited.  Leg lengths are approximately equal. Both hips are stable and muscle strength is normal. Pedal pulses are palpable. [de-identified] : AP pelvis, AP hip, and lateral x-rays of the right hip were ordered and obtained in the office and demonstrate satisfactory position and alignment of the components are present.  The acetabulum is somewhat protrusio.  No old radiographs are available for comparison.  She has grade 3 heterotopic ossification.

## 2023-02-16 NOTE — REASON FOR VISIT
[Initial Visit] : an initial visit for [Artificial Hip Joint] : an artificial hip joint [Hip Pain] : hip pain

## 2023-02-16 NOTE — DISCUSSION/SUMMARY
[de-identified] : This patient has right hip heterotopic ossification around the right total hip arthroplasty performed less than 1 year ago.  I think that the biggest concern would be for prosthetic joint infection at this time.  She is interested in going back to her initial surgeon to have this worked up.  She will need an ESR and CRP and probably hip aspiration to rule out infection.  Otherwise she could consider having the heterotopic ossification excised which she likely would need to wait until she is at least 1 year from this performing because the risk of recurrence.  Several questions sought and answered.

## 2023-02-16 NOTE — HISTORY OF PRESENT ILLNESS
[de-identified] : This is very nice 67-year-old female experiencing right hip pain, which is moderate in intensity.  Her biggest complaint is limited motion of the right hip.  She underwent a right total of arthroplasty with a posterior approach at [place].  This was done May 2, 2022.  She never got better from the surgery.  She was told that she had developed heterotopic bone around this.  She has tried physical therapy.  This substantially limits activities of daily living. Walking tolerance is reduced.  No fevers or chills.

## 2023-02-17 ENCOUNTER — APPOINTMENT (OUTPATIENT)
Dept: OTOLARYNGOLOGY | Facility: CLINIC | Age: 68
End: 2023-02-17
Payer: MEDICARE

## 2023-02-17 VITALS — DIASTOLIC BLOOD PRESSURE: 86 MMHG | SYSTOLIC BLOOD PRESSURE: 135 MMHG | HEART RATE: 75 BPM

## 2023-02-17 DIAGNOSIS — H92.01 OTALGIA, RIGHT EAR: ICD-10-CM

## 2023-02-17 DIAGNOSIS — H93.293 OTHER ABNORMAL AUDITORY PERCEPTIONS, BILATERAL: ICD-10-CM

## 2023-02-17 DIAGNOSIS — M26.609 UNSPECIFIED TEMPOROMANDIBULAR JOINT DISORDER: ICD-10-CM

## 2023-02-17 DIAGNOSIS — H93.13 TINNITUS, BILATERAL: ICD-10-CM

## 2023-02-17 PROCEDURE — 92625 TINNITUS ASSESSMENT: CPT

## 2023-02-17 PROCEDURE — 99204 OFFICE O/P NEW MOD 45 MIN: CPT | Mod: 25

## 2023-02-17 PROCEDURE — 92557 COMPREHENSIVE HEARING TEST: CPT

## 2023-02-17 PROCEDURE — 92567 TYMPANOMETRY: CPT

## 2023-02-17 NOTE — ASSESSMENT
[FreeTextEntry1] : 67 year F  with perceived hearing loss, left cerumen impaction, bilateral tinnitus, right otalgia 2/2 TMJ. Patient tolerated cerumen removal without complaints. Physical exam shows bilateral  ears normal EAC/TM. Tenderness to palpation on right>Left TMJ\par \par Personally reviewed Audiogram shows AU Hearing Essential -8k hz. AU Tymp A. Tinnitus Match 20dB @ 8k hz\par \par Recommend:\par Cerumen Impaction\par -Discussed not using q-tips or instruments to remove wax\par -Olive or mineral oil 1x times every 2 week to keep ear canal lubricated. Discussed that the ear is a self cleaning structure and just allow it clean itself. If wax builds up can try debrox. Once it gets impacted recommend return to get it cleaned out.\par \par TMJ\par - Recommended applying moist heat or cold packs on areas of acute pain\par - Trial of Soft foods diet for 2 weeks during acute episodes\par - Trial of (NSAIDs), such as aspirin/ibuprofen if patient tolerates\par - Follow up Dentist for for .\par - Avoid extreme jaw movements.\par \par Bilateral  Tinnitus \par -Discussed that Hearing Aids may help with relieving some of the tinnitus. Tinnitus usually follows the same pattern of hearing loss and can be attributed to phantom sounds in the brain filling in for the loss of hearing in those particular frequencies\par -Discussed that Tinnitus usually can be attributed to phantom sounds in the brain filling in for sounds. If the tinnitus is brief only last for a few seconds with no loss of hearing associated. It is usually physiological and can be management conservatively \par -Discussed notched therapy, masking therapy, cognitive behavioral therapy, factors that may influence tinnitus, and discussed limited benefit of tinnitus supplements.\par -Tinnitus Hand Out Given\par -Patient states will try white noise machine\par \par -Return to clinic as needed or sooner if new/worsen symptoms present\par \par \par

## 2023-02-17 NOTE — PHYSICAL EXAM
[de-identified] : Tenderness to palpation on right>Left [FreeTextEntry9] : cerumen impaction. removed [Normal] : mucosa is normal [Midline] : trachea located in midline position

## 2023-02-17 NOTE — HISTORY OF PRESENT ILLNESS
[de-identified] : 67 year old female presents for right ear issues\par States she has had Right intermittent otalgia for a over a year\par Also hears intermittent bilateral ringing- also for over a year\par Unsure if it is affecting her hearing - people often tell her she speaks very loudly\par Reports use of qtips\par Denies otorrhea, ear infections, dizziness, vertigo, headaches related to hearing.

## 2023-02-17 NOTE — DATA REVIEWED
[de-identified] : An audiogram was ordered and performed including pure tones, tympanometry and speech testing for the patients complaint of hearing loss\par I have independently reviewed the patient's audiogram from today and my findings include \par AU Hearing Essential -8k hz. AU Tymp A. Tinnitus Match 20dB @ 8k hz

## 2023-02-24 ENCOUNTER — APPOINTMENT (OUTPATIENT)
Dept: ORTHOPEDIC SURGERY | Facility: CLINIC | Age: 68
End: 2023-02-24
Payer: MEDICARE

## 2023-02-24 VITALS
WEIGHT: 192 LBS | DIASTOLIC BLOOD PRESSURE: 92 MMHG | BODY MASS INDEX: 30.86 KG/M2 | OXYGEN SATURATION: 97 % | TEMPERATURE: 97.3 F | SYSTOLIC BLOOD PRESSURE: 171 MMHG | HEART RATE: 70 BPM | HEIGHT: 66 IN

## 2023-02-24 PROCEDURE — 99213 OFFICE O/P EST LOW 20 MIN: CPT

## 2023-02-24 PROCEDURE — 72100 X-RAY EXAM L-S SPINE 2/3 VWS: CPT

## 2023-02-28 LAB
BASOPHILS # BLD AUTO: 0.06 K/UL
BASOPHILS NFR BLD AUTO: 0.9 %
CRP SERPL-MCNC: 8 MG/L
EOSINOPHIL # BLD AUTO: 0.2 K/UL
EOSINOPHIL NFR BLD AUTO: 3 %
ERYTHROCYTE [SEDIMENTATION RATE] IN BLOOD BY WESTERGREN METHOD: 37 MM/HR
HCT VFR BLD CALC: 37.9 %
HGB BLD-MCNC: 12.7 G/DL
IMM GRANULOCYTES NFR BLD AUTO: 0.1 %
LYMPHOCYTES # BLD AUTO: 2.09 K/UL
LYMPHOCYTES NFR BLD AUTO: 31.1 %
MAN DIFF?: NORMAL
MCHC RBC-ENTMCNC: 27.8 PG
MCHC RBC-ENTMCNC: 33.5 GM/DL
MCV RBC AUTO: 82.9 FL
MONOCYTES # BLD AUTO: 0.33 K/UL
MONOCYTES NFR BLD AUTO: 4.9 %
NEUTROPHILS # BLD AUTO: 4.04 K/UL
NEUTROPHILS NFR BLD AUTO: 60 %
PLATELET # BLD AUTO: 223 K/UL
RBC # BLD: 4.57 M/UL
RBC # FLD: 15.4 %
WBC # FLD AUTO: 6.73 K/UL
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
## 2023-02-28 NOTE — REVIEW OF SYSTEMS
[Joint Pain] : joint pain [Negative] : Neurological [Joint Stiffness] : no joint stiffness [Joint Swelling] : no joint swelling [Fever] : no fever [Chills] : no chills [FreeTextEntry5] : HTN [FreeTextEntry8] : History of Elevated Creatinine (Last: 1.05) [de-identified] : Diabetes (Last A1C: 6.4)

## 2023-02-28 NOTE — PHYSICAL EXAM
[de-identified] : Constitutional: 67 year old female, alert and oriented, cooperative, in no acute distress.\par \par HEENT \par NC/AT.  Appearance: symmetric\par \par Neck/Back\par Straight without deformity or instability.  Good ROM.\par \par Chest/Respiratory \par Respiratory effort: no intercostal retractions or use of accessory muscles. Nonlabored Breathing\par \par Mental Status: \par Judgment, insight: intact\par Orientation: oriented to time, place, and person\par \par Neurological:\par Sensory and Motor are grossly intact throughout\par \par Right Hip Exam:\par Inspection/Appearance:\par      Posterior hip Incision well healed, no erythema or drainage\par \par Tenderness: \par 	Sacroiliac: Positive \par 	Greater trochanter: Negative \par                 ESTELLA Test: Positive\par                 FADIR Test: Positive\par                 Anterior Thigh: Positive, tenderness\par \par Range of Motion: Pain with hip ROM\par                 Extension - 0 \par 	Flexion - 80 \par 	IR - 10 \par 	ER - 30 \par 	Abd - 30\par 	Add - 20 \par \par Stability: Normal without instability\par \par Neurologic Exam\par     Motor intact including 5/5 Extensor Hallucis Longus, 5/5 Flexor Hallucis Longus, 5/5 Tibialis Anterior and 5/5 Gastrocnemius\par     Sensation Intact to Light Touch including Saphenous, Sural, Superficial Peroneal, Deep Peroneal, Tibial nerve distributions\par \par Vascular Exam\par     Foot is warm and well perfused with 2+ Dorsalis Pedis Pulse\par \par No lumbar paraspinal muscle tenderness.  [de-identified] : XRay:  XRays of the Pelvis (1 View) and Right Hip (2 Views) taken on 2/16/23. XRays demonstrate a Right Total Hip Arthroplasty.  There is some acetabular profunda. There are 3 acetabular screws in place.  There is heterotopic ossification around the hip capsule and proximal femur/femoral stem.  (my personal interpretation)\par \par XRay:  XRays of the Lumbar Spine (2 Views) taken in the office today and discussed with the patient. XRays demonstrate no obvious fracture or dislocation.  There is a small spondylolisthesis on L4-5.  (my personal interpretation).

## 2023-03-03 ENCOUNTER — APPOINTMENT (OUTPATIENT)
Dept: RADIOLOGY | Facility: CLINIC | Age: 68
End: 2023-03-03
Payer: MEDICARE

## 2023-03-03 ENCOUNTER — OUTPATIENT (OUTPATIENT)
Dept: OUTPATIENT SERVICES | Facility: HOSPITAL | Age: 68
LOS: 1 days | End: 2023-03-03
Payer: MEDICARE

## 2023-03-03 ENCOUNTER — RESULT REVIEW (OUTPATIENT)
Age: 68
End: 2023-03-03

## 2023-03-03 DIAGNOSIS — Z00.8 ENCOUNTER FOR OTHER GENERAL EXAMINATION: ICD-10-CM

## 2023-03-03 DIAGNOSIS — T84.84XA PAIN DUE TO INTERNAL ORTHOPEDIC PROSTHETIC DEVICES, IMPLANTS AND GRAFTS, INITIAL ENCOUNTER: ICD-10-CM

## 2023-03-03 PROCEDURE — 77002 NEEDLE LOCALIZATION BY XRAY: CPT | Mod: 26

## 2023-03-03 PROCEDURE — 20610 DRAIN/INJ JOINT/BURSA W/O US: CPT

## 2023-03-03 PROCEDURE — 77002 NEEDLE LOCALIZATION BY XRAY: CPT

## 2023-03-03 PROCEDURE — 20610 DRAIN/INJ JOINT/BURSA W/O US: CPT | Mod: RT

## 2023-03-06 ENCOUNTER — NON-APPOINTMENT (OUTPATIENT)
Age: 68
End: 2023-03-06

## 2023-03-06 LAB
B PERT IGG+IGM PNL SER: ABNORMAL
COLOR FLD: NORMAL
FLUID INTAKE SUBSTANCE CLASS: NORMAL
LYMPHOCYTES # FLD MANUAL: 30 %
MONOS+MACROS NFR FLD MANUAL: 10 %
NEUTS SEG # FLD MANUAL: 60 %
RBC # FLD MANUAL: ABNORMAL /UL
SYCRY CLARITY: ABNORMAL
SYCRY COLOR: ABNORMAL
SYCRY ID: ABNORMAL
SYCRY TUBE: NORMAL
TOTAL CELLS COUNTED FLD: 30 /UL
TUBE TYPE: NORMAL

## 2023-03-14 NOTE — PROVIDER CONTACT NOTE (OTHER) - DATE AND TIME:
Pt states that her right ear is still hurting and is requesting a different antibiotic to be sent in 20-May-2021 19:40

## 2023-03-27 ENCOUNTER — APPOINTMENT (OUTPATIENT)
Dept: PULMONOLOGY | Facility: CLINIC | Age: 68
End: 2023-03-27

## 2023-05-05 NOTE — PROGRESS NOTE ADULT - PROBLEM/PLAN-4
DISPLAY PLAN FREE TEXT
diabetes

## 2023-05-16 ENCOUNTER — TRANSCRIPTION ENCOUNTER (OUTPATIENT)
Age: 68
End: 2023-05-16

## 2023-05-22 ENCOUNTER — APPOINTMENT (OUTPATIENT)
Dept: INTERNAL MEDICINE | Facility: CLINIC | Age: 68
End: 2023-05-22
Payer: MEDICARE

## 2023-05-22 VITALS
HEART RATE: 93 BPM | TEMPERATURE: 97.3 F | WEIGHT: 192 LBS | BODY MASS INDEX: 30.99 KG/M2 | DIASTOLIC BLOOD PRESSURE: 80 MMHG | OXYGEN SATURATION: 98 % | SYSTOLIC BLOOD PRESSURE: 154 MMHG

## 2023-05-22 DIAGNOSIS — Z23 ENCOUNTER FOR IMMUNIZATION: ICD-10-CM

## 2023-05-22 DIAGNOSIS — E11.37X2 TYPE 2 DIABETES MELLITUS WITH DIABETIC MACULAR EDEMA, RESOLVED FOLLOWING TREATMENT, LEFT EYE: ICD-10-CM

## 2023-05-22 DIAGNOSIS — Z79.4 TYPE 2 DIABETES MELLITUS WITH DIABETIC MACULAR EDEMA, RESOLVED FOLLOWING TREATMENT, LEFT EYE: ICD-10-CM

## 2023-05-22 PROCEDURE — 99214 OFFICE O/P EST MOD 30 MIN: CPT | Mod: 25

## 2023-05-22 PROCEDURE — 90471 IMMUNIZATION ADMIN: CPT

## 2023-05-22 PROCEDURE — 90715 TDAP VACCINE 7 YRS/> IM: CPT

## 2023-05-22 NOTE — HISTORY OF PRESENT ILLNESS
[de-identified] : Patient for follow-up from recent new patient visit.  Patient has Type 2 DM, and is seeing Endocrine.  She brought in her medication bottle, and is on Tradjenta 5mg daily.  She says she has been off the metformin since Nov 2022.  A1c in recent physical was 6.4%.  She saw her ophthalmologist in 2023 and her retinopathy is being followed.  She only took 2 of the BP medications yesterday and today.\par \par Patient has had recurrent UTI's, although she is currently asymptomatic.  Recent U/A normal.\par \par Patient has been seen by Heme/Onc at [facility] for polycythemia, and notes she is seeing a different Hematologist elsewhere.\par \par Patient is getting itchy eyes, nasal discharge since about March 2023.  She needs a Flonase script.\par \par She has seen ortho.\par \par She had 2 doses of Shingrix at a [facility] pharmacy and will forward the dates.

## 2023-05-22 NOTE — ASSESSMENT
[FreeTextEntry1] : (1) HTN - patient's BP remains elevated above goal.  However, she did not take all of the medications in the past 2 days.  I advised her to do so before her visits.  She says her SBPs range 120s-140s at home.  Patient to call if her pulse drops below 50, or SBP <100mm Hg.  She will continue to monitor her BP at least several days a week.  Follow-up in 3-4 weeks.\par \par (2) Type 2 DM - patient on Tradjenta 5mg daily, and followed by Endocrine.  She is on a statin and an ARB.  LDL is <100, and A1c is <7.0%.  BP above goal, but patient didn't take all of her medications today.\par \par (3) Neck swelling - neck US normal.\par \par (4) Ortho follow-up for leg pains. \par \par (5) Seasonal allergies - can use Allegra, Flonase.\par \par Labs drawn in office as below.

## 2023-05-22 NOTE — PHYSICAL EXAM
[No Edema] : there was no peripheral edema [Normal] : soft, non-tender, non-distended, no masses palpated, no HSM and normal bowel sounds [Comprehensive Foot Exam Normal] : Right and left foot were examined and both feet are normal. No ulcers in either foot. Toes are normal and with full ROM.  Normal tactile sensation with monofilament testing throughout both feet

## 2023-05-23 LAB
ALBUMIN SERPL ELPH-MCNC: 4.9 G/DL
ALP BLD-CCNC: 148 U/L
ALT SERPL-CCNC: 29 U/L
ANION GAP SERPL CALC-SCNC: 13 MMOL/L
AST SERPL-CCNC: 25 U/L
BILIRUB SERPL-MCNC: 0.4 MG/DL
BUN SERPL-MCNC: 17 MG/DL
CALCIUM SERPL-MCNC: 10 MG/DL
CHLORIDE SERPL-SCNC: 101 MMOL/L
CHOLEST SERPL-MCNC: 177 MG/DL
CO2 SERPL-SCNC: 26 MMOL/L
CREAT SERPL-MCNC: 1.09 MG/DL
CREAT SPEC-SCNC: 171 MG/DL
EGFR: 55 ML/MIN/1.73M2
ESTIMATED AVERAGE GLUCOSE: 128 MG/DL
GLUCOSE SERPL-MCNC: 94 MG/DL
HBA1C MFR BLD HPLC: 6.1 %
HDLC SERPL-MCNC: 67 MG/DL
LDLC SERPL CALC-MCNC: 93 MG/DL
MICROALBUMIN 24H UR DL<=1MG/L-MCNC: 2.2 MG/DL
MICROALBUMIN/CREAT 24H UR-RTO: 13 MG/G
NONHDLC SERPL-MCNC: 111 MG/DL
POTASSIUM SERPL-SCNC: 4.3 MMOL/L
PROT SERPL-MCNC: 8.6 G/DL
SODIUM SERPL-SCNC: 140 MMOL/L
TRIGL SERPL-MCNC: 87 MG/DL

## 2023-07-12 ENCOUNTER — APPOINTMENT (OUTPATIENT)
Dept: INTERNAL MEDICINE | Facility: CLINIC | Age: 68
End: 2023-07-12
Payer: MEDICARE

## 2023-07-12 VITALS
DIASTOLIC BLOOD PRESSURE: 68 MMHG | TEMPERATURE: 97.2 F | HEIGHT: 68 IN | HEART RATE: 90 BPM | WEIGHT: 193 LBS | OXYGEN SATURATION: 98 % | SYSTOLIC BLOOD PRESSURE: 132 MMHG | BODY MASS INDEX: 29.25 KG/M2

## 2023-07-12 DIAGNOSIS — R60.0 LOCALIZED EDEMA: ICD-10-CM

## 2023-07-12 DIAGNOSIS — N39.0 URINARY TRACT INFECTION, SITE NOT SPECIFIED: ICD-10-CM

## 2023-07-12 PROCEDURE — 99214 OFFICE O/P EST MOD 30 MIN: CPT | Mod: 25

## 2023-07-12 NOTE — ASSESSMENT
[FreeTextEntry1] : Patient with symptoms of an acute UTI.  U/A, C&S sent today.  Patient given Augmentin, and she is to contact me for rash, diarrhea, or other adverse effect.  She can use vitamin C (which she has already), and cranberry tablets.\par \par Script for support stockings sent to pharmacy for venous insufficiency.\par \par Script given for patient to return to her orthopedist for ongoing hip pains.

## 2023-07-12 NOTE — HISTORY OF PRESENT ILLNESS
[de-identified] : Patient developed a pain about 3 days ago in the R hip/flank area.  She has burning of the urine, and urgency.  No fevers, but she had chills.  She did not self medicate.  The symptoms are similar to prior UTIs.\par  \par Patient notes that she is now on nifedipine ER 60mg daily from Dr. Mitul Paris.  She believes that one of the other medications was discontinued, and will check on this.

## 2023-07-12 NOTE — PHYSICAL EXAM
[Normal] : normal rate, regular rhythm, normal S1 and S2 and no murmur heard [Soft] : abdomen soft [Non Tender] : non-tender [Non-distended] : non-distended [No Masses] : no abdominal mass palpated [Normal Bowel Sounds] : normal bowel sounds [de-identified] : 1-2+ b/l LE edema [de-identified] : Slight CVA tenderness

## 2023-07-14 ENCOUNTER — APPOINTMENT (OUTPATIENT)
Dept: ORTHOPEDIC SURGERY | Facility: CLINIC | Age: 68
End: 2023-07-14
Payer: MEDICARE

## 2023-07-14 VITALS
WEIGHT: 196 LBS | OXYGEN SATURATION: 100 % | HEART RATE: 91 BPM | DIASTOLIC BLOOD PRESSURE: 78 MMHG | SYSTOLIC BLOOD PRESSURE: 160 MMHG | BODY MASS INDEX: 29.7 KG/M2 | HEIGHT: 68 IN

## 2023-07-14 PROCEDURE — 99213 OFFICE O/P EST LOW 20 MIN: CPT

## 2023-07-14 PROCEDURE — 73502 X-RAY EXAM HIP UNI 2-3 VIEWS: CPT

## 2023-07-17 NOTE — PHYSICAL EXAM
[de-identified] : Constitutional: 67 year old female, alert and oriented, cooperative, in no acute distress.\par \par HEENT \par NC/AT.  Appearance: symmetric\par \par Neck/Back\par Straight without deformity or instability.  Good ROM.\par \par Chest/Respiratory \par Respiratory effort: no intercostal retractions or use of accessory muscles. Nonlabored Breathing\par \par Mental Status: \par Judgment, insight: intact\par Orientation: oriented to time, place, and person\par \par Neurological:\par Sensory and Motor are grossly intact throughout\par \par Right Hip Exam:\par Inspection/Appearance:\par      Posterior hip Incision well healed, no erythema or drainage\par \par Tenderness: \par 	Sacroiliac: Positive \par 	Greater trochanter: Negative \par                 ESTELLA Test: Positive\par                 FADIR Test: Positive\par \par Range of Motion: Pain with hip ROM\par                 Extension - 0 \par 	Flexion - 80 \par 	IR - 0\par 	ER - 30 \par 	Abd - 30\par 	Add - 20 \par \par Stability: Normal without instability\par \par Neurologic Exam\par     Motor intact including 5/5 Extensor Hallucis Longus, 5/5 Flexor Hallucis Longus, 5/5 Tibialis Anterior and 5/5 Gastrocnemius\par     Sensation Intact to Light Touch including Saphenous, Sural, Superficial Peroneal, Deep Peroneal, Tibial nerve distributions\par \par Vascular Exam\par     Foot is warm and well perfused with 2+ Dorsalis Pedis Pulse\par \par No lumbar paraspinal muscle tenderness.  [de-identified] : XRay:  XRays of the Pelvis (1 View) and Right Hip (2 Views) taken today in the office and discussed with the patient. XRays demonstrate a Right Total Hip Arthroplasty.  There is some acetabular profunda. There are 3 acetabular screws in place.  There is heterotopic ossification around the hip capsule and proximal femur/femoral stem.  (my personal interpretation)\par \par XRay:  XRays of the Lumbar Spine (2 Views) taken on 2/24/2023. XRays demonstrate no obvious fracture or dislocation.  There is a small spondylolisthesis on L4-5.  (my personal interpretation).
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
## 2023-07-21 ENCOUNTER — APPOINTMENT (OUTPATIENT)
Dept: INTERNAL MEDICINE | Facility: CLINIC | Age: 68
End: 2023-07-21
Payer: MEDICARE

## 2023-07-21 LAB
APPEARANCE: ABNORMAL
BACTERIA UR CULT: ABNORMAL
BACTERIA: ABNORMAL /HPF
BILIRUBIN URINE: NEGATIVE
BLOOD URINE: ABNORMAL
CAST: 0 /LPF
COLOR: YELLOW
EPITHELIAL CELLS: 3 /HPF
GLUCOSE QUALITATIVE U: NEGATIVE MG/DL
KETONES URINE: NEGATIVE MG/DL
LEUKOCYTE ESTERASE URINE: ABNORMAL
MICROSCOPIC-UA: NORMAL
NITRITE URINE: NEGATIVE
PH URINE: 5.5
PROTEIN URINE: NORMAL MG/DL
RED BLOOD CELLS URINE: 4 /HPF
SPECIFIC GRAVITY URINE: 1.02
UROBILINOGEN URINE: 0.2 MG/DL
WHITE BLOOD CELLS URINE: 65 /HPF

## 2023-07-21 PROCEDURE — 99442: CPT

## 2023-07-25 ENCOUNTER — OUTPATIENT (OUTPATIENT)
Dept: OUTPATIENT SERVICES | Facility: HOSPITAL | Age: 68
LOS: 1 days | End: 2023-07-25
Payer: MEDICARE

## 2023-07-25 ENCOUNTER — RESULT REVIEW (OUTPATIENT)
Age: 68
End: 2023-07-25

## 2023-07-25 ENCOUNTER — APPOINTMENT (OUTPATIENT)
Dept: CT IMAGING | Facility: CLINIC | Age: 68
End: 2023-07-25
Payer: MEDICARE

## 2023-07-25 ENCOUNTER — APPOINTMENT (OUTPATIENT)
Dept: ORTHOPEDIC SURGERY | Facility: CLINIC | Age: 68
End: 2023-07-25
Payer: MEDICARE

## 2023-07-25 VITALS — HEIGHT: 68 IN | BODY MASS INDEX: 29.7 KG/M2 | WEIGHT: 196 LBS

## 2023-07-25 DIAGNOSIS — T84.84XA PAIN DUE TO INTERNAL ORTHOPEDIC PROSTHETIC DEVICES, IMPLANTS AND GRAFTS, INITIAL ENCOUNTER: ICD-10-CM

## 2023-07-25 DIAGNOSIS — M21.41 FLAT FOOT [PES PLANUS] (ACQUIRED), RIGHT FOOT: ICD-10-CM

## 2023-07-25 DIAGNOSIS — M21.861 OTHER SPECIFIED ACQUIRED DEFORMITIES OF RIGHT LOWER LEG: ICD-10-CM

## 2023-07-25 DIAGNOSIS — M76.821 POSTERIOR TIBIAL TENDINITIS, RIGHT LEG: ICD-10-CM

## 2023-07-25 DIAGNOSIS — Z98.890 OTHER SPECIFIED POSTPROCEDURAL STATES: ICD-10-CM

## 2023-07-25 DIAGNOSIS — M25.571 PAIN IN RIGHT ANKLE AND JOINTS OF RIGHT FOOT: ICD-10-CM

## 2023-07-25 DIAGNOSIS — R20.0 ANESTHESIA OF SKIN: ICD-10-CM

## 2023-07-25 DIAGNOSIS — Z00.8 ENCOUNTER FOR OTHER GENERAL EXAMINATION: ICD-10-CM

## 2023-07-25 PROCEDURE — 76376 3D RENDER W/INTRP POSTPROCES: CPT | Mod: 26

## 2023-07-25 PROCEDURE — 73610 X-RAY EXAM OF ANKLE: CPT | Mod: RT

## 2023-07-25 PROCEDURE — 99214 OFFICE O/P EST MOD 30 MIN: CPT

## 2023-07-25 PROCEDURE — 73700 CT LOWER EXTREMITY W/O DYE: CPT | Mod: 26,RT

## 2023-07-25 PROCEDURE — 76376 3D RENDER W/INTRP POSTPROCES: CPT

## 2023-07-25 PROCEDURE — 73700 CT LOWER EXTREMITY W/O DYE: CPT

## 2023-07-25 PROCEDURE — 73620 X-RAY EXAM OF FOOT: CPT | Mod: RT

## 2023-07-26 PROBLEM — Z98.890 HISTORY OF ANKLE SURGERY: Status: ACTIVE | Noted: 2023-07-26

## 2023-07-26 PROBLEM — M76.821 POSTERIOR TIBIAL TENDON DYSFUNCTION, RIGHT: Status: ACTIVE | Noted: 2023-07-26

## 2023-07-26 PROBLEM — M21.861 GASTROCNEMIUS EQUINUS OF RIGHT LOWER EXTREMITY: Status: ACTIVE | Noted: 2023-07-26

## 2023-07-26 PROBLEM — M21.41 ACQUIRED PES PLANUS OF RIGHT FOOT: Status: ACTIVE | Noted: 2023-07-26

## 2023-07-26 PROBLEM — R20.0 NUMBNESS OF FOOT: Status: ACTIVE | Noted: 2023-07-26

## 2023-08-03 ENCOUNTER — NON-APPOINTMENT (OUTPATIENT)
Age: 68
End: 2023-08-03

## 2023-08-18 ENCOUNTER — APPOINTMENT (OUTPATIENT)
Dept: ORTHOPEDIC SURGERY | Facility: CLINIC | Age: 68
End: 2023-08-18
Payer: MEDICARE

## 2023-08-18 VITALS
DIASTOLIC BLOOD PRESSURE: 72 MMHG | HEIGHT: 68 IN | BODY MASS INDEX: 30.92 KG/M2 | HEART RATE: 69 BPM | SYSTOLIC BLOOD PRESSURE: 131 MMHG | WEIGHT: 204 LBS

## 2023-08-18 PROCEDURE — 99213 OFFICE O/P EST LOW 20 MIN: CPT
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
## 2023-08-21 NOTE — PHYSICAL EXAM
[de-identified] : Constitutional: 67 year old female, alert and oriented, cooperative, in no acute distress.  HEENT  NC/AT.  Appearance: symmetric  Neck/Back Straight without deformity or instability.  Good ROM.  Chest/Respiratory  Respiratory effort: no intercostal retractions or use of accessory muscles. Nonlabored Breathing  Mental Status:  Judgment, insight: intact Orientation: oriented to time, place, and person  Neurological: Sensory and Motor are grossly intact throughout  Right Hip Exam: Inspection/Appearance:      Posterior hip Incision well healed, no erythema or drainage  Tenderness:  	Sacroiliac: Positive  	Greater trochanter: Negative                  ESTELLA Test: Positive                 FADIR Test: Positive  Range of Motion: Pain with hip ROM         Extension - 0  	Flexion - 80  	IR - 0 	ER - 20  	Abd - 30 	Add - 20   Stability: Normal without instability  Neurologic Exam     Motor intact including 5/5 Extensor Hallucis Longus, 5/5 Flexor Hallucis Longus, 5/5 Tibialis Anterior and 5/5 Gastrocnemius     Sensation Intact to Light Touch including Saphenous, Sural, Superficial Peroneal, Deep Peroneal, Tibial nerve distributions  Vascular Exam     Foot is warm and well perfused with 2+ Dorsalis Pedis Pulse  No lumbar paraspinal muscle tenderness.  [de-identified] : XRay:  XRays of the Pelvis (1 View) and Right Hip (2 Views) taken on 7/14/2023. XRays demonstrate a Right Total Hip Arthroplasty.  There is some acetabular profunda. There are 3 acetabular screws in place.  There is heterotopic ossification around the hip capsule and proximal femur/femoral stem.  (my personal interpretation)  XRay:  XRays of the Lumbar Spine (2 Views) taken on 2/24/2023. XRays demonstrate no obvious fracture or dislocation.  There is a small spondylolisthesis on L4-5.  (my personal interpretation).    EXAM: [id number] - CT 3D RECONSTRUCT WO [facility]  - ORDERED BY: SUSSY MCCULLOUGH  EXAM: [id number] - CT HIP ONLY RT  - ORDERED BY: SUSSY MCCULLOUGH   PROCEDURE DATE:  07/25/2023    INTERPRETATION:  CT OF THE RIGHT HIP  CLINICAL INFORMATION: Pain, status post total arthroplasty 1 year prior.  COMPARISON: Postoperative fluoroscopic guided aspiration dated 3/3/2023. Preoperative CT abdomen and pelvis dated 6/2/2021.  TECHNIQUE: Axial CT images were obtained of the right hip with coronal and sagittal reconstructions. 3-D volume rendered reformats were also provided. No intravenous contrast was administered.  FINDINGS:  Osseous: Status post remote noncemented right hip total arthroplasty with 3 acetabular augmentation screws. Protrusio of the acetabular component. No osteolysis, definite subsidence, or displaced periprosthetic fracture. Michelle grade 2/3 heterotopic ossification most prominently involving the anterior iliofemoral capsular ligament. Mineralization otherwise within normal limits. No aggressive osseous neoplasm  Soft tissues: No definite postsurgical effusion or periprosthetic fluid collection. No sizable hyperattenuating hematoma. No pelvic free fluid.  IMPRESSION:  1.  Status post remote right hip total arthroplasty with nonspecific protrusio of the acetabular component, both without convincing CT evidence for hardware loosening or periprosthetic fracture. No definite postsurgical effusion. 2.  Michelle grade 2/3 right hip periprosthetic heterotopic ossification, most prominently involving the anterior iliofemoral capsular ligament.  --- End of Report ---  ALBERTO ESPITIA MD; Attending Radiologist This document has been electronically signed. Jul 27 2023  2:03PM

## 2023-08-25 ENCOUNTER — APPOINTMENT (OUTPATIENT)
Dept: ORTHOPEDIC SURGERY | Facility: CLINIC | Age: 68
End: 2023-08-25
Payer: MEDICARE

## 2023-08-25 VITALS
BODY MASS INDEX: 30.92 KG/M2 | SYSTOLIC BLOOD PRESSURE: 144 MMHG | WEIGHT: 204 LBS | HEART RATE: 76 BPM | DIASTOLIC BLOOD PRESSURE: 78 MMHG | HEIGHT: 68 IN

## 2023-08-25 DIAGNOSIS — M89.8X9 OTHER SPECIFIED DISORDERS OF BONE, UNSPECIFIED SITE: ICD-10-CM

## 2023-08-25 PROCEDURE — 99213 OFFICE O/P EST LOW 20 MIN: CPT | Mod: 25

## 2023-08-25 PROCEDURE — 73564 X-RAY EXAM KNEE 4 OR MORE: CPT | Mod: LT

## 2023-08-25 PROCEDURE — 20610 DRAIN/INJ JOINT/BURSA W/O US: CPT | Mod: LT

## 2023-09-01 ENCOUNTER — APPOINTMENT (OUTPATIENT)
Dept: INTERNAL MEDICINE | Facility: CLINIC | Age: 68
End: 2023-09-01
Payer: MEDICARE

## 2023-09-01 VITALS
BODY MASS INDEX: 30.46 KG/M2 | WEIGHT: 201 LBS | TEMPERATURE: 97.6 F | HEIGHT: 68 IN | OXYGEN SATURATION: 97 % | SYSTOLIC BLOOD PRESSURE: 137 MMHG | HEART RATE: 76 BPM | DIASTOLIC BLOOD PRESSURE: 80 MMHG | RESPIRATION RATE: 76 BRPM

## 2023-09-01 DIAGNOSIS — Z02.89 ENCOUNTER FOR OTHER ADMINISTRATIVE EXAMINATIONS: ICD-10-CM

## 2023-09-01 DIAGNOSIS — M19.90 UNSPECIFIED OSTEOARTHRITIS, UNSPECIFIED SITE: ICD-10-CM

## 2023-09-01 PROCEDURE — 99213 OFFICE O/P EST LOW 20 MIN: CPT | Mod: 25

## 2023-09-01 NOTE — HISTORY OF PRESENT ILLNESS
[de-identified] : Patient presents for completion of form for a personal care aide.  Patient has a history of osteoarthritis, and heterotopic ossification.  She is seeing an orthopedist.  She has pains in her L knee, R hip, and R lower leg.  She is getting PT and uses Tylenol.  She needs to walk with a walker.  She needs assistance with getting dressed, bathing, getting into her shower, doing light housekeeping.  She does not have skilled nursing needs at this time.  She does have Type 2 DM and HTN but is able to use her glucometer and blood pressure cuff on her own.

## 2023-09-01 NOTE — ASSESSMENT
[FreeTextEntry1] : Forms completed for patient to get personal care aide.  Due to her significant pains from OA and heterotopic ossification, she will need assistance with getting dressed, bathing, getting into her shower, and light housekeeping.

## 2023-09-03 NOTE — PHYSICAL EXAM
[de-identified] : Constitutional: 67 year old female, alert and oriented, cooperative, in no acute distress.  HEENT  NC/AT.  Appearance: symmetric  Neck/Back Straight without deformity or instability.  Good ROM.  Chest/Respiratory  Respiratory effort: no intercostal retractions or use of accessory muscles. Nonlabored Breathing  Mental Status:  Judgment, insight: intact Orientation: oriented to time, place, and person  Neurological: Sensory and Motor are grossly intact throughout  Right Hip Exam:  Tenderness:  	Sacroiliac: Positive  	Greater trochanter: Negative                  ESTELLA Test: Positive                 FADIR Test: Positive  Range of Motion: Pain with hip ROM         Extension - 0  	Flexion - 80  	IR - 0 	ER - 20  	Abd - 30 	Add - 20   Stability: Normal without instability  Neurologic Exam     Motor intact including 5/5 Extensor Hallucis Longus, 5/5 Flexor Hallucis Longus, 5/5 Tibialis Anterior and 5/5 Gastrocnemius     Sensation Intact to Light Touch including Saphenous, Sural, Superficial Peroneal, Deep Peroneal, Tibial nerve distributions  Vascular Exam     Foot is warm and well perfused with 2+ Dorsalis Pedis Pulse  There is Right lumbar paraspinal muscle tenderness.  Left Knee  Inspection:     Skin intact, no rashes or lesions     No Effusion     Non-tender to palpation over tibial tubercle, patella, lateral joint line, and pes insertion.     Tenderness over the medial joint line  Range of Motion: 	Extension - 0 degrees 	Flexion - 120 degrees 	Extensor lag: None  Stability:      Demonstrates no Varus or Valgus instability      Negative Anterior or Posterior drawer.      Negative Lachman's      Negative McMurrays  Patella: stable, tracks well.  [de-identified] : XRay: XRays of the Left  Knee (4 Views) taken in the office today and reviewed with the patient. XRays demonstrate joint space narrowing in the medial and patellofemoral compartments, consistent with osteoarthritis, KL Grade: 2. (my personal interpretation)    XRay:  XRays of the Pelvis (1 View) and Right Hip (2 Views) taken on 7/14/2023. XRays demonstrate a Right Total Hip Arthroplasty.  There is some acetabular profunda. There are 3 acetabular screws in place.  There is heterotopic ossification around the hip capsule and proximal femur/femoral stem.  (my personal interpretation)  XRay:  XRays of the Lumbar Spine (2 Views) taken on 2/24/2023. XRays demonstrate no obvious fracture or dislocation.  There is a small spondylolisthesis on L4-5.  (my personal interpretation).    EXAM: [id number] - CT 3D RECONSTRUCT WO [facility]  - ORDERED BY: SUSSY MCCULLOUGH  EXAM: [id number] - CT HIP ONLY RT  - ORDERED BY: SUSSY MCCULLOUGH   PROCEDURE DATE:  07/25/2023    INTERPRETATION:  CT OF THE RIGHT HIP  CLINICAL INFORMATION: Pain, status post total arthroplasty 1 year prior.  COMPARISON: Postoperative fluoroscopic guided aspiration dated 3/3/2023. Preoperative CT abdomen and pelvis dated 6/2/2021.  TECHNIQUE: Axial CT images were obtained of the right hip with coronal and sagittal reconstructions. 3-D volume rendered reformats were also provided. No intravenous contrast was administered.  FINDINGS:  Osseous: Status post remote noncemented right hip total arthroplasty with 3 acetabular augmentation screws. Protrusio of the acetabular component. No osteolysis, definite subsidence, or displaced periprosthetic fracture. Michelle grade 2/3 heterotopic ossification most prominently involving the anterior iliofemoral capsular ligament. Mineralization otherwise within normal limits. No aggressive osseous neoplasm  Soft tissues: No definite postsurgical effusion or periprosthetic fluid collection. No sizable hyperattenuating hematoma. No pelvic free fluid.  IMPRESSION:  1.  Status post remote right hip total arthroplasty with nonspecific protrusio of the acetabular component, both without convincing CT evidence for hardware loosening or periprosthetic fracture. No definite postsurgical effusion. 2.  Michelle grade 2/3 right hip periprosthetic heterotopic ossification, most prominently involving the anterior iliofemoral capsular ligament.  --- End of Report ---  ALBERTO ESPITIA MD; Attending Radiologist This document has been electronically signed. Jul 27 2023  2:03PM
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
## 2023-09-07 DIAGNOSIS — R92.2 INCONCLUSIVE MAMMOGRAM: ICD-10-CM

## 2023-09-20 ENCOUNTER — APPOINTMENT (OUTPATIENT)
Dept: INTERNAL MEDICINE | Facility: CLINIC | Age: 68
End: 2023-09-20

## 2023-09-22 ENCOUNTER — RESULT REVIEW (OUTPATIENT)
Age: 68
End: 2023-09-22

## 2023-09-22 ENCOUNTER — APPOINTMENT (OUTPATIENT)
Dept: ULTRASOUND IMAGING | Facility: IMAGING CENTER | Age: 68
End: 2023-09-22
Payer: MEDICARE

## 2023-09-22 ENCOUNTER — APPOINTMENT (OUTPATIENT)
Dept: ORTHOPEDIC SURGERY | Facility: CLINIC | Age: 68
End: 2023-09-22

## 2023-09-22 ENCOUNTER — APPOINTMENT (OUTPATIENT)
Dept: MAMMOGRAPHY | Facility: IMAGING CENTER | Age: 68
End: 2023-09-22
Payer: MEDICARE

## 2023-09-22 ENCOUNTER — OUTPATIENT (OUTPATIENT)
Dept: OUTPATIENT SERVICES | Facility: HOSPITAL | Age: 68
LOS: 1 days | End: 2023-09-22
Payer: MEDICARE

## 2023-09-22 DIAGNOSIS — Z00.8 ENCOUNTER FOR OTHER GENERAL EXAMINATION: ICD-10-CM

## 2023-09-22 PROCEDURE — 77067 SCR MAMMO BI INCL CAD: CPT | Mod: 26

## 2023-09-22 PROCEDURE — 76641 ULTRASOUND BREAST COMPLETE: CPT | Mod: 26,50

## 2023-09-22 PROCEDURE — 77063 BREAST TOMOSYNTHESIS BI: CPT | Mod: 26

## 2023-09-22 PROCEDURE — 77067 SCR MAMMO BI INCL CAD: CPT

## 2023-09-22 PROCEDURE — 76641 ULTRASOUND BREAST COMPLETE: CPT

## 2023-09-22 PROCEDURE — 77063 BREAST TOMOSYNTHESIS BI: CPT

## 2023-09-26 ENCOUNTER — APPOINTMENT (OUTPATIENT)
Dept: INTERNAL MEDICINE | Facility: CLINIC | Age: 68
End: 2023-09-26
Payer: MEDICARE

## 2023-09-26 VITALS
HEIGHT: 68 IN | SYSTOLIC BLOOD PRESSURE: 144 MMHG | HEART RATE: 80 BPM | DIASTOLIC BLOOD PRESSURE: 72 MMHG | WEIGHT: 201.13 LBS | BODY MASS INDEX: 30.48 KG/M2 | TEMPERATURE: 97.6 F | OXYGEN SATURATION: 99 %

## 2023-09-26 DIAGNOSIS — Z23 ENCOUNTER FOR IMMUNIZATION: ICD-10-CM

## 2023-09-26 PROCEDURE — G0008: CPT

## 2023-09-26 PROCEDURE — 99214 OFFICE O/P EST MOD 30 MIN: CPT | Mod: 25

## 2023-09-26 PROCEDURE — 90662 IIV NO PRSV INCREASED AG IM: CPT

## 2023-09-26 RX ORDER — AMLODIPINE BESYLATE 10 MG/1
10 TABLET ORAL
Refills: 3 | Status: COMPLETED | COMMUNITY
End: 2023-09-26

## 2023-09-26 RX ORDER — AMOXICILLIN AND CLAVULANATE POTASSIUM 875; 125 MG/1; MG/1
875-125 TABLET, COATED ORAL
Qty: 14 | Refills: 0 | Status: COMPLETED | COMMUNITY
Start: 2023-07-12 | End: 2023-09-26

## 2023-10-02 ENCOUNTER — APPOINTMENT (OUTPATIENT)
Dept: INTERNAL MEDICINE | Facility: CLINIC | Age: 68
End: 2023-10-02
Payer: MEDICARE

## 2023-10-02 LAB
ALBUMIN SERPL ELPH-MCNC: 4.9 G/DL
ALP BLD-CCNC: 141 U/L
ALT SERPL-CCNC: 26 U/L
AMYLASE/CREAT SERPL: 89 U/L
ANION GAP SERPL CALC-SCNC: 12 MMOL/L
AST SERPL-CCNC: 24 U/L
BILIRUB SERPL-MCNC: 0.2 MG/DL
BUN SERPL-MCNC: 19 MG/DL
CALCIUM SERPL-MCNC: 10.7 MG/DL
CHLORIDE SERPL-SCNC: 105 MMOL/L
CO2 SERPL-SCNC: 26 MMOL/L
CREAT SERPL-MCNC: 1.25 MG/DL
EGFR: 47 ML/MIN/1.73M2
GLUCOSE SERPL-MCNC: 131 MG/DL
HCT VFR BLD CALC: 41.2 %
HGB BLD-MCNC: 13.6 G/DL
LPL SERPL-CCNC: 66 U/L
MCHC RBC-ENTMCNC: 28.6 PG
MCHC RBC-ENTMCNC: 33 GM/DL
MCV RBC AUTO: 86.6 FL
PLATELET # BLD AUTO: 291 K/UL
POTASSIUM SERPL-SCNC: 4.9 MMOL/L
PROT SERPL-MCNC: 8.9 G/DL
RBC # BLD: 4.76 M/UL
RBC # FLD: 14.3 %
SODIUM SERPL-SCNC: 143 MMOL/L
WBC # FLD AUTO: 8.41 K/UL

## 2023-10-02 PROCEDURE — 99441: CPT

## 2023-10-06 ENCOUNTER — APPOINTMENT (OUTPATIENT)
Dept: GASTROENTEROLOGY | Facility: CLINIC | Age: 68
End: 2023-10-06
Payer: MEDICARE

## 2023-10-06 VITALS
BODY MASS INDEX: 29.55 KG/M2 | HEIGHT: 68 IN | OXYGEN SATURATION: 99 % | SYSTOLIC BLOOD PRESSURE: 139 MMHG | DIASTOLIC BLOOD PRESSURE: 72 MMHG | WEIGHT: 195 LBS | HEART RATE: 75 BPM | TEMPERATURE: 97.5 F

## 2023-10-06 DIAGNOSIS — R10.9 UNSPECIFIED ABDOMINAL PAIN: ICD-10-CM

## 2023-10-06 DIAGNOSIS — K59.00 CONSTIPATION, UNSPECIFIED: ICD-10-CM

## 2023-10-06 DIAGNOSIS — Z12.11 ENCOUNTER FOR SCREENING FOR MALIGNANT NEOPLASM OF COLON: ICD-10-CM

## 2023-10-06 DIAGNOSIS — Z12.12 ENCOUNTER FOR SCREENING FOR MALIGNANT NEOPLASM OF COLON: ICD-10-CM

## 2023-10-06 DIAGNOSIS — K57.92 DIVERTICULITIS OF INTESTINE, PART UNSPECIFIED, W/OUT PERFORATION OR ABSCESS W/OUT BLEEDING: ICD-10-CM

## 2023-10-06 PROCEDURE — 99204 OFFICE O/P NEW MOD 45 MIN: CPT

## 2023-10-06 RX ORDER — DOCUSATE SODIUM 100 MG/1
100 CAPSULE ORAL TWICE DAILY
Qty: 60 | Refills: 3 | Status: ACTIVE | OUTPATIENT
Start: 2023-10-06

## 2023-10-12 ENCOUNTER — OUTPATIENT (OUTPATIENT)
Dept: OUTPATIENT SERVICES | Facility: HOSPITAL | Age: 68
LOS: 1 days | End: 2023-10-12
Payer: MEDICARE

## 2023-10-12 ENCOUNTER — APPOINTMENT (OUTPATIENT)
Dept: CT IMAGING | Facility: CLINIC | Age: 68
End: 2023-10-12
Payer: MEDICARE

## 2023-10-12 ENCOUNTER — RESULT REVIEW (OUTPATIENT)
Age: 68
End: 2023-10-12

## 2023-10-12 DIAGNOSIS — K59.00 CONSTIPATION, UNSPECIFIED: ICD-10-CM

## 2023-10-12 DIAGNOSIS — K57.92 DIVERTICULITIS OF INTESTINE, PART UNSPECIFIED, WITHOUT PERFORATION OR ABSCESS WITHOUT BLEEDING: ICD-10-CM

## 2023-10-12 DIAGNOSIS — R10.9 UNSPECIFIED ABDOMINAL PAIN: ICD-10-CM

## 2023-10-12 PROCEDURE — 74177 CT ABD & PELVIS W/CONTRAST: CPT

## 2023-10-12 PROCEDURE — 74177 CT ABD & PELVIS W/CONTRAST: CPT | Mod: 26

## 2023-10-18 ENCOUNTER — APPOINTMENT (OUTPATIENT)
Dept: GASTROENTEROLOGY | Facility: HOSPITAL | Age: 68
End: 2023-10-18

## 2023-10-18 ENCOUNTER — OUTPATIENT (OUTPATIENT)
Dept: OUTPATIENT SERVICES | Facility: HOSPITAL | Age: 68
LOS: 1 days | End: 2023-10-18
Payer: MEDICARE

## 2023-10-18 ENCOUNTER — TRANSCRIPTION ENCOUNTER (OUTPATIENT)
Age: 68
End: 2023-10-18

## 2023-10-18 VITALS
RESPIRATION RATE: 20 BRPM | OXYGEN SATURATION: 100 % | DIASTOLIC BLOOD PRESSURE: 77 MMHG | SYSTOLIC BLOOD PRESSURE: 172 MMHG | HEART RATE: 50 BPM

## 2023-10-18 VITALS
TEMPERATURE: 97 F | WEIGHT: 195.11 LBS | HEART RATE: 58 BPM | SYSTOLIC BLOOD PRESSURE: 156 MMHG | RESPIRATION RATE: 21 BRPM | OXYGEN SATURATION: 100 % | DIASTOLIC BLOOD PRESSURE: 77 MMHG | HEIGHT: 68 IN

## 2023-10-18 DIAGNOSIS — Z47.1 AFTERCARE FOLLOWING JOINT REPLACEMENT SURGERY: Chronic | ICD-10-CM

## 2023-10-18 DIAGNOSIS — Z96.641 PRESENCE OF RIGHT ARTIFICIAL HIP JOINT: Chronic | ICD-10-CM

## 2023-10-18 DIAGNOSIS — R19.7 DIARRHEA, UNSPECIFIED: ICD-10-CM

## 2023-10-18 LAB
GLUCOSE BLDC GLUCOMTR-MCNC: 82 MG/DL — SIGNIFICANT CHANGE UP (ref 70–99)
GLUCOSE BLDC GLUCOMTR-MCNC: 82 MG/DL — SIGNIFICANT CHANGE UP (ref 70–99)

## 2023-10-18 PROCEDURE — 45378 DIAGNOSTIC COLONOSCOPY: CPT

## 2023-10-18 PROCEDURE — G0121: CPT

## 2023-10-18 PROCEDURE — 82962 GLUCOSE BLOOD TEST: CPT

## 2023-10-18 RX ORDER — MIRTAZAPINE 45 MG/1
1 TABLET, ORALLY DISINTEGRATING ORAL
Refills: 0 | DISCHARGE

## 2023-10-18 RX ORDER — METOPROLOL TARTRATE 50 MG
1 TABLET ORAL
Refills: 0 | DISCHARGE

## 2023-10-18 RX ORDER — SODIUM CHLORIDE 9 MG/ML
500 INJECTION INTRAMUSCULAR; INTRAVENOUS; SUBCUTANEOUS
Refills: 0 | Status: DISCONTINUED | OUTPATIENT
Start: 2023-10-18 | End: 2023-11-01

## 2023-10-18 RX ORDER — FINERENONE 20 MG/1
2 TABLET, FILM COATED ORAL
Refills: 0 | DISCHARGE

## 2023-10-18 RX ORDER — LINAGLIPTIN 5 MG/1
1 TABLET, FILM COATED ORAL
Refills: 0 | DISCHARGE

## 2023-10-18 RX ORDER — NIFEDIPINE 30 MG
1 TABLET, EXTENDED RELEASE 24 HR ORAL
Refills: 0 | DISCHARGE

## 2023-10-18 RX ORDER — ESCITALOPRAM OXALATE 10 MG/1
1 TABLET, FILM COATED ORAL
Refills: 0 | DISCHARGE

## 2023-10-18 NOTE — ASU PATIENT PROFILE, ADULT - NSICDXPASTSURGICALHX_GEN_ALL_CORE_FT
PAST SURGICAL HISTORY:  Aftercare following right ankle joint replacement surgery     History of right hip replacement

## 2023-10-18 NOTE — PRE PROCEDURE NOTE - PRE PROCEDURE EVALUATION
Attending Physician:  Dr. Patel                Procedure: colonoscopy    Indication for Procedure: interval screening  ________________________________________________________  PAST MEDICAL & SURGICAL HISTORY:  HTN (hypertension)      DM (diabetes mellitus)      Hypercholesteremia      History of right hip replacement      Aftercare following right ankle joint replacement surgery        ALLERGIES:  ACE inhibitors (Angioedema)    HOME MEDICATIONS:  escitalopram 10 mg oral tablet: 1 tab(s) orally once a day (at bedtime)  finerenone 10 mg oral tablet: 2 tab(s) orally once a day  Metoprolol Succinate  mg oral tablet, extended release: 1 tab(s) orally once a day  Metoprolol Tartrate 100 mg oral tablet: 1 tab(s) orally  mirtazapine 15 mg oral tablet: 1 tab(s) orally once a day (at bedtime)  NIFEdipine 60 mg oral tablet, extended release: 1 tab(s) orally once a day  Tradjenta 5 mg oral tablet: 1 tab(s) orally once a day    AICD/PPM: [ ] yes   [x ] no    PERTINENT LAB DATA:                      PHYSICAL EXAMINATION:    Height (cm): 172.7  Weight (kg): 88.5  BMI (kg/m2): 29.7  BSA (m2): 2.02T(C): 36.1  HR: 58  BP: 156/77  RR: 21  SpO2: 100%    Constitutional: NAD  HEENT: PERRLA, EOMI,    Neck:  No JVD  Respiratory: CTAB/L  Cardiovascular: S1 and S2  Gastrointestinal: BS+, soft, NT/ND  Extremities: No peripheral edema  Neurological: A/O x 3, no focal deficits  Psychiatric: Normal mood, normal affect  Skin: No rashes    ASA Class: I [ ]  II [ x]  III [ ]  IV [ ]    COMMENTS:    The patient is a suitable candidate for the planned procedure unless box checked [ ]  No, explain:

## 2023-10-23 ENCOUNTER — TRANSCRIPTION ENCOUNTER (OUTPATIENT)
Age: 68
End: 2023-10-23

## 2023-10-23 ENCOUNTER — NON-APPOINTMENT (OUTPATIENT)
Age: 68
End: 2023-10-23

## 2023-10-24 ENCOUNTER — EMERGENCY (EMERGENCY)
Facility: HOSPITAL | Age: 68
LOS: 1 days | Discharge: HOME | End: 2023-10-24
Attending: EMERGENCY MEDICINE | Admitting: EMERGENCY MEDICINE
Payer: MEDICARE

## 2023-10-24 VITALS
SYSTOLIC BLOOD PRESSURE: 148 MMHG | HEIGHT: 68 IN | DIASTOLIC BLOOD PRESSURE: 78 MMHG | TEMPERATURE: 98 F | OXYGEN SATURATION: 98 % | RESPIRATION RATE: 20 BRPM | HEART RATE: 58 BPM

## 2023-10-24 VITALS
DIASTOLIC BLOOD PRESSURE: 77 MMHG | SYSTOLIC BLOOD PRESSURE: 166 MMHG | TEMPERATURE: 98 F | RESPIRATION RATE: 18 BRPM | OXYGEN SATURATION: 100 % | HEART RATE: 61 BPM

## 2023-10-24 DIAGNOSIS — Z96.641 PRESENCE OF RIGHT ARTIFICIAL HIP JOINT: Chronic | ICD-10-CM

## 2023-10-24 DIAGNOSIS — Z47.1 AFTERCARE FOLLOWING JOINT REPLACEMENT SURGERY: Chronic | ICD-10-CM

## 2023-10-24 LAB
A1C WITH ESTIMATED AVERAGE GLUCOSE RESULT: 5.7 % — HIGH (ref 4–5.6)
A1C WITH ESTIMATED AVERAGE GLUCOSE RESULT: 5.7 % — HIGH (ref 4–5.6)
ALBUMIN SERPL ELPH-MCNC: 4.3 G/DL — SIGNIFICANT CHANGE UP (ref 3.3–5)
ALBUMIN SERPL ELPH-MCNC: 4.3 G/DL — SIGNIFICANT CHANGE UP (ref 3.3–5)
ALP SERPL-CCNC: 115 U/L — SIGNIFICANT CHANGE UP (ref 40–120)
ALP SERPL-CCNC: 115 U/L — SIGNIFICANT CHANGE UP (ref 40–120)
ALT FLD-CCNC: 24 U/L — SIGNIFICANT CHANGE UP (ref 4–33)
ALT FLD-CCNC: 24 U/L — SIGNIFICANT CHANGE UP (ref 4–33)
ANION GAP SERPL CALC-SCNC: 10 MMOL/L — SIGNIFICANT CHANGE UP (ref 7–14)
ANION GAP SERPL CALC-SCNC: 10 MMOL/L — SIGNIFICANT CHANGE UP (ref 7–14)
APPEARANCE UR: ABNORMAL
APPEARANCE UR: ABNORMAL
AST SERPL-CCNC: 26 U/L — SIGNIFICANT CHANGE UP (ref 4–32)
AST SERPL-CCNC: 26 U/L — SIGNIFICANT CHANGE UP (ref 4–32)
BACTERIA # UR AUTO: ABNORMAL /HPF
BACTERIA # UR AUTO: ABNORMAL /HPF
BASOPHILS # BLD AUTO: 0.04 K/UL — SIGNIFICANT CHANGE UP (ref 0–0.2)
BASOPHILS # BLD AUTO: 0.04 K/UL — SIGNIFICANT CHANGE UP (ref 0–0.2)
BASOPHILS NFR BLD AUTO: 0.6 % — SIGNIFICANT CHANGE UP (ref 0–2)
BASOPHILS NFR BLD AUTO: 0.6 % — SIGNIFICANT CHANGE UP (ref 0–2)
BILIRUB SERPL-MCNC: 0.4 MG/DL — SIGNIFICANT CHANGE UP (ref 0.2–1.2)
BILIRUB SERPL-MCNC: 0.4 MG/DL — SIGNIFICANT CHANGE UP (ref 0.2–1.2)
BILIRUB UR-MCNC: NEGATIVE — SIGNIFICANT CHANGE UP
BILIRUB UR-MCNC: NEGATIVE — SIGNIFICANT CHANGE UP
BUN SERPL-MCNC: 18 MG/DL — SIGNIFICANT CHANGE UP (ref 7–23)
BUN SERPL-MCNC: 18 MG/DL — SIGNIFICANT CHANGE UP (ref 7–23)
CALCIUM SERPL-MCNC: 9.3 MG/DL — SIGNIFICANT CHANGE UP (ref 8.4–10.5)
CALCIUM SERPL-MCNC: 9.3 MG/DL — SIGNIFICANT CHANGE UP (ref 8.4–10.5)
CAST: 1 /LPF — SIGNIFICANT CHANGE UP (ref 0–4)
CAST: 1 /LPF — SIGNIFICANT CHANGE UP (ref 0–4)
CHLORIDE SERPL-SCNC: 102 MMOL/L — SIGNIFICANT CHANGE UP (ref 98–107)
CHLORIDE SERPL-SCNC: 102 MMOL/L — SIGNIFICANT CHANGE UP (ref 98–107)
CO2 SERPL-SCNC: 24 MMOL/L — SIGNIFICANT CHANGE UP (ref 22–31)
CO2 SERPL-SCNC: 24 MMOL/L — SIGNIFICANT CHANGE UP (ref 22–31)
COLOR SPEC: YELLOW — SIGNIFICANT CHANGE UP
COLOR SPEC: YELLOW — SIGNIFICANT CHANGE UP
CREAT SERPL-MCNC: 1.15 MG/DL — SIGNIFICANT CHANGE UP (ref 0.5–1.3)
CREAT SERPL-MCNC: 1.15 MG/DL — SIGNIFICANT CHANGE UP (ref 0.5–1.3)
CRP SERPL-MCNC: 6 MG/L — HIGH
CRP SERPL-MCNC: 6 MG/L — HIGH
DIFF PNL FLD: ABNORMAL
DIFF PNL FLD: ABNORMAL
EGFR: 52 ML/MIN/1.73M2 — LOW
EGFR: 52 ML/MIN/1.73M2 — LOW
EOSINOPHIL # BLD AUTO: 0.17 K/UL — SIGNIFICANT CHANGE UP (ref 0–0.5)
EOSINOPHIL # BLD AUTO: 0.17 K/UL — SIGNIFICANT CHANGE UP (ref 0–0.5)
EOSINOPHIL NFR BLD AUTO: 2.5 % — SIGNIFICANT CHANGE UP (ref 0–6)
EOSINOPHIL NFR BLD AUTO: 2.5 % — SIGNIFICANT CHANGE UP (ref 0–6)
ERYTHROCYTE [SEDIMENTATION RATE] IN BLOOD: 39 MM/HR — HIGH (ref 4–25)
ERYTHROCYTE [SEDIMENTATION RATE] IN BLOOD: 39 MM/HR — HIGH (ref 4–25)
ESTIMATED AVERAGE GLUCOSE: 117 — SIGNIFICANT CHANGE UP
ESTIMATED AVERAGE GLUCOSE: 117 — SIGNIFICANT CHANGE UP
GLUCOSE SERPL-MCNC: 91 MG/DL — SIGNIFICANT CHANGE UP (ref 70–99)
GLUCOSE SERPL-MCNC: 91 MG/DL — SIGNIFICANT CHANGE UP (ref 70–99)
GLUCOSE UR QL: NEGATIVE MG/DL — SIGNIFICANT CHANGE UP
GLUCOSE UR QL: NEGATIVE MG/DL — SIGNIFICANT CHANGE UP
HCT VFR BLD CALC: 36.6 % — SIGNIFICANT CHANGE UP (ref 34.5–45)
HCT VFR BLD CALC: 36.6 % — SIGNIFICANT CHANGE UP (ref 34.5–45)
HGB BLD-MCNC: 12.7 G/DL — SIGNIFICANT CHANGE UP (ref 11.5–15.5)
HGB BLD-MCNC: 12.7 G/DL — SIGNIFICANT CHANGE UP (ref 11.5–15.5)
IANC: 3.83 K/UL — SIGNIFICANT CHANGE UP (ref 1.8–7.4)
IANC: 3.83 K/UL — SIGNIFICANT CHANGE UP (ref 1.8–7.4)
IMM GRANULOCYTES NFR BLD AUTO: 0.1 % — SIGNIFICANT CHANGE UP (ref 0–0.9)
IMM GRANULOCYTES NFR BLD AUTO: 0.1 % — SIGNIFICANT CHANGE UP (ref 0–0.9)
KETONES UR-MCNC: NEGATIVE MG/DL — SIGNIFICANT CHANGE UP
KETONES UR-MCNC: NEGATIVE MG/DL — SIGNIFICANT CHANGE UP
LEUKOCYTE ESTERASE UR-ACNC: ABNORMAL
LEUKOCYTE ESTERASE UR-ACNC: ABNORMAL
LIDOCAIN IGE QN: 57 U/L — SIGNIFICANT CHANGE UP (ref 7–60)
LIDOCAIN IGE QN: 57 U/L — SIGNIFICANT CHANGE UP (ref 7–60)
LYMPHOCYTES # BLD AUTO: 2.52 K/UL — SIGNIFICANT CHANGE UP (ref 1–3.3)
LYMPHOCYTES # BLD AUTO: 2.52 K/UL — SIGNIFICANT CHANGE UP (ref 1–3.3)
LYMPHOCYTES # BLD AUTO: 36.5 % — SIGNIFICANT CHANGE UP (ref 13–44)
LYMPHOCYTES # BLD AUTO: 36.5 % — SIGNIFICANT CHANGE UP (ref 13–44)
MAGNESIUM SERPL-MCNC: 2 MG/DL — SIGNIFICANT CHANGE UP (ref 1.6–2.6)
MAGNESIUM SERPL-MCNC: 2 MG/DL — SIGNIFICANT CHANGE UP (ref 1.6–2.6)
MCHC RBC-ENTMCNC: 27.9 PG — SIGNIFICANT CHANGE UP (ref 27–34)
MCHC RBC-ENTMCNC: 27.9 PG — SIGNIFICANT CHANGE UP (ref 27–34)
MCHC RBC-ENTMCNC: 34.7 GM/DL — SIGNIFICANT CHANGE UP (ref 32–36)
MCHC RBC-ENTMCNC: 34.7 GM/DL — SIGNIFICANT CHANGE UP (ref 32–36)
MCV RBC AUTO: 80.3 FL — SIGNIFICANT CHANGE UP (ref 80–100)
MCV RBC AUTO: 80.3 FL — SIGNIFICANT CHANGE UP (ref 80–100)
MONOCYTES # BLD AUTO: 0.34 K/UL — SIGNIFICANT CHANGE UP (ref 0–0.9)
MONOCYTES # BLD AUTO: 0.34 K/UL — SIGNIFICANT CHANGE UP (ref 0–0.9)
MONOCYTES NFR BLD AUTO: 4.9 % — SIGNIFICANT CHANGE UP (ref 2–14)
MONOCYTES NFR BLD AUTO: 4.9 % — SIGNIFICANT CHANGE UP (ref 2–14)
NEUTROPHILS # BLD AUTO: 3.83 K/UL — SIGNIFICANT CHANGE UP (ref 1.8–7.4)
NEUTROPHILS # BLD AUTO: 3.83 K/UL — SIGNIFICANT CHANGE UP (ref 1.8–7.4)
NEUTROPHILS NFR BLD AUTO: 55.4 % — SIGNIFICANT CHANGE UP (ref 43–77)
NEUTROPHILS NFR BLD AUTO: 55.4 % — SIGNIFICANT CHANGE UP (ref 43–77)
NITRITE UR-MCNC: NEGATIVE — SIGNIFICANT CHANGE UP
NITRITE UR-MCNC: NEGATIVE — SIGNIFICANT CHANGE UP
NRBC # BLD: 0 /100 WBCS — SIGNIFICANT CHANGE UP (ref 0–0)
NRBC # BLD: 0 /100 WBCS — SIGNIFICANT CHANGE UP (ref 0–0)
NRBC # FLD: 0 K/UL — SIGNIFICANT CHANGE UP (ref 0–0)
NRBC # FLD: 0 K/UL — SIGNIFICANT CHANGE UP (ref 0–0)
PH UR: 6 — SIGNIFICANT CHANGE UP (ref 5–8)
PH UR: 6 — SIGNIFICANT CHANGE UP (ref 5–8)
PLATELET # BLD AUTO: 240 K/UL — SIGNIFICANT CHANGE UP (ref 150–400)
PLATELET # BLD AUTO: 240 K/UL — SIGNIFICANT CHANGE UP (ref 150–400)
POTASSIUM SERPL-MCNC: 3.9 MMOL/L — SIGNIFICANT CHANGE UP (ref 3.5–5.3)
POTASSIUM SERPL-MCNC: 3.9 MMOL/L — SIGNIFICANT CHANGE UP (ref 3.5–5.3)
POTASSIUM SERPL-SCNC: 3.9 MMOL/L — SIGNIFICANT CHANGE UP (ref 3.5–5.3)
POTASSIUM SERPL-SCNC: 3.9 MMOL/L — SIGNIFICANT CHANGE UP (ref 3.5–5.3)
PROT SERPL-MCNC: 7.9 G/DL — SIGNIFICANT CHANGE UP (ref 6–8.3)
PROT SERPL-MCNC: 7.9 G/DL — SIGNIFICANT CHANGE UP (ref 6–8.3)
PROT UR-MCNC: NEGATIVE MG/DL — SIGNIFICANT CHANGE UP
PROT UR-MCNC: NEGATIVE MG/DL — SIGNIFICANT CHANGE UP
RBC # BLD: 4.56 M/UL — SIGNIFICANT CHANGE UP (ref 3.8–5.2)
RBC # BLD: 4.56 M/UL — SIGNIFICANT CHANGE UP (ref 3.8–5.2)
RBC # FLD: 14.4 % — SIGNIFICANT CHANGE UP (ref 10.3–14.5)
RBC # FLD: 14.4 % — SIGNIFICANT CHANGE UP (ref 10.3–14.5)
RBC CASTS # UR COMP ASSIST: 1 /HPF — SIGNIFICANT CHANGE UP (ref 0–4)
RBC CASTS # UR COMP ASSIST: 1 /HPF — SIGNIFICANT CHANGE UP (ref 0–4)
SODIUM SERPL-SCNC: 136 MMOL/L — SIGNIFICANT CHANGE UP (ref 135–145)
SODIUM SERPL-SCNC: 136 MMOL/L — SIGNIFICANT CHANGE UP (ref 135–145)
SP GR SPEC: 1.01 — SIGNIFICANT CHANGE UP (ref 1–1.03)
SP GR SPEC: 1.01 — SIGNIFICANT CHANGE UP (ref 1–1.03)
SQUAMOUS # UR AUTO: 3 /HPF — SIGNIFICANT CHANGE UP (ref 0–5)
SQUAMOUS # UR AUTO: 3 /HPF — SIGNIFICANT CHANGE UP (ref 0–5)
TROPONIN T, HIGH SENSITIVITY RESULT: 7 NG/L — SIGNIFICANT CHANGE UP
TROPONIN T, HIGH SENSITIVITY RESULT: 7 NG/L — SIGNIFICANT CHANGE UP
TROPONIN T, HIGH SENSITIVITY RESULT: 8 NG/L — SIGNIFICANT CHANGE UP
TROPONIN T, HIGH SENSITIVITY RESULT: 8 NG/L — SIGNIFICANT CHANGE UP
UROBILINOGEN FLD QL: 0.2 MG/DL — SIGNIFICANT CHANGE UP (ref 0.2–1)
UROBILINOGEN FLD QL: 0.2 MG/DL — SIGNIFICANT CHANGE UP (ref 0.2–1)
WBC # BLD: 6.91 K/UL — SIGNIFICANT CHANGE UP (ref 3.8–10.5)
WBC # BLD: 6.91 K/UL — SIGNIFICANT CHANGE UP (ref 3.8–10.5)
WBC # FLD AUTO: 6.91 K/UL — SIGNIFICANT CHANGE UP (ref 3.8–10.5)
WBC # FLD AUTO: 6.91 K/UL — SIGNIFICANT CHANGE UP (ref 3.8–10.5)
WBC UR QL: 159 /HPF — HIGH (ref 0–5)
WBC UR QL: 159 /HPF — HIGH (ref 0–5)

## 2023-10-24 PROCEDURE — 93306 TTE W/DOPPLER COMPLETE: CPT | Mod: 26

## 2023-10-24 PROCEDURE — 93016 CV STRESS TEST SUPVJ ONLY: CPT | Mod: GC,MA

## 2023-10-24 PROCEDURE — 93010 ELECTROCARDIOGRAM REPORT: CPT

## 2023-10-24 PROCEDURE — 78451 HT MUSCLE IMAGE SPECT SING: CPT | Mod: 26,MA

## 2023-10-24 PROCEDURE — 71046 X-RAY EXAM CHEST 2 VIEWS: CPT | Mod: 26

## 2023-10-24 PROCEDURE — 99236 HOSP IP/OBS SAME DATE HI 85: CPT | Mod: 25

## 2023-10-24 PROCEDURE — 93018 CV STRESS TEST I&R ONLY: CPT | Mod: GC,MA

## 2023-10-24 RX ORDER — DEXTROSE 50 % IN WATER 50 %
25 SYRINGE (ML) INTRAVENOUS ONCE
Refills: 0 | Status: DISCONTINUED | OUTPATIENT
Start: 2023-10-24 | End: 2023-10-27

## 2023-10-24 RX ORDER — SODIUM CHLORIDE 9 MG/ML
1000 INJECTION INTRAMUSCULAR; INTRAVENOUS; SUBCUTANEOUS ONCE
Refills: 0 | Status: COMPLETED | OUTPATIENT
Start: 2023-10-24 | End: 2023-10-24

## 2023-10-24 RX ORDER — INSULIN LISPRO 100/ML
VIAL (ML) SUBCUTANEOUS
Refills: 0 | Status: DISCONTINUED | OUTPATIENT
Start: 2023-10-24 | End: 2023-10-27

## 2023-10-24 RX ORDER — DEXTROSE 50 % IN WATER 50 %
15 SYRINGE (ML) INTRAVENOUS ONCE
Refills: 0 | Status: DISCONTINUED | OUTPATIENT
Start: 2023-10-24 | End: 2023-10-27

## 2023-10-24 RX ORDER — CEPHALEXIN 500 MG
500 CAPSULE ORAL ONCE
Refills: 0 | Status: COMPLETED | OUTPATIENT
Start: 2023-10-24 | End: 2023-10-24

## 2023-10-24 RX ORDER — SODIUM CHLORIDE 9 MG/ML
1000 INJECTION, SOLUTION INTRAVENOUS
Refills: 0 | Status: DISCONTINUED | OUTPATIENT
Start: 2023-10-24 | End: 2023-10-27

## 2023-10-24 RX ORDER — METOCLOPRAMIDE HCL 10 MG
10 TABLET ORAL ONCE
Refills: 0 | Status: COMPLETED | OUTPATIENT
Start: 2023-10-24 | End: 2023-10-24

## 2023-10-24 RX ORDER — LOSARTAN POTASSIUM 100 MG/1
100 TABLET, FILM COATED ORAL DAILY
Refills: 0 | Status: DISCONTINUED | OUTPATIENT
Start: 2023-10-24 | End: 2023-10-27

## 2023-10-24 RX ORDER — CEPHALEXIN 500 MG
1 CAPSULE ORAL
Qty: 28 | Refills: 0
Start: 2023-10-24 | End: 2023-10-30

## 2023-10-24 RX ORDER — DEXTROSE 50 % IN WATER 50 %
12.5 SYRINGE (ML) INTRAVENOUS ONCE
Refills: 0 | Status: DISCONTINUED | OUTPATIENT
Start: 2023-10-24 | End: 2023-10-27

## 2023-10-24 RX ORDER — GLUCAGON INJECTION, SOLUTION 0.5 MG/.1ML
1 INJECTION, SOLUTION SUBCUTANEOUS ONCE
Refills: 0 | Status: DISCONTINUED | OUTPATIENT
Start: 2023-10-24 | End: 2023-10-27

## 2023-10-24 RX ORDER — ACETAMINOPHEN 500 MG
1000 TABLET ORAL ONCE
Refills: 0 | Status: COMPLETED | OUTPATIENT
Start: 2023-10-24 | End: 2023-10-24

## 2023-10-24 RX ADMIN — LOSARTAN POTASSIUM 100 MILLIGRAM(S): 100 TABLET, FILM COATED ORAL at 11:52

## 2023-10-24 RX ADMIN — SODIUM CHLORIDE 1000 MILLILITER(S): 9 INJECTION INTRAMUSCULAR; INTRAVENOUS; SUBCUTANEOUS at 04:52

## 2023-10-24 RX ADMIN — Medication 400 MILLIGRAM(S): at 04:52

## 2023-10-24 RX ADMIN — Medication 500 MILLIGRAM(S): at 14:25

## 2023-10-24 RX ADMIN — Medication 10 MILLIGRAM(S): at 04:52

## 2023-10-24 NOTE — ED CDU PROVIDER DISPOSITION NOTE - PATIENT PORTAL LINK FT
You can access the FollowMyHealth Patient Portal offered by [facility] by registering at the following website: [email or web address]. By joining [employer]’s FollowMyHealth portal, you will also be able to view your health information using other applications (apps) compatible with our system.

## 2023-10-24 NOTE — ED CDU PROVIDER DISPOSITION NOTE - NS ED ATTENDING STATEMENT MOD
This was a shared visit with the TOMASA. I reviewed and verified the documentation and independently performed the documented: Attending with

## 2023-10-24 NOTE — ED ADULT NURSE NOTE - NSFALLUNIVINTERV_ED_ALL_ED
Bed/Stretcher in lowest position, wheels locked, appropriate side rails in place/Call bell, personal items and telephone in reach/Instruct patient to call for assistance before getting out of bed/chair/stretcher/Non-slip footwear applied when patient is off stretcher/[place] to call system/Physically safe environment - no spills, clutter or unnecessary equipment/Purposeful proactive rounding/Room/bathroom lighting operational, light cord in reach

## 2023-10-24 NOTE — ED CDU PROVIDER INITIAL DAY NOTE - CLINICAL SUMMARY MEDICAL DECISION MAKING FREE TEXT BOX
68-year-old female history of hypertension, diabetes, hyperlipidemia, presents with L sided chest pain which radiates to the L arm. Plan is CDU placement for echo and stress to eval for a cardiogenic etiology of the pt's symptoms. Will monitor on telemetry, pt's pain has improved.

## 2023-10-24 NOTE — ED PROVIDER NOTE - ATTENDING CONTRIBUTION TO CARE
DR. WILSON, ATTENDING MD-  I performed a face to face bedside interview with the patient regarding history of present illness, review of symptoms and past medical history. I completed an independent physical exam.  I have discussed the patient's plan of care with the resident.   Documentation as above in the note.    69 y/o female h/o htn dm2 with chest tightness rad to lue and ha x2 days.  HA was gradual onset, not maximal at onset, similar to ha in past.  Perform acs w/u.  Obtain ekg cbc cmp trop cxr give asa likely obs for continued acs w/u.

## 2023-10-24 NOTE — ED CDU PROVIDER INITIAL DAY NOTE - NS ED ATTENDING STATEMENT MOD
This was a shared visit with the TOMASA. I reviewed and verified the documentation and independently performed the documented:

## 2023-10-24 NOTE — ED CDU PROVIDER INITIAL DAY NOTE - PROGRESS NOTE DETAILS
ALESIA Davis: 68-year-old female with a history of hypertension, diabetes, hyperlipidemia presented to the emergency department complaining of left-sided chest pain.  Patient placed in CDU for cardiac monitoring, echo, stress test.  Results reviewed no emergent findings.  Patient with positive UA we will treat with Keflex.  Patient feels well, discharge and results reviewed with patient.

## 2023-10-24 NOTE — ED CDU PROVIDER DISPOSITION NOTE - NSFOLLOWUPINSTRUCTIONS_ED_ALL_ED_FT
Follow up with your Doctor in 1-2 days.    Follow up with cardiology in 1-2 days.(see attached list)  Take Keflex 500mg orally 4 x a day x 7 days.  Return to the ER for any persistent/worsening or new symptoms, chest pain, shortness of breath, palpitations, dizziness or any concerning symptoms.

## 2023-10-24 NOTE — ED PROVIDER NOTE - CLINICAL SUMMARY MEDICAL DECISION MAKING FREE TEXT BOX
68-year-old female history of hypertension, diabetes, hyperlipidemia, presents with 2 days of frontal headache worse in temporal region, now with left lateral chest pain radiating to left arm.  Patient states headaches improved with Tylenol.  Endorses subjective vision blurry vision in left eye.  Denies fevers, neck stiffness, jaw claudication, shortness of breath, abdominal pain, nausea, vomiting, dysuria, hematuria.  Patient does not currently have a cardiologist.  Vision 20/70 bilaterally (wears glasses at baseline), no temporal tenderness to palpation, full range of motion of neck, lungs clear to auscultation, no other neuro deficits.  Possible tension headache, possible ACS. Low concern for temporal. Will obtain labs, imaging, discussed CDU for echo and stress pending work-up.

## 2023-10-24 NOTE — ED ADULT NURSE NOTE - OBJECTIVE STATEMENT
Received pt in room int10c. A&Ox4, ambulatory at baseline, hx HTN, anxiety, DM2, c/o chest tightness since 2100 and headache, given 324 ASA en route, pt endorses improvement from arrival symptoms. VS as noted. RR even and unlabored. 20g placed in right AC. Labs sent. Medication given. Awaiting further orders from provider.

## 2023-10-24 NOTE — ED ADULT NURSE REASSESSMENT NOTE - NS ED NURSE REASSESS COMMENT FT1
Hand off report given CDU RN No signs of acute distress noted. Patient stable for transport Tamir KUMAR

## 2023-10-24 NOTE — CHART NOTE - NSCHARTNOTEFT_GEN_A_CORE
was informed by provider that patient is discharged and needs assistance with getting a taxi home.  met with patient at bedside, and she confirmed her home address, [address] [place] [address].  booked a taxi through [employer],  at 2:50pm and invoice number [id number]. No further social work intervention needed.

## 2023-10-24 NOTE — ED CDU PROVIDER INITIAL DAY NOTE - OBJECTIVE STATEMENT
68-year-old female history of hypertension, diabetes, hyperlipidemia, presents with L sided chest pain with radiation to the L arm which she states began at 2100 last night while awake. Pt states the pain is still present while in the ED but improving. Pt states the sx improved with ASA from EMS. Pt states she is a never smoker, denies EtOH use and denies FH of heart issues. Pt was having a L sided HA for the past 2 days as well, denies any other sx or acute complaints.

## 2023-10-24 NOTE — ED PROVIDER NOTE - PHYSICAL EXAMINATION
GEN: NAD. A&Ox3. Non-toxic appearing.  HEENT: normocephalic, atraumatic, EOMI, PERRL, no scleral icterus, no conjuntival pallor, moist MM  CARDIAC: RRR, S1, S2, no murmur. Radial pulses present and symmetric b/l  PULM: CTA B/L no wheeze, rhonchi, rales.   ABD: soft NT, ND, no rebound no guarding  MSK: Moving all extremities, no edema. 5/5 strength and full ROM in all extremities.     NEURO: gait normal, no focal neurological deficits, CN 2-12 grossly intact  SKIN: warm, dry, no rash.

## 2023-10-24 NOTE — ED CDU PROVIDER INITIAL DAY NOTE - ATTENDING APP SHARED VISIT CONTRIBUTION OF CARE
CDU MD WILSON:  I performed a face to face bedside interview with patient regarding history of present illness, review of symptoms and past medical history. I completed an independent physical exam.  I have discussed patient's plan of care with PA.   I agree with note as stated above, having amended the EMR as needed to reflect my findings. I have discussed the assessment and plan of care.  This includes during the time I functioned as the attending physician for this patient.    67 y/o female h/o htn dm2 with chest pain and ha.  CDU for stress echo cards eval.

## 2023-10-26 NOTE — ED POST DISCHARGE NOTE - ADDITIONAL DOCUMENTATION
pt feels better and endorsed to her regarding preliminary urine culture results- pt is taking antibiotics and told pt she needs to make urgent followup with her pmd

## 2023-10-27 LAB
-  AMIKACIN: SIGNIFICANT CHANGE UP
-  AMOXICILLIN/CLAVULANIC ACID: SIGNIFICANT CHANGE UP
-  AMPICILLIN/SULBACTAM: SIGNIFICANT CHANGE UP
-  AMPICILLIN: SIGNIFICANT CHANGE UP
-  AZTREONAM: SIGNIFICANT CHANGE UP
-  CEFAZOLIN: SIGNIFICANT CHANGE UP
-  CEFEPIME: SIGNIFICANT CHANGE UP
-  CEFOXITIN: SIGNIFICANT CHANGE UP
-  CEFTRIAXONE: SIGNIFICANT CHANGE UP
-  CEFUROXIME: SIGNIFICANT CHANGE UP
-  CIPROFLOXACIN: SIGNIFICANT CHANGE UP
-  ERTAPENEM: SIGNIFICANT CHANGE UP
-  GENTAMICIN: SIGNIFICANT CHANGE UP
-  IMIPENEM: SIGNIFICANT CHANGE UP
-  LEVOFLOXACIN: SIGNIFICANT CHANGE UP
-  MEROPENEM: SIGNIFICANT CHANGE UP
-  NITROFURANTOIN: SIGNIFICANT CHANGE UP
-  PIPERACILLIN/TAZOBACTAM: SIGNIFICANT CHANGE UP
-  TOBRAMYCIN: SIGNIFICANT CHANGE UP
-  TRIMETHOPRIM/SULFAMETHOXAZOLE: SIGNIFICANT CHANGE UP
CULTURE RESULTS: ABNORMAL
CULTURE RESULTS: ABNORMAL
METHOD TYPE: SIGNIFICANT CHANGE UP
ORGANISM # SPEC MICROSCOPIC CNT: ABNORMAL
SPECIMEN SOURCE: SIGNIFICANT CHANGE UP
SPECIMEN SOURCE: SIGNIFICANT CHANGE UP

## 2023-10-30 ENCOUNTER — APPOINTMENT (OUTPATIENT)
Dept: INTERNAL MEDICINE | Facility: CLINIC | Age: 68
End: 2023-10-30
Payer: MEDICARE

## 2023-10-30 VITALS
WEIGHT: 195 LBS | DIASTOLIC BLOOD PRESSURE: 80 MMHG | HEIGHT: 68 IN | HEART RATE: 74 BPM | TEMPERATURE: 97.6 F | BODY MASS INDEX: 29.55 KG/M2 | SYSTOLIC BLOOD PRESSURE: 160 MMHG | OXYGEN SATURATION: 94 %

## 2023-10-30 PROCEDURE — 99214 OFFICE O/P EST MOD 30 MIN: CPT

## 2023-10-31 DIAGNOSIS — R07.9 CHEST PAIN, UNSPECIFIED: ICD-10-CM

## 2023-11-06 ENCOUNTER — APPOINTMENT (OUTPATIENT)
Dept: CARDIOLOGY | Facility: CLINIC | Age: 68
End: 2023-11-06

## 2023-11-07 ENCOUNTER — APPOINTMENT (OUTPATIENT)
Dept: GASTROENTEROLOGY | Facility: CLINIC | Age: 68
End: 2023-11-07
Payer: MEDICARE

## 2023-11-07 VITALS
OXYGEN SATURATION: 96 % | SYSTOLIC BLOOD PRESSURE: 167 MMHG | WEIGHT: 191 LBS | HEIGHT: 68 IN | HEART RATE: 63 BPM | BODY MASS INDEX: 28.95 KG/M2 | DIASTOLIC BLOOD PRESSURE: 84 MMHG | TEMPERATURE: 97.1 F

## 2023-11-07 DIAGNOSIS — R10.11 RIGHT UPPER QUADRANT PAIN: ICD-10-CM

## 2023-11-07 PROCEDURE — 99213 OFFICE O/P EST LOW 20 MIN: CPT

## 2023-11-07 RX ORDER — POLYETHYLENE GLYCOL 3350, SODIUM CHLORIDE, SODIUM BICARBONATE AND POTASSIUM CHLORIDE WITH LEMON FLAVOR 420; 11.2; 5.72; 1.48 G/4L; G/4L; G/4L; G/4L
420 POWDER, FOR SOLUTION ORAL
Qty: 4000 | Refills: 0 | Status: DISCONTINUED | COMMUNITY
Start: 2023-10-10 | End: 2023-11-07

## 2023-12-15 NOTE — HISTORY OF PRESENT ILLNESS
[FreeTextEntry1] : Follow up, BP check [de-identified] : Ms. HALL is a 68 year old female who presents to the office for follow up, BP check.  BPs are running higher in the mornings (SBP 150s). They improve during the day. She will move her losartan to the evening, and take the other 2 BP medications in the AM  Losartan 100 qd Metoprolol  qd Nifedipine ER 60 160/80 Had stress test and echo in hospital - WNL  Increase nifedipine to 90 ER  change losartan to evening dosing left  recent CT abdomen, colonoscopy normal Had recent sclerotherapy - Friday Had sonogram of right leg this AM

## 2024-02-27 NOTE — PROGRESS NOTE ADULT - PROBLEM SELECTOR PLAN 3
dysarthria according to ED, currently speech wnl and no focal deficits, possible related to sepsis  - c/w asa and statin   - c/w tele, EKG NSR  - CTH neg, MRI brain negative for infarct  - CTA H/N: no stenosis   - TTE was performed and showed St 1 DD, EF 70%  - passed dysphagia screen, c/w diet No
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
## 2024-03-22 ENCOUNTER — APPOINTMENT (OUTPATIENT)
Dept: INTERNAL MEDICINE | Facility: CLINIC | Age: 69
End: 2024-03-22
Payer: MEDICARE

## 2024-03-22 VITALS — SYSTOLIC BLOOD PRESSURE: 120 MMHG | DIASTOLIC BLOOD PRESSURE: 62 MMHG

## 2024-03-22 VITALS
TEMPERATURE: 98.6 F | OXYGEN SATURATION: 97 % | WEIGHT: 188 LBS | HEART RATE: 65 BPM | SYSTOLIC BLOOD PRESSURE: 140 MMHG | HEIGHT: 68 IN | DIASTOLIC BLOOD PRESSURE: 70 MMHG | BODY MASS INDEX: 28.49 KG/M2

## 2024-03-22 DIAGNOSIS — N18.9 CHRONIC KIDNEY DISEASE, UNSPECIFIED: ICD-10-CM

## 2024-03-22 DIAGNOSIS — I10 ESSENTIAL (PRIMARY) HYPERTENSION: ICD-10-CM

## 2024-03-22 PROCEDURE — 99214 OFFICE O/P EST MOD 30 MIN: CPT

## 2024-03-22 RX ORDER — FINERENONE 10 MG/1
10 TABLET, FILM COATED ORAL
Refills: 0 | Status: COMPLETED | COMMUNITY
End: 2024-03-22
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
## 2024-03-22 NOTE — PHYSICAL EXAM
[Normal] : normal rate, regular rhythm, normal S1 and S2 and no murmur heard [No Edema] : there was no peripheral edema [Non Tender] : non-tender [Soft] : abdomen soft [Non-distended] : non-distended [No Masses] : no abdominal mass palpated [Normal Bowel Sounds] : normal bowel sounds

## 2024-03-22 NOTE — HISTORY OF PRESENT ILLNESS
[de-identified] : Patient for follow-up of HTN.  She has Type 2 DM managed by Endocrine (Dr. Mitul Paris).  Patient is on losartan 100mg daily, losartan 100mg daily, and nifedipine ER 90mg daily.  She is tolerating the medications well.  No dizziness, lightheadedness.  Patient will also need [facility] ISHAAN Form 4354 completed (physician order for personal care).

## 2024-05-16 ENCOUNTER — APPOINTMENT (OUTPATIENT)
Dept: ORTHOPEDIC SURGERY | Facility: CLINIC | Age: 69
End: 2024-05-16
Payer: MEDICARE

## 2024-05-16 VITALS — WEIGHT: 192 LBS | HEIGHT: 68 IN | BODY MASS INDEX: 29.1 KG/M2

## 2024-05-16 DIAGNOSIS — M25.562 PAIN IN LEFT KNEE: ICD-10-CM

## 2024-05-16 PROCEDURE — 73564 X-RAY EXAM KNEE 4 OR MORE: CPT | Mod: LT

## 2024-05-16 PROCEDURE — 99213 OFFICE O/P EST LOW 20 MIN: CPT | Mod: 25

## 2024-05-17 ENCOUNTER — LABORATORY RESULT (OUTPATIENT)
Age: 69
End: 2024-05-17

## 2024-05-21 ENCOUNTER — NON-APPOINTMENT (OUTPATIENT)
Age: 69
End: 2024-05-21

## 2024-05-21 ENCOUNTER — APPOINTMENT (OUTPATIENT)
Dept: INTERNAL MEDICINE | Facility: CLINIC | Age: 69
End: 2024-05-21
Payer: MEDICARE

## 2024-05-21 VITALS
TEMPERATURE: 98.7 F | OXYGEN SATURATION: 98 % | DIASTOLIC BLOOD PRESSURE: 96 MMHG | HEART RATE: 80 BPM | BODY MASS INDEX: 29.25 KG/M2 | HEIGHT: 68 IN | WEIGHT: 193 LBS | SYSTOLIC BLOOD PRESSURE: 128 MMHG

## 2024-05-21 DIAGNOSIS — T84.84XA PAIN DUE TO INTERNAL ORTHOPEDIC PROSTHETIC DEVICES, IMPLANTS AND GRAFTS, INITIAL ENCOUNTER: ICD-10-CM

## 2024-05-21 DIAGNOSIS — E78.5 HYPERLIPIDEMIA, UNSPECIFIED: ICD-10-CM

## 2024-05-21 DIAGNOSIS — Z00.00 ENCOUNTER FOR GENERAL ADULT MEDICAL EXAMINATION W/OUT ABNORMAL FINDINGS: ICD-10-CM

## 2024-05-21 DIAGNOSIS — E11.9 TYPE 2 DIABETES MELLITUS W/OUT COMPLICATIONS: ICD-10-CM

## 2024-05-21 DIAGNOSIS — Z96.649 PAIN DUE TO INTERNAL ORTHOPEDIC PROSTHETIC DEVICES, IMPLANTS AND GRAFTS, INITIAL ENCOUNTER: ICD-10-CM

## 2024-05-21 DIAGNOSIS — J30.9 ALLERGIC RHINITIS, UNSPECIFIED: ICD-10-CM

## 2024-05-21 PROCEDURE — G0439: CPT

## 2024-05-21 PROCEDURE — 93000 ELECTROCARDIOGRAM COMPLETE: CPT

## 2024-05-21 RX ORDER — LOSARTAN POTASSIUM 100 MG/1
100 TABLET, FILM COATED ORAL DAILY
Qty: 90 | Refills: 3 | Status: ACTIVE | COMMUNITY
Start: 1900-01-01 | End: 1900-01-01

## 2024-05-21 RX ORDER — NIFEDIPINE 90 MG/1
90 TABLET, EXTENDED RELEASE ORAL DAILY
Qty: 90 | Refills: 3 | Status: ACTIVE | COMMUNITY
Start: 1900-01-01 | End: 1900-01-01

## 2024-05-21 RX ORDER — FLUTICASONE PROPIONATE 50 UG/1
50 SPRAY, METERED NASAL DAILY
Qty: 1 | Refills: 3 | Status: ACTIVE | COMMUNITY
Start: 2023-05-22 | End: 1900-01-01

## 2024-05-21 RX ORDER — ATORVASTATIN CALCIUM 40 MG/1
40 TABLET, FILM COATED ORAL DAILY
Qty: 90 | Refills: 3 | Status: ACTIVE | COMMUNITY
Start: 1900-01-01 | End: 1900-01-01

## 2024-05-21 RX ORDER — ESCITALOPRAM OXALATE 10 MG/1
10 TABLET ORAL DAILY
Refills: 0 | Status: ACTIVE | COMMUNITY

## 2024-05-21 RX ORDER — METOPROLOL SUCCINATE 100 MG/1
100 TABLET, EXTENDED RELEASE ORAL
Qty: 90 | Refills: 3 | Status: ACTIVE | COMMUNITY
Start: 1900-01-01 | End: 1900-01-01

## 2024-05-21 NOTE — PHYSICAL EXAM
[No Acute Distress] : no acute distress [Well Nourished] : well nourished [Well Developed] : well developed [Well-Appearing] : well-appearing [Normal Voice/Communication] : normal voice/communication [Normal Sclera/Conjunctiva] : normal sclera/conjunctiva [PERRL] : pupils equal round and reactive to light [EOMI] : extraocular movements intact [Normal Outer Ear/Nose] : the outer ears and nose were normal in appearance [Normal Oropharynx] : the oropharynx was normal [Normal TMs] : both tympanic membranes were normal [No JVD] : no jugular venous distention [No Lymphadenopathy] : no lymphadenopathy [Supple] : supple [Thyroid Normal, No Nodules] : the thyroid was normal and there were no nodules present [No Respiratory Distress] : no respiratory distress  [No Accessory Muscle Use] : no accessory muscle use [Clear to Auscultation] : lungs were clear to auscultation bilaterally [Normal Rate] : normal rate  [Regular Rhythm] : with a regular rhythm [Normal S1, S2] : normal S1 and S2 [No Murmur] : no murmur heard [No Carotid Bruits] : no carotid bruits [No Abdominal Bruit] : a ~M bruit was not heard ~T in the abdomen [No Varicosities] : no varicosities [Pedal Pulses Present] : the pedal pulses are present [No Edema] : there was no peripheral edema [No Palpable Aorta] : no palpable aorta [No Extremity Clubbing/Cyanosis] : no extremity clubbing/cyanosis [Normal Appearance] : normal in appearance [No Nipple Discharge] : no nipple discharge [No Axillary Lymphadenopathy] : no axillary lymphadenopathy [Soft] : abdomen soft [Non Tender] : non-tender [Non-distended] : non-distended [No Masses] : no abdominal mass palpated [No HSM] : no HSM [Normal Bowel Sounds] : normal bowel sounds [No Hernias] : no hernias [Normal Posterior Cervical Nodes] : no posterior cervical lymphadenopathy [Normal Anterior Cervical Nodes] : no anterior cervical lymphadenopathy [No CVA Tenderness] : no CVA  tenderness [No Spinal Tenderness] : no spinal tenderness [No Joint Swelling] : no joint swelling [Grossly Normal Strength/Tone] : grossly normal strength/tone [No Rash] : no rash [Coordination Grossly Intact] : coordination grossly intact [Normal Gait] : normal gait [Deep Tendon Reflexes (DTR)] : deep tendon reflexes were 2+ and symmetric [Normal Affect] : the affect was normal [Normal Insight/Judgement] : insight and judgment were intact [Comprehensive Foot Exam Normal] : Right and left foot were examined and both feet are normal. No ulcers in either foot. Toes are normal and with full ROM.  Normal tactile sensation with monofilament testing throughout both feet [de-identified] : 4/5 strength RLE.

## 2024-05-21 NOTE — HEALTH RISK ASSESSMENT
[Fair] :  ~his/her~ mood as fair [Yes] : Yes [Monthly or less (1 pt)] : Monthly or less (1 point) [No] : In the past 12 months have you used drugs other than those required for medical reasons? No [No falls in past year] : Patient reported no falls in the past year [Patient reported mammogram was normal] : Patient reported mammogram was normal [Patient reported bone density results were normal] : Patient reported bone density results were normal [Patient reported colonoscopy was normal] : Patient reported colonoscopy was normal [With Family] : lives with family [Feels Safe at Home] : Feels safe at home [Smoke Detector] : smoke detector [Carbon Monoxide Detector] : carbon monoxide detector [Seat Belt] :  uses seat belt [Sunscreen] : uses sunscreen [Patient refused screening] : Patient refused screening [Change in mental status noted] : No change in mental status noted [Reports changes in hearing] : Reports no changes in hearing [Reports changes in dental health] : Reports no changes in dental health [MammogramDate] : 09/23 [MammogramComments] : She has a marker on the R breast.  [facility].  [phone]. [PapSmearComments] : Requests Gyn in [place]. [BoneDensityDate] : 2022 [ColonoscopyDate] : 10/23 [ColonoscopyComments] : Dr. Patel.  10-year follow-up. [HIVDate] : 01/23 [HIVComments] : Negative [HepatitisCDate] : 01/23 [HepatitisCComments] : Negative [de-identified] : Staying with daughter. [de-identified] : Needs help getting dressed, bathing, preparing food.  Needs help with transportation. [de-identified] : Was told of some DM retinopathy.  Seen in 2022.  Has an appointment - ? name. [de-identified] : Going regularly. [FreeTextEntry4] : Will discuss in a future visit.

## 2024-05-21 NOTE — ASSESSMENT
[FreeTextEntry1] : (1) HCM - discussed diet, exercise, weight maintenance.  Labs drawn ahead of visit and reviewed with patient.   HIV/Hepatitis C screening negative Jan 2023.  Patient reports an influenza vaccination this season.  She reports 3 doses of a COVID-19 vaccine and declines boosters.  She reports 2 doses of Shingrix and a Pneumovax and will attempt to obtain dates.  If unable to obtain, will consider giving Prevnar-20 in the next few years.  Tdap UTD 5/22/2023.  Referral given for Gyn.  Patient to continue screening mammogram every 1-2 years, and bone density every 2 years.  Screening colonoscopy 10/18/2023 with Dr. Patel was negative and 10-year follow-up was recommended.  Audiology evaluation normal Feb 2023.  Continue eye exams as directed.  Patient given Health Care Proxy information today.  (2) Type 2 DM - patient seeing Endocrine and is on Tradjenta.  She is on a statin and an ARB.  She has seen an ophthalmologist and was told of mild retinopathy.  She will follow-up as directed.  Referral previously given for diabetic educator.  (3) HTN - BP adequately controlled, continue present regimen.  Renin, aldosterone added to labs (she had a script from a nephrologist ordering this).  (4) Ortho follow-up as directed.  Patient previously given a letter to use [facility].  She was asked to have ortho records forwarded.  (5) Sleep referral previously given (patient says she has been asked to have this done by prior MDs).  She declines to follow-up for this.
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
## 2024-05-26 PROBLEM — M25.562 LEFT KNEE PAIN: Status: ACTIVE | Noted: 2023-08-25
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
## 2024-05-26 NOTE — PHYSICAL EXAM
[de-identified] : Constitutional: 67 year old female, alert and oriented, cooperative, in no acute distress.  HEENT  NC/AT.  Appearance: symmetric  Neck/Back Straight without deformity or instability.  Good ROM.  Chest/Respiratory  Respiratory effort: no intercostal retractions or use of accessory muscles. Nonlabored Breathing  Mental Status:  Judgment, insight: intact Orientation: oriented to time, place, and person  Neurological: Sensory and Motor are grossly intact throughout  Left Knee  Inspection:     Skin intact, no rashes or lesions     No Effusion     Non-tender to palpation over tibial tubercle, patella, lateral joint line, and pes insertion.     Tenderness over the medial joint line  Range of Motion: 	Extension - 0 degrees 	Flexion - 120 degrees 	Extensor lag: None  Stability:      Demonstrates no Varus or Valgus instability      Negative Anterior or Posterior drawer.      Negative Lachman's  Patella: stable, tracks well.   Neurologic Exam     Motor intact including 5/5 Extensor Hallucis Longus, 5/5 Flexor Hallucis Longus, 5/5 Tibialis Anterior and 5/5 Gastrocnemius     Sensation Intact to Light Touch including Saphenous, Sural, Superficial Peroneal, Deep Peroneal, Tibial nerve distributions  Vascular Exam     Foot is warm and well perfused with 2+ Dorsalis Pedis Pulse  There is Right lumbar paraspinal muscle tenderness. [de-identified] : XRay: XRays of the Left  Knee (4 Views) taken on 8/25/2024. XRays demonstrate joint space narrowing in the medial and patellofemoral compartments, consistent with osteoarthritis, KL Grade: 2. (my personal interpretation)    XRay:  XRays of the Pelvis (1 View) and Right Hip (2 Views) taken on 7/14/2023. XRays demonstrate a Right Total Hip Arthroplasty.  There is some acetabular profunda. There are 3 acetabular screws in place.  There is heterotopic ossification around the hip capsule and proximal femur/femoral stem.  (my personal interpretation)  XRay:  XRays of the Lumbar Spine (2 Views) taken on 2/24/2023. XRays demonstrate no obvious fracture or dislocation.  There is a small spondylolisthesis on L4-5.  (my personal interpretation).    EXAM: [id number] - CT 3D RECONSTRUCT WO [facility]KSTATON  - ORDERED BY: SUSSY MCCULLOUGH  EXAM: [id number] - CT HIP ONLY RT  - ORDERED BY: SUSSY MCCULLOUGH   PROCEDURE DATE:  07/25/2023    INTERPRETATION:  CT OF THE RIGHT HIP  CLINICAL INFORMATION: Pain, status post total arthroplasty 1 year prior.  COMPARISON: Postoperative fluoroscopic guided aspiration dated 3/3/2023. Preoperative CT abdomen and pelvis dated 6/2/2021.  TECHNIQUE: Axial CT images were obtained of the right hip with coronal and sagittal reconstructions. 3-D volume rendered reformats were also provided. No intravenous contrast was administered.  FINDINGS:  Osseous: Status post remote noncemented right hip total arthroplasty with 3 acetabular augmentation screws. Protrusio of the acetabular component. No osteolysis, definite subsidence, or displaced periprosthetic fracture. Michelle grade 2/3 heterotopic ossification most prominently involving the anterior iliofemoral capsular ligament. Mineralization otherwise within normal limits. No aggressive osseous neoplasm  Soft tissues: No definite postsurgical effusion or periprosthetic fluid collection. No sizable hyperattenuating hematoma. No pelvic free fluid.  IMPRESSION:  1.  Status post remote right hip total arthroplasty with nonspecific protrusio of the acetabular component, both without convincing CT evidence for hardware loosening or periprosthetic fracture. No definite postsurgical effusion. 2.  [place]hend grade 2/3 right hip periprosthetic heterotopic ossification, most prominently involving the anterior iliofemoral capsular ligament.  --- End of Report ---  ALBERTO ESPITIA MD; Attending Radiologist This document has been electronically signed. Jul 27 2023  2:03PM

## 2024-05-26 NOTE — DISCUSSION/SUMMARY
[de-identified] : Shelbie Hamilton is a 68-year-old female who presents to the office for evaluation of her left knee pain.  Patient has been experiencing left knee pain throughout the knee.  X-rays showed left knee osteoarthritis.  Examination showed tenderness over the knee, but otherwise good knee range of motion.  Discussed with the patient the examination and imaging findings.  Discussed with patient that her pain is likely secondary to her knee osteoarthritis.  Discussed with the patient management of her pain at this time, including physical therapy, pain control, and injections.  Patient is planned for lab work tomorrow would like to defer injections at this time.  She was given prescription for physical therapy.  Patient will take Tylenol as needed for pain control.  Patient will follow-up as needed for reevaluation and management.  Patient understanding and in agreement with the plan.  All questions answered.  Plan: -Physical therapy -Tylenol as needed for pain control -Follow-up as needed for reevaluation and management

## 2024-05-30 ENCOUNTER — APPOINTMENT (OUTPATIENT)
Dept: OBGYN | Facility: CLINIC | Age: 69
End: 2024-05-30

## 2024-05-30 VITALS
WEIGHT: 194 LBS | BODY MASS INDEX: 29.4 KG/M2 | HEIGHT: 68 IN | DIASTOLIC BLOOD PRESSURE: 77 MMHG | SYSTOLIC BLOOD PRESSURE: 149 MMHG

## 2024-05-30 DIAGNOSIS — Z01.419 ENCOUNTER FOR GYNECOLOGICAL EXAMINATION (GENERAL) (ROUTINE) W/OUT ABNORMAL FINDINGS: ICD-10-CM

## 2024-05-30 DIAGNOSIS — B97.7 PAPILLOMAVIRUS AS THE CAUSE OF DISEASES CLASSIFIED ELSEWHERE: ICD-10-CM

## 2024-05-30 DIAGNOSIS — Z87.42 PERSONAL HISTORY OF OTHER DISEASES OF THE FEMALE GENITAL TRACT: ICD-10-CM

## 2024-05-30 PROCEDURE — 99459 PELVIC EXAMINATION: CPT

## 2024-05-30 PROCEDURE — 99387 INIT PM E/M NEW PAT 65+ YRS: CPT

## 2024-05-30 NOTE — PLAN
[FreeTextEntry1] : 68 year  y/o P3 presenting for annual exam -f/u pap and GC/CT -mammogram done this year - discussed that if she had three prior paps that were WNL, she no longer needs PAPs. Patient is unsure of her results.  -Contraception: none  - colonoscopy and DEXA  wnl done within the last 2 years  -f/u PRN

## 2024-05-30 NOTE — HISTORY OF PRESENT ILLNESS
[postmenopausal] : postmenopausal [N] : Patient does not use contraception [Monogamous (Male Partner)] : is monogamous with a male partner [Y] : Positive pregnancy history [Currently Active] : currently active [Men] : men [Vaginal] : vaginal [No] : No [Mammogramdate] : 2024 [BreastSonogramDate] : 2024 [TextBox_31] : H/O OF ABNORMAL PAP  [BoneDensityDate] : 2022 [ColonoscopyDate] : 2023 [TextBox_78] : HPV+ [PGHxTotal] : 4 [[facility]] : 3 [PGHxPremature] : 0 [PGHxAbortions] : 1 [[facility] Phoenix[facility]] : 3 [PGHxABInduced] : 1 [FreeTextEntry1] : topx1, nsvdX3

## 2024-05-30 NOTE — PHYSICAL EXAM
[de-identified] : Constitutional: 67 year old female, alert and oriented, cooperative, in no acute distress.  HEENT  NC/AT.  Appearance: symmetric  Neck/Back Straight without deformity or instability.  Good ROM.  Chest/Respiratory  Respiratory effort: no intercostal retractions or use of accessory muscles. Nonlabored Breathing  Mental Status:  Judgment, insight: intact Orientation: oriented to time, place, and person  Neurological: Sensory and Motor are grossly intact throughout  Left Knee  Inspection:     Skin intact, no rashes or lesions     No Effusion     Non-tender to palpation over tibial tubercle, patella, lateral joint line, and pes insertion.     Tenderness over the medial joint line  Range of Motion: 	Extension - 0 degrees 	Flexion - 120 degrees 	Extensor lag: None  Stability:      Demonstrates no Varus or Valgus instability      Negative Anterior or Posterior drawer.      Negative Lachman's  Patella: stable, tracks well.   Neurologic Exam     Motor intact including 5/5 Extensor Hallucis Longus, 5/5 Flexor Hallucis Longus, 5/5 Tibialis Anterior and 5/5 Gastrocnemius     Sensation Intact to Light Touch including Saphenous, Sural, Superficial Peroneal, Deep Peroneal, Tibial nerve distributions  Vascular Exam     Foot is warm and well perfused with 2+ Dorsalis Pedis Pulse  There is Right lumbar paraspinal muscle tenderness. [de-identified] : XRay: XRays of the Left  Knee (4 Views) taken on 8/25/2024. XRays demonstrate joint space narrowing in the medial and patellofemoral compartments, consistent with osteoarthritis, KL Grade: 2. (my personal interpretation)    XRay:  XRays of the Pelvis (1 View) and Right Hip (2 Views) taken on 7/14/2023. XRays demonstrate a Right Total Hip Arthroplasty.  There is some acetabular profunda. There are 3 acetabular screws in place.  There is heterotopic ossification around the hip capsule and proximal femur/femoral stem.  (my personal interpretation)  XRay:  XRays of the Lumbar Spine (2 Views) taken on 2/24/2023. XRays demonstrate no obvious fracture or dislocation.  There is a small spondylolisthesis on L4-5.  (my personal interpretation).    EXAM: [id number] - CT 3D RECONSTRUCT WO [facility]K[place]ON  - ORDERED BY: SUSSY MCCULLOUGH  EXAM: [id number] - CT HIP ONLY RT  - ORDERED BY: SUSSY MCCULLOUGH   PROCEDURE DATE:  07/25/2023    INTERPRETATION:  CT OF THE RIGHT HIP  CLINICAL INFORMATION: Pain, status post total arthroplasty 1 year prior.  COMPARISON: Postoperative fluoroscopic guided aspiration dated 3/3/2023. Preoperative CT abdomen and pelvis dated 6/2/2021.  TECHNIQUE: Axial CT images were obtained of the right hip with coronal and sagittal reconstructions. 3-D volume rendered reformats were also provided. No intravenous contrast was administered.  FINDINGS:  Osseous: Status post remote noncemented right hip total arthroplasty with 3 acetabular augmentation screws. Protrusio of the acetabular component. No osteolysis, definite subsidence, or displaced periprosthetic fracture. Michelle grade 2/3 heterotopic ossification most prominently involving the anterior iliofemoral capsular ligament. Mineralization otherwise within normal limits. No aggressive osseous neoplasm  Soft tissues: No definite postsurgical effusion or periprosthetic fluid collection. No sizable hyperattenuating hematoma. No pelvic free fluid.  IMPRESSION:  1.  Status post remote right hip total arthroplasty with nonspecific protrusio of the acetabular component, both without convincing CT evidence for hardware loosening or periprosthetic fracture. No definite postsurgical effusion. 2.  Wynne grade 2/3 right hip periprosthetic heterotopic ossification, most prominently involving the anterior iliofemoral capsular ligament.  --- End of Report ---  ALBERTO ESPITIA MD; Attending Radiologist This document has been electronically signed. Jul 27 2023  2:03PM
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
## 2024-05-30 NOTE — DISCUSSION/SUMMARY
[de-identified] : Shelbie Hamilton is a 68-year-old female who presents to the office for evaluation of her left knee pain.  Patient has been experiencing left knee pain throughout the knee.  X-rays showed left knee osteoarthritis.  Examination showed tenderness over the knee, but otherwise good knee range of motion.  Discussed with the patient the examination and imaging findings.  Discussed with patient that her pain is likely secondary to her knee osteoarthritis.  Discussed with the patient management of her pain at this time, including physical therapy, pain control, and injections.  Patient is planned for lab work tomorrow would like to defer injections at this time.  She was given prescription for physical therapy.  Patient will take Tylenol as needed for pain control.  Patient will follow-up as needed for reevaluation and management.  Patient understanding and in agreement with the plan.  All questions answered.  Plan: -Physical therapy -Tylenol as needed for pain control -Follow-up as needed for reevaluation and management

## 2024-05-31 ENCOUNTER — APPOINTMENT (OUTPATIENT)
Dept: ORTHOPEDIC SURGERY | Facility: CLINIC | Age: 69
End: 2024-05-31

## 2024-05-31 LAB
C TRACH RRNA SPEC QL NAA+PROBE: NOT DETECTED
N GONORRHOEA RRNA SPEC QL NAA+PROBE: NOT DETECTED
SOURCE AMPLIFICATION: NORMAL

## 2024-06-02 LAB
HPV 16 E6+E7 MRNA CVX QL NAA+PROBE: NOT DETECTED
HPV HIGH+LOW RISK DNA PNL CVX: NOT DETECTED
HPV18+45 E6+E7 MRNA CVX QL NAA+PROBE: NOT DETECTED

## 2024-06-05 LAB — CYTOLOGY CVX/VAG DOC THIN PREP: ABNORMAL

## 2024-09-04 ENCOUNTER — EMERGENCY (EMERGENCY)
Facility: HOSPITAL | Age: 69
LOS: 1 days | Discharge: HOME | End: 2024-09-04
Attending: STUDENT IN AN ORGANIZED HEALTH CARE EDUCATION/TRAINING PROGRAM | Admitting: STUDENT IN AN ORGANIZED HEALTH CARE EDUCATION/TRAINING PROGRAM
Payer: MEDICARE

## 2024-09-04 VITALS
DIASTOLIC BLOOD PRESSURE: 82 MMHG | OXYGEN SATURATION: 98 % | HEART RATE: 58 BPM | TEMPERATURE: 98 F | WEIGHT: 198.42 LBS | RESPIRATION RATE: 18 BRPM | SYSTOLIC BLOOD PRESSURE: 196 MMHG | HEIGHT: 66 IN

## 2024-09-04 VITALS
DIASTOLIC BLOOD PRESSURE: 69 MMHG | SYSTOLIC BLOOD PRESSURE: 157 MMHG | OXYGEN SATURATION: 99 % | TEMPERATURE: 98 F | RESPIRATION RATE: 18 BRPM | HEART RATE: 59 BPM

## 2024-09-04 DIAGNOSIS — Z47.1 AFTERCARE FOLLOWING JOINT REPLACEMENT SURGERY: Chronic | ICD-10-CM

## 2024-09-04 DIAGNOSIS — Z96.641 PRESENCE OF RIGHT ARTIFICIAL HIP JOINT: Chronic | ICD-10-CM

## 2024-09-04 LAB
ALBUMIN SERPL ELPH-MCNC: 4.3 G/DL — SIGNIFICANT CHANGE UP (ref 3.3–5)
ALP SERPL-CCNC: 104 U/L — SIGNIFICANT CHANGE UP (ref 40–120)
ALT FLD-CCNC: 20 U/L — SIGNIFICANT CHANGE UP (ref 4–33)
ANION GAP SERPL CALC-SCNC: 13 MMOL/L — SIGNIFICANT CHANGE UP (ref 7–14)
APPEARANCE UR: CLEAR — SIGNIFICANT CHANGE UP
AST SERPL-CCNC: 25 U/L — SIGNIFICANT CHANGE UP (ref 4–32)
BASOPHILS # BLD AUTO: 0.05 K/UL — SIGNIFICANT CHANGE UP (ref 0–0.2)
BASOPHILS NFR BLD AUTO: 0.7 % — SIGNIFICANT CHANGE UP (ref 0–2)
BILIRUB SERPL-MCNC: 0.4 MG/DL — SIGNIFICANT CHANGE UP (ref 0.2–1.2)
BILIRUB UR-MCNC: NEGATIVE — SIGNIFICANT CHANGE UP
BLOOD GAS VENOUS COMPREHENSIVE RESULT: SIGNIFICANT CHANGE UP
BUN SERPL-MCNC: 35 MG/DL — HIGH (ref 7–23)
CALCIUM SERPL-MCNC: 9.5 MG/DL — SIGNIFICANT CHANGE UP (ref 8.4–10.5)
CHLORIDE SERPL-SCNC: 104 MMOL/L — SIGNIFICANT CHANGE UP (ref 98–107)
CO2 SERPL-SCNC: 21 MMOL/L — LOW (ref 22–31)
COLOR SPEC: YELLOW — SIGNIFICANT CHANGE UP
CREAT SERPL-MCNC: 1.47 MG/DL — HIGH (ref 0.5–1.3)
D DIMER BLD IA.RAPID-MCNC: 655 NG/ML DDU — HIGH
DIFF PNL FLD: NEGATIVE — SIGNIFICANT CHANGE UP
EGFR: 38 ML/MIN/1.73M2 — LOW
EOSINOPHIL # BLD AUTO: 0.09 K/UL — SIGNIFICANT CHANGE UP (ref 0–0.5)
EOSINOPHIL NFR BLD AUTO: 1.2 % — SIGNIFICANT CHANGE UP (ref 0–6)
GLUCOSE SERPL-MCNC: 97 MG/DL — SIGNIFICANT CHANGE UP (ref 70–99)
GLUCOSE UR QL: NEGATIVE MG/DL — SIGNIFICANT CHANGE UP
HCT VFR BLD CALC: 38.3 % — SIGNIFICANT CHANGE UP (ref 34.5–45)
HGB BLD-MCNC: 13 G/DL — SIGNIFICANT CHANGE UP (ref 11.5–15.5)
IANC: 4.23 K/UL — SIGNIFICANT CHANGE UP (ref 1.8–7.4)
IMM GRANULOCYTES NFR BLD AUTO: 0.3 % — SIGNIFICANT CHANGE UP (ref 0–0.9)
KETONES UR-MCNC: NEGATIVE MG/DL — SIGNIFICANT CHANGE UP
LEUKOCYTE ESTERASE UR-ACNC: NEGATIVE — SIGNIFICANT CHANGE UP
LYMPHOCYTES # BLD AUTO: 2.66 K/UL — SIGNIFICANT CHANGE UP (ref 1–3.3)
LYMPHOCYTES # BLD AUTO: 35.7 % — SIGNIFICANT CHANGE UP (ref 13–44)
MCHC RBC-ENTMCNC: 27.6 PG — SIGNIFICANT CHANGE UP (ref 27–34)
MCHC RBC-ENTMCNC: 33.9 GM/DL — SIGNIFICANT CHANGE UP (ref 32–36)
MCV RBC AUTO: 81.3 FL — SIGNIFICANT CHANGE UP (ref 80–100)
MONOCYTES # BLD AUTO: 0.4 K/UL — SIGNIFICANT CHANGE UP (ref 0–0.9)
MONOCYTES NFR BLD AUTO: 5.4 % — SIGNIFICANT CHANGE UP (ref 2–14)
NEUTROPHILS # BLD AUTO: 4.23 K/UL — SIGNIFICANT CHANGE UP (ref 1.8–7.4)
NEUTROPHILS NFR BLD AUTO: 56.7 % — SIGNIFICANT CHANGE UP (ref 43–77)
NITRITE UR-MCNC: NEGATIVE — SIGNIFICANT CHANGE UP
NRBC # BLD: 0 /100 WBCS — SIGNIFICANT CHANGE UP (ref 0–0)
NRBC # FLD: 0 K/UL — SIGNIFICANT CHANGE UP (ref 0–0)
PH UR: 6 — SIGNIFICANT CHANGE UP (ref 5–8)
PLATELET # BLD AUTO: 186 K/UL — SIGNIFICANT CHANGE UP (ref 150–400)
POTASSIUM SERPL-MCNC: 5 MMOL/L — SIGNIFICANT CHANGE UP (ref 3.5–5.3)
POTASSIUM SERPL-SCNC: 5 MMOL/L — SIGNIFICANT CHANGE UP (ref 3.5–5.3)
PROT SERPL-MCNC: 7.7 G/DL — SIGNIFICANT CHANGE UP (ref 6–8.3)
PROT UR-MCNC: NEGATIVE MG/DL — SIGNIFICANT CHANGE UP
RBC # BLD: 4.71 M/UL — SIGNIFICANT CHANGE UP (ref 3.8–5.2)
RBC # FLD: 15.1 % — HIGH (ref 10.3–14.5)
SODIUM SERPL-SCNC: 138 MMOL/L — SIGNIFICANT CHANGE UP (ref 135–145)
SP GR SPEC: 1.01 — SIGNIFICANT CHANGE UP (ref 1–1.03)
TROPONIN T, HIGH SENSITIVITY RESULT: 7 NG/L — SIGNIFICANT CHANGE UP
UROBILINOGEN FLD QL: 0.2 MG/DL — SIGNIFICANT CHANGE UP (ref 0.2–1)
WBC # BLD: 7.45 K/UL — SIGNIFICANT CHANGE UP (ref 3.8–10.5)
WBC # FLD AUTO: 7.45 K/UL — SIGNIFICANT CHANGE UP (ref 3.8–10.5)

## 2024-09-04 PROCEDURE — 71275 CT ANGIOGRAPHY CHEST: CPT | Mod: 26,MC

## 2024-09-04 PROCEDURE — 99285 EMERGENCY DEPT VISIT HI MDM: CPT

## 2024-09-04 PROCEDURE — 93010 ELECTROCARDIOGRAM REPORT: CPT

## 2024-09-04 PROCEDURE — 71046 X-RAY EXAM CHEST 2 VIEWS: CPT | Mod: 26

## 2024-09-04 PROCEDURE — 74177 CT ABD & PELVIS W/CONTRAST: CPT | Mod: 26,MC

## 2024-09-04 RX ORDER — CEFUROXIME SODIUM 1.5 G
1 VIAL (EA) INJECTION
Qty: 14 | Refills: 0
Start: 2024-09-04 | End: 2024-09-10

## 2024-09-04 RX ORDER — ACETAMINOPHEN 325 MG/1
975 TABLET ORAL ONCE
Refills: 0 | Status: COMPLETED | OUTPATIENT
Start: 2024-09-04 | End: 2024-09-04

## 2024-09-04 RX ORDER — SODIUM CHLORIDE 9 MG/ML
1000 INJECTION INTRAMUSCULAR; INTRAVENOUS; SUBCUTANEOUS ONCE
Refills: 0 | Status: COMPLETED | OUTPATIENT
Start: 2024-09-04 | End: 2024-09-04

## 2024-09-04 RX ADMIN — SODIUM CHLORIDE 1000 MILLILITER(S): 9 INJECTION INTRAMUSCULAR; INTRAVENOUS; SUBCUTANEOUS at 10:40

## 2024-09-04 RX ADMIN — ACETAMINOPHEN 975 MILLIGRAM(S): 325 TABLET ORAL at 10:40

## 2024-09-04 NOTE — PROVIDER CONTACT NOTE (OTHER) - ASSESSMENT
Arranged [facility] through [facility] Inv# [id number]. Pt wanted to go to [id number] [place] [address]. P/U 5:30. Provided Info to pt to call when ready.

## 2024-09-04 NOTE — ED PROVIDER NOTE - PROGRESS NOTE DETAILS
dimer +, CTA neg, trop neg, labs and UA with no acute findings, given dysuria will still treat urine, pcp fu

## 2024-09-04 NOTE — ED PROVIDER NOTE - CLINICAL SUMMARY MEDICAL DECISION MAKING FREE TEXT BOX
67yo F ho DM2, HTN, pw 3 days left mid back pain, also with sweats. complains of dysuria as well.   no fevers or chills. feels slightly winded when she walks and also noted some pink sputum this am. no nausea, no vomiting no abd pain  dysuria- eval UTI, + left back pain, but not over CVA, more midline and with complaint of SOB, will check dimer and cxr,

## 2024-09-04 NOTE — ED PROVIDER NOTE - PATIENT PORTAL LINK FT
You can access the FollowMyHealth Patient Portal offered by [facility] by registering at the following website: [email or web address]. By joining [facility]’s FollowMyHealth portal, you will also be able to view your health information using other applications (apps) compatible with our system.

## 2024-09-04 NOTE — ED ADULT TRIAGE NOTE - CHIEF COMPLAINT QUOTE
pt living with DM type2, c/o of rt sided flank pain and dysuria for 3 days, pt c/o of chills as well

## 2024-09-04 NOTE — ED PROVIDER NOTE - NSFOLLOWUPINSTRUCTIONS_ED_ALL_ED_FT
1. TAKE ALL MEDICATIONS AS DIRECTED.    2. FOR PAIN OR FEVER YOU CAN TAKE ACETAMINOPHEN (TYLENOL) AS NEEDED, AS DIRECTED ON PACKAGING.  3. FOLLOW UP WITH YOUR PRIMARY DOCTOR WITHIN 5 DAYS AS DIRECTED.  Take antibiotics as directed   4. IF YOU HAD LABS OR IMAGING DONE, YOU WERE GIVEN COPIES OF ALL LABS AND/OR IMAGING RESULTS FROM YOUR ER VISIT--PLEASE TAKE THEM WITH YOU TO YOUR FOLLOW UP APPOINTMENTS.  5. IF NEEDED, CALL PATIENT ACCESS SERVICES AT [phone] TO FIND A PRIMARY CARE PHYSICIAN.  OR CALL [phone] TO MAKE AN APPOINTMENT WITH THE CLINIC.  6. RETURN TO THE ER FOR ANY WORSENING SYMPTOMS OR CONCERNS.    Back Pain    Back pain is very common in adults. The cause of back pain is rarely dangerous and the pain often gets better over time. The cause of your back pain may not be known and may include strain of muscles or ligaments, degeneration of the spinal disks, or arthritis. Occasionally the pain may radiate down your leg(s). Over-the-counter medicines to reduce pain and inflammation are often the most helpful. Stretching and remaining active frequently helps the healing process.     SEEK IMMEDIATE MEDICAL CARE IF YOU HAVE ANY OF THE FOLLOWING SYMPTOMS: bowel or bladder control problems, unusual weakness or numbness in your arms or legs, nausea or vomiting, abdominal pain, fever, dizziness/lightheadedness.    Urinary Tract Infection, Adult  ImageA urinary tract infection (UTI) is an infection of any part of the urinary tract, which includes the kidneys, ureters, bladder, and urethra. These organs make, store, and get rid of urine in the body. UTI can be a bladder infection (cystitis) or kidney infection (pyelonephritis).    What are the causes?  This infection may be caused by fungi, viruses, or bacteria. Bacteria are the most common cause of UTIs. This condition can also be caused by repeated incomplete emptying of the bladder during urination.    What increases the risk?  This condition is more likely to develop if:    You ignore your need to urinate or hold urine for long periods of time.  You do not empty your bladder completely during urination.  You wipe back to front after urinating or having a bowel movement, if you are female.  You are uncircumcised, if you are male.  You are constipated.  You have a urinary catheter that stays in place (indwelling).  You have a weak defense (immune) system.  You have a medical condition that affects your bowels, kidneys, or bladder.  You have diabetes.  You take antibiotic medicines frequently or for long periods of time, and the antibiotics no longer work well against certain types of infections (antibiotic resistance).  You take medicines that irritate your urinary tract.  You are exposed to chemicals that irritate your urinary tract.  You are female.    What are the signs or symptoms?  Symptoms of this condition include:    Fever.  Frequent urination or passing small amounts of urine frequently.  Needing to urinate urgently.  Pain or burning with urination.  Urine that smells bad or unusual.  Cloudy urine.  Pain in the lower abdomen or back.  Trouble urinating.  Blood in the urine.  Vomiting or being less hungry than normal.  Diarrhea or abdominal pain.  Vaginal discharge, if you are female.    How is this diagnosed?  This condition is diagnosed with a medical history and physical exam. You will also need to provide a urine sample to test your urine. Other tests may be done, including:    Blood tests.  Sexually transmitted disease (STD) testing.    If you have had more than one UTI, a cystoscopy or imaging studies may be done to determine the cause of the infections.    How is this treated?  Treatment for this condition often includes a combination of two or more of the following:    Antibiotic medicine.  Other medicines to treat less common causes of UTI.  Over-the-counter medicines to treat pain.  Drinking enough water to stay hydrated.    Follow these instructions at home:  Take over-the-counter and prescription medicines only as told by your health care provider.  If you were prescribed an antibiotic, take it as told by your health care provider. Do not stop taking the antibiotic even if you start to feel better.  Avoid alcohol, caffeine, tea, and carbonated beverages. They can irritate your bladder.  Drink enough fluid to keep your urine clear or pale yellow.  Keep all follow-up visits as told by your health care provider. This is important.  ImageMake sure to:    Empty your bladder often and completely. Do not hold urine for long periods of time.  Empty your bladder before and after sex.  Wipe from front to back after a bowel movement if you are female. Use each tissue one time when you wipe.    Contact a health care provider if:  You have back pain.  You have a fever.  You feel nauseous or vomit.  Your symptoms do not get better after 3 days.  Your symptoms go away and then return.  Get help right away if:  You have severe back pain or lower abdominal pain.  You are vomiting and cannot keep down any medicines or water.  This information is not intended to replace advice given to you by your health care provider. Make sure you discuss any questions you have with your health care provider.

## 2024-09-04 NOTE — ED PROVIDER NOTE - PHYSICAL EXAMINATION
Gen: Well appearing in NAD  Head: NC/AT  Neck: trachea midline  cv: rrr  Resp:  No distress, lungs clear  abd nontender  left paraspinal tendenress   Ext: no deformities  Neuro:  A&O appears non focal  Skin:  Warm and dry as visualized  Psych:  Normal affect and mood

## 2024-09-04 NOTE — ED PROVIDER NOTE - OBJECTIVE STATEMENT
69yo F ho DM2, HTN, pw 3 days left mid back pain, also with sweats. complains of dysuria as well.   no fevers or chills. feels slightly winded when she walks and also noted some pink sputum this am. no nausea, no vomiting no abd pain

## 2024-09-04 NOTE — ED ADULT NURSE NOTE - OBJECTIVE STATEMENT
c/o upper back pain x 1 week. sts pain is intermittent. no injury/trauma. denies blood in urine/dysuria. sts this morning coughed up pink tinged sputum. denies cp/sob, n/v/d/headache/fever/chills. aao x 4, speaking in complete sentences, no drooling noted. RR even and unlabored. SL inserted, labs drawn. IVF ongoing, medicated as per eMAR

## 2024-09-11 ENCOUNTER — APPOINTMENT (OUTPATIENT)
Dept: INTERNAL MEDICINE | Facility: CLINIC | Age: 69
End: 2024-09-11

## 2024-09-24 ENCOUNTER — APPOINTMENT (OUTPATIENT)
Dept: INTERNAL MEDICINE | Facility: CLINIC | Age: 69
End: 2024-09-24

## 2024-09-24 VITALS
DIASTOLIC BLOOD PRESSURE: 80 MMHG | HEART RATE: 83 BPM | WEIGHT: 187 LBS | OXYGEN SATURATION: 98 % | TEMPERATURE: 97.8 F | SYSTOLIC BLOOD PRESSURE: 140 MMHG | BODY MASS INDEX: 36.71 KG/M2 | HEIGHT: 60 IN

## 2024-09-24 PROCEDURE — 99214 OFFICE O/P EST MOD 30 MIN: CPT | Mod: 25

## 2024-09-24 NOTE — HISTORY OF PRESENT ILLNESS
[de-identified] : Patient went to the ER 9/4/2024 for UTI symptoms.  She had dysuria, back pains, and chills.  She also was feeling chest pains.  She had a negative U/A, and CTA chest and CT Abd/pelvis - no pulmonary embolism, and no major findings in the chest, abdomen, and pelvis.  She was given cefuroxime to take for a week on discharge.  She felt nauseous on the antibiotic and stopped after 4 days.  Today, she has pain in her back, foot.  She has slight dysuria.  She takes the ASA 1-2x/week.  She has CKD on her blood.  She doesn't recall why she was on it, but she didn't have a DVT, TIA, CVA, or angina.  She is seeing nephrology

## 2024-09-25 LAB
ALBUMIN SERPL ELPH-MCNC: 4.9 G/DL
ALP BLD-CCNC: 114 U/L
ALT SERPL-CCNC: 23 U/L
ANION GAP SERPL CALC-SCNC: 16 MMOL/L
APPEARANCE: CLEAR
AST SERPL-CCNC: 20 U/L
BACTERIA: NEGATIVE /HPF
BILIRUB SERPL-MCNC: 0.4 MG/DL
BILIRUBIN URINE: NEGATIVE
BLOOD URINE: NEGATIVE
BUN SERPL-MCNC: 21 MG/DL
CALCIUM SERPL-MCNC: 10.1 MG/DL
CAST: 1 /LPF
CHLORIDE SERPL-SCNC: 99 MMOL/L
CO2 SERPL-SCNC: 23 MMOL/L
COLOR: YELLOW
CREAT SERPL-MCNC: 1.16 MG/DL
CREAT SPEC-SCNC: 120 MG/DL
EGFR: 51 ML/MIN/1.73M2
EPITHELIAL CELLS: 1 /HPF
ESTIMATED AVERAGE GLUCOSE: 137 MG/DL
GLUCOSE QUALITATIVE U: NEGATIVE MG/DL
GLUCOSE SERPL-MCNC: 90 MG/DL
HBA1C MFR BLD HPLC: 6.4 %
HDLC SERPL-MCNC: 65 MG/DL
KETONES URINE: NEGATIVE MG/DL
LDLC SERPL DIRECT ASSAY-MCNC: 108 MG/DL
LEUKOCYTE ESTERASE URINE: ABNORMAL
MICROALBUMIN 24H UR DL<=1MG/L-MCNC: <1.2 MG/DL
MICROALBUMIN/CREAT 24H UR-RTO: NORMAL MG/G
MICROSCOPIC-UA: NORMAL
NITRITE URINE: NEGATIVE
PH URINE: 5.5
POTASSIUM SERPL-SCNC: 5 MMOL/L
PROT SERPL-MCNC: 8.6 G/DL
PROTEIN URINE: NEGATIVE MG/DL
RED BLOOD CELLS URINE: 0 /HPF
SODIUM SERPL-SCNC: 138 MMOL/L
SPECIFIC GRAVITY URINE: 1.01
UROBILINOGEN URINE: 0.2 MG/DL
WHITE BLOOD CELLS URINE: 1 /HPF

## 2024-09-26 LAB — BACTERIA UR CULT: NORMAL

## 2024-10-01 ENCOUNTER — APPOINTMENT (OUTPATIENT)
Dept: ORTHOPEDIC SURGERY | Facility: CLINIC | Age: 69
End: 2024-10-01
Payer: MEDICARE

## 2024-10-01 DIAGNOSIS — M25.562 PAIN IN LEFT KNEE: ICD-10-CM

## 2024-10-01 DIAGNOSIS — T84.84XA PAIN DUE TO INTERNAL ORTHOPEDIC PROSTHETIC DEVICES, IMPLANTS AND GRAFTS, INITIAL ENCOUNTER: ICD-10-CM

## 2024-10-01 DIAGNOSIS — Z96.649 PAIN DUE TO INTERNAL ORTHOPEDIC PROSTHETIC DEVICES, IMPLANTS AND GRAFTS, INITIAL ENCOUNTER: ICD-10-CM

## 2024-10-01 PROCEDURE — 99213 OFFICE O/P EST LOW 20 MIN: CPT
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
## 2024-10-01 NOTE — PHYSICAL EXAM
[de-identified] : Constitutional: 68 year old female, alert and oriented, cooperative, in no acute distress.  HEENT  NC/AT.  Appearance: symmetric  Neck/Back Straight without deformity or instability.  Good ROM.  Chest/Respiratory  Respiratory effort: no intercostal retractions or use of accessory muscles. Nonlabored Breathing  Mental Status:  Judgment, insight: intact Orientation: oriented to time, place, and person  Neurological: Sensory and Motor are grossly intact throughout  Left Knee  Inspection:     Skin intact, no rashes or lesions     No Effusion     Non-tender to palpation over tibial tubercle, patella, lateral joint line, and pes insertion.     Tenderness over the medial joint line  Range of Motion: 	Extension - 0 degrees 	Flexion - 120 degrees 	Extensor lag: None  Stability:      Demonstrates no Varus or Valgus instability      Negative Anterior or Posterior drawer.      Negative Lachman's  Patella: stable, tracks well.   Neurologic Exam     Motor intact including 5/5 Extensor Hallucis Longus, 5/5 Flexor Hallucis Longus, 5/5 Tibialis Anterior and 5/5 Gastrocnemius     Sensation Intact to Light Touch including Saphenous, Sural, Superficial Peroneal, Deep Peroneal, Tibial nerve distributions  Vascular Exam     Foot is warm and well perfused with 2+ Dorsalis Pedis Pulse  There is Right lumbar paraspinal muscle tenderness.  Right Hip Exam:  Tenderness:  Sacroiliac: Positive  Greater trochanter: Negative   ESTELLA Test: Positive   FADIR Test: Positive  Range of Motion: Pain with hip ROM  Extension - 0  Flexion - 80  IR - 0  ER - 20  Abd - 30  Add - 20  Stability: Normal without instability [de-identified] : XRay: XRays of the Left  Knee (4 Views) taken on 8/25/2024. XRays demonstrate joint space narrowing in the medial and patellofemoral compartments, consistent with osteoarthritis, KL Grade: 2. (my personal interpretation)    XRay:  XRays of the Pelvis (1 View) and Right Hip (2 Views) taken on 7/14/2023. XRays demonstrate a Right Total Hip Arthroplasty.  There is some acetabular profunda. There are 3 acetabular screws in place.  There is heterotopic ossification around the hip capsule and proximal femur/femoral stem.  (my personal interpretation)  XRay:  XRays of the Lumbar Spine (2 Views) taken on 2/24/2023. XRays demonstrate no obvious fracture or dislocation.  There is a small spondylolisthesis on L4-5.  (my personal interpretation).    EXAM: [id number] - CT 3D RECONSTRUCT WO WRKSTATON  - ORDERED BY: SUSSY MCCULLOUGH  EXAM: [id number] - CT HIP ONLY RT  - ORDERED BY: SUSSY MCCULLOUGH   PROCEDURE DATE:  07/25/2023    INTERPRETATION:  CT OF THE RIGHT HIP  CLINICAL INFORMATION: Pain, status post total arthroplasty 1 year prior.  COMPARISON: Postoperative fluoroscopic guided aspiration dated 3/3/2023. Preoperative CT abdomen and pelvis dated 6/2/2021.  TECHNIQUE: Axial CT images were obtained of the right hip with coronal and sagittal reconstructions. 3-D volume rendered reformats were also provided. No intravenous contrast was administered.  FINDINGS:  Osseous: Status post remote noncemented right hip total arthroplasty with 3 acetabular augmentation screws. Protrusio of the acetabular component. No osteolysis, definite subsidence, or displaced periprosthetic fracture. Michelle grade 2/3 heterotopic ossification most prominently involving the anterior iliofemoral capsular ligament. Mineralization otherwise within normal limits. No aggressive osseous neoplasm  Soft tissues: No definite postsurgical effusion or periprosthetic fluid collection. No sizable hyperattenuating hematoma. No pelvic free fluid.  IMPRESSION:  1.  Status post remote right hip total arthroplasty with nonspecific protrusio of the acetabular component, both without convincing CT evidence for hardware loosening or periprosthetic fracture. No definite postsurgical effusion. 2.  [place]lap grade 2/3 right hip periprosthetic heterotopic ossification, most prominently involving the anterior iliofemoral capsular ligament.  --- End of Report ---  ALBERTO ESPITIA MD; Attending Radiologist This document has been electronically signed. Jul 27 2023  2:03PM

## 2024-10-01 NOTE — DISCUSSION/SUMMARY
[de-identified] : Shelbie Hamilton is a 68-year-old female who presents to the office for follow up of her left knee and right hip pain.  Patient continues to experience pain. Prior x-rays showed right hip heterotopic ossification and left knee osteoarthritis.  Examination showed tenderness over the knee and decreased right hip range of motion.  Discussed with the patient the examination and imaging findings.  Discussed with patient the management of her hip and knee pain, including physical therapy, pain control, and knee injections.  Patient was given referral to physical therapy.  She will take Tylenol as needed for her pain control.  Discussed knee injections.  Patient had recent flu vaccine would like to defer injection at this time. Patient was given a referral to neurology for her neuropathy. Patient will follow-up in the office in 6 months or sooner for injection.  Patient understanding and in agreement with the plan.  All questions answered.  Plan: -Physical therapy -Tylenol as needed for pain control -Follow-up in 6 months for reevaluation and management

## 2024-10-01 NOTE — DISCUSSION/SUMMARY
[de-identified] : Shelbie Hamilton is a 68-year-old female who presents to the office for follow up of her left knee and right hip pain.  Patient continues to experience pain. Prior x-rays showed right hip heterotopic ossification and left knee osteoarthritis.  Examination showed tenderness over the knee and decreased right hip range of motion.  Discussed with the patient the examination and imaging findings.  Discussed with patient the management of her hip and knee pain, including physical therapy, pain control, and knee injections.  Patient was given referral to physical therapy.  She will take Tylenol as needed for her pain control.  Discussed knee injections.  Patient had recent flu vaccine would like to defer injection at this time. Patient was given a referral to neurology for her neuropathy. Patient will follow-up in the office in 6 months or sooner for injection.  Patient understanding and in agreement with the plan.  All questions answered.  Plan: -Physical therapy -Tylenol as needed for pain control -Follow-up in 6 months for reevaluation and management
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
## 2024-10-01 NOTE — PHYSICAL EXAM
[de-identified] : Constitutional: 68 year old female, alert and oriented, cooperative, in no acute distress.  HEENT  NC/AT.  Appearance: symmetric  Neck/Back Straight without deformity or instability.  Good ROM.  Chest/Respiratory  Respiratory effort: no intercostal retractions or use of accessory muscles. Nonlabored Breathing  Mental Status:  Judgment, insight: intact Orientation: oriented to time, place, and person  Neurological: Sensory and Motor are grossly intact throughout  Left Knee  Inspection:     Skin intact, no rashes or lesions     No Effusion     Non-tender to palpation over tibial tubercle, patella, lateral joint line, and pes insertion.     Tenderness over the medial joint line  Range of Motion: 	Extension - 0 degrees 	Flexion - 120 degrees 	Extensor lag: None  Stability:      Demonstrates no Varus or Valgus instability      Negative Anterior or Posterior drawer.      Negative Lachman's  Patella: stable, tracks well.   Neurologic Exam     Motor intact including 5/5 Extensor Hallucis Longus, 5/5 Flexor Hallucis Longus, 5/5 Tibialis Anterior and 5/5 Gastrocnemius     Sensation Intact to Light Touch including Saphenous, Sural, Superficial Peroneal, Deep Peroneal, Tibial nerve distributions  Vascular Exam     Foot is warm and well perfused with 2+ Dorsalis Pedis Pulse  There is Right lumbar paraspinal muscle tenderness.  Right Hip Exam:  Tenderness:  Sacroiliac: Positive  Greater trochanter: Negative   ESTELLA Test: Positive   FADIR Test: Positive  Range of Motion: Pain with hip ROM  Extension - 0  Flexion - 80  IR - 0  ER - 20  Abd - 30  Add - 20  Stability: Normal without instability [de-identified] : XRay: XRays of the Left  Knee (4 Views) taken on 8/25/2024. XRays demonstrate joint space narrowing in the medial and patellofemoral compartments, consistent with osteoarthritis, KL Grade: 2. (my personal interpretation)    XRay:  XRays of the Pelvis (1 View) and Right Hip (2 Views) taken on 7/14/2023. XRays demonstrate a Right Total Hip Arthroplasty.  There is some acetabular profunda. There are 3 acetabular screws in place.  There is heterotopic ossification around the hip capsule and proximal femur/femoral stem.  (my personal interpretation)  XRay:  XRays of the Lumbar Spine (2 Views) taken on 2/24/2023. XRays demonstrate no obvious fracture or dislocation.  There is a small spondylolisthesis on L4-5.  (my personal interpretation).    EXAM: [id number] - CT 3D RECONSTRUCT WO WRKSTATON  - ORDERED BY: SUSSY MCCULLOUGH  EXAM: [id number] - CT HIP ONLY RT  - ORDERED BY: SUSSY MCCULLOUGH   PROCEDURE DATE:  07/25/2023    INTERPRETATION:  CT OF THE RIGHT HIP  CLINICAL INFORMATION: Pain, status post total arthroplasty 1 year prior.  COMPARISON: Postoperative fluoroscopic guided aspiration dated 3/3/2023. Preoperative CT abdomen and pelvis dated 6/2/2021.  TECHNIQUE: Axial CT images were obtained of the right hip with coronal and sagittal reconstructions. 3-D volume rendered reformats were also provided. No intravenous contrast was administered.  FINDINGS:  Osseous: Status post remote noncemented right hip total arthroplasty with 3 acetabular augmentation screws. Protrusio of the acetabular component. No osteolysis, definite subsidence, or displaced periprosthetic fracture. Michelle grade 2/3 heterotopic ossification most prominently involving the anterior iliofemoral capsular ligament. Mineralization otherwise within normal limits. No aggressive osseous neoplasm  Soft tissues: No definite postsurgical effusion or periprosthetic fluid collection. No sizable hyperattenuating hematoma. No pelvic free fluid.  IMPRESSION:  1.  Status post remote right hip total arthroplasty with nonspecific protrusio of the acetabular component, both without convincing CT evidence for hardware loosening or periprosthetic fracture. No definite postsurgical effusion. 2.  [place] grade 2/3 right hip periprosthetic heterotopic ossification, most prominently involving the anterior iliofemoral capsular ligament.  --- End of Report ---  ALBERTO ESPITIA MD; Attending Radiologist This document has been electronically signed. Jul 27 2023  2:03PM

## 2024-10-03 ENCOUNTER — RX RENEWAL (OUTPATIENT)
Age: 69
End: 2024-10-03

## 2024-10-08 ENCOUNTER — APPOINTMENT (OUTPATIENT)
Dept: ORTHOPEDIC SURGERY | Facility: CLINIC | Age: 69
End: 2024-10-08
Payer: MEDICARE

## 2024-10-08 DIAGNOSIS — M89.8X9 OTHER SPECIFIED DISORDERS OF BONE, UNSPECIFIED SITE: ICD-10-CM

## 2024-10-08 DIAGNOSIS — M76.821 POSTERIOR TIBIAL TENDINITIS, RIGHT LEG: ICD-10-CM

## 2024-10-08 DIAGNOSIS — Z98.890 OTHER SPECIFIED POSTPROCEDURAL STATES: ICD-10-CM

## 2024-10-08 DIAGNOSIS — M21.41 FLAT FOOT [PES PLANUS] (ACQUIRED), RIGHT FOOT: ICD-10-CM

## 2024-10-08 DIAGNOSIS — M62.461 CONTRACTURE OF MUSCLE, RIGHT LOWER LEG: ICD-10-CM

## 2024-10-08 DIAGNOSIS — S82.891S OTHER FRACTURE OF RIGHT LOWER LEG, SEQUELA: ICD-10-CM

## 2024-10-08 PROCEDURE — 99213 OFFICE O/P EST LOW 20 MIN: CPT

## 2024-10-11 ENCOUNTER — APPOINTMENT (OUTPATIENT)
Dept: ORTHOPEDIC SURGERY | Facility: CLINIC | Age: 69
End: 2024-10-11
Payer: MEDICARE

## 2024-10-11 VITALS
DIASTOLIC BLOOD PRESSURE: 82 MMHG | HEART RATE: 77 BPM | WEIGHT: 187 LBS | BODY MASS INDEX: 36.71 KG/M2 | HEIGHT: 60 IN | SYSTOLIC BLOOD PRESSURE: 142 MMHG

## 2024-10-11 DIAGNOSIS — M19.90 UNSPECIFIED OSTEOARTHRITIS, UNSPECIFIED SITE: ICD-10-CM

## 2024-10-11 PROCEDURE — 99214 OFFICE O/P EST MOD 30 MIN: CPT | Mod: 25

## 2024-10-11 PROCEDURE — 20610 DRAIN/INJ JOINT/BURSA W/O US: CPT | Mod: LT

## 2024-10-24 ENCOUNTER — OUTPATIENT (OUTPATIENT)
Dept: OUTPATIENT SERVICES | Facility: HOSPITAL | Age: 69
LOS: 1 days | Discharge: ANOTHER HOSPITAL | End: 2024-10-24
Payer: MEDICARE

## 2024-10-24 DIAGNOSIS — Z96.641 PRESENCE OF RIGHT ARTIFICIAL HIP JOINT: Chronic | ICD-10-CM

## 2024-10-24 DIAGNOSIS — Z47.1 AFTERCARE FOLLOWING JOINT REPLACEMENT SURGERY: Chronic | ICD-10-CM

## 2024-10-24 PROCEDURE — 90833 PSYTX W PT W E/M 30 MIN: CPT

## 2024-10-24 PROCEDURE — 99214 OFFICE O/P EST MOD 30 MIN: CPT

## 2024-10-24 PROCEDURE — 90839 PSYTX CRISIS INITIAL 60 MIN: CPT

## 2024-10-25 ENCOUNTER — APPOINTMENT (OUTPATIENT)
Dept: INTERNAL MEDICINE | Facility: CLINIC | Age: 69
End: 2024-10-25
Payer: MEDICARE

## 2024-10-25 VITALS
OXYGEN SATURATION: 98 % | TEMPERATURE: 97.7 F | HEART RATE: 94 BPM | SYSTOLIC BLOOD PRESSURE: 148 MMHG | HEIGHT: 68 IN | BODY MASS INDEX: 26.67 KG/M2 | WEIGHT: 176 LBS | DIASTOLIC BLOOD PRESSURE: 68 MMHG

## 2024-10-25 DIAGNOSIS — N39.0 URINARY TRACT INFECTION, SITE NOT SPECIFIED: ICD-10-CM

## 2024-10-25 PROCEDURE — 99214 OFFICE O/P EST MOD 30 MIN: CPT

## 2024-10-25 PROCEDURE — G2211 COMPLEX E/M VISIT ADD ON: CPT

## 2024-10-26 ENCOUNTER — EMERGENCY (EMERGENCY)
Facility: HOSPITAL | Age: 69
LOS: 1 days | Discharge: HOME | End: 2024-10-26
Attending: STUDENT IN AN ORGANIZED HEALTH CARE EDUCATION/TRAINING PROGRAM | Admitting: STUDENT IN AN ORGANIZED HEALTH CARE EDUCATION/TRAINING PROGRAM
Payer: MEDICARE

## 2024-10-26 ENCOUNTER — TRANSCRIPTION ENCOUNTER (OUTPATIENT)
Age: 69
End: 2024-10-26

## 2024-10-26 VITALS
HEIGHT: 66 IN | RESPIRATION RATE: 18 BRPM | TEMPERATURE: 98 F | SYSTOLIC BLOOD PRESSURE: 166 MMHG | WEIGHT: 175.93 LBS | DIASTOLIC BLOOD PRESSURE: 89 MMHG | HEART RATE: 98 BPM | OXYGEN SATURATION: 98 %

## 2024-10-26 VITALS
HEART RATE: 88 BPM | DIASTOLIC BLOOD PRESSURE: 73 MMHG | RESPIRATION RATE: 16 BRPM | SYSTOLIC BLOOD PRESSURE: 148 MMHG | OXYGEN SATURATION: 100 % | TEMPERATURE: 98 F

## 2024-10-26 DIAGNOSIS — Z96.641 PRESENCE OF RIGHT ARTIFICIAL HIP JOINT: Chronic | ICD-10-CM

## 2024-10-26 DIAGNOSIS — Z47.1 AFTERCARE FOLLOWING JOINT REPLACEMENT SURGERY: Chronic | ICD-10-CM

## 2024-10-26 LAB
ADD ON TEST-SPECIMEN IN LAB: SIGNIFICANT CHANGE UP
ALBUMIN SERPL ELPH-MCNC: 4.6 G/DL — SIGNIFICANT CHANGE UP (ref 3.3–5)
ALP SERPL-CCNC: 117 U/L — SIGNIFICANT CHANGE UP (ref 40–120)
ALT FLD-CCNC: 29 U/L — SIGNIFICANT CHANGE UP (ref 4–33)
ANION GAP SERPL CALC-SCNC: 14 MMOL/L — SIGNIFICANT CHANGE UP (ref 7–14)
APPEARANCE UR: CLEAR — SIGNIFICANT CHANGE UP
APPEARANCE: CLEAR
AST SERPL-CCNC: 37 U/L — HIGH (ref 4–32)
BACTERIA # UR AUTO: NEGATIVE /HPF — SIGNIFICANT CHANGE UP
BACTERIA: NEGATIVE /HPF
BASOPHILS # BLD AUTO: 0.07 K/UL — SIGNIFICANT CHANGE UP (ref 0–0.2)
BASOPHILS NFR BLD AUTO: 0.9 % — SIGNIFICANT CHANGE UP (ref 0–2)
BILIRUB SERPL-MCNC: 0.6 MG/DL — SIGNIFICANT CHANGE UP (ref 0.2–1.2)
BILIRUB UR-MCNC: NEGATIVE — SIGNIFICANT CHANGE UP
BILIRUBIN URINE: NEGATIVE
BLOOD URINE: NEGATIVE
BUN SERPL-MCNC: 29 MG/DL — HIGH (ref 7–23)
CALCIUM SERPL-MCNC: 10.3 MG/DL — SIGNIFICANT CHANGE UP (ref 8.4–10.5)
CAST: 0 /LPF
CAST: 2 /LPF — SIGNIFICANT CHANGE UP (ref 0–4)
CHLORIDE SERPL-SCNC: 95 MMOL/L — LOW (ref 98–107)
CO2 SERPL-SCNC: 26 MMOL/L — SIGNIFICANT CHANGE UP (ref 22–31)
COLOR SPEC: YELLOW — SIGNIFICANT CHANGE UP
COLOR: YELLOW
CREAT SERPL-MCNC: 1.44 MG/DL — HIGH (ref 0.5–1.3)
DIFF PNL FLD: NEGATIVE — SIGNIFICANT CHANGE UP
EGFR: 39 ML/MIN/1.73M2 — LOW
EOSINOPHIL # BLD AUTO: 0.02 K/UL — SIGNIFICANT CHANGE UP (ref 0–0.5)
EOSINOPHIL NFR BLD AUTO: 0.3 % — SIGNIFICANT CHANGE UP (ref 0–6)
EPITHELIAL CELLS: 1 /HPF
GLUCOSE QUALITATIVE U: NEGATIVE MG/DL
GLUCOSE SERPL-MCNC: 76 MG/DL — SIGNIFICANT CHANGE UP (ref 70–99)
GLUCOSE UR QL: NEGATIVE MG/DL — SIGNIFICANT CHANGE UP
HCT VFR BLD CALC: 40.5 % — SIGNIFICANT CHANGE UP (ref 34.5–45)
HGB BLD-MCNC: 14.1 G/DL — SIGNIFICANT CHANGE UP (ref 11.5–15.5)
IANC: 5.13 K/UL — SIGNIFICANT CHANGE UP (ref 1.8–7.4)
IMM GRANULOCYTES NFR BLD AUTO: 0.1 % — SIGNIFICANT CHANGE UP (ref 0–0.9)
KETONES UR-MCNC: NEGATIVE MG/DL — SIGNIFICANT CHANGE UP
KETONES URINE: NEGATIVE MG/DL
LEUKOCYTE ESTERASE UR-ACNC: ABNORMAL
LEUKOCYTE ESTERASE URINE: NEGATIVE
LYMPHOCYTES # BLD AUTO: 1.74 K/UL — SIGNIFICANT CHANGE UP (ref 1–3.3)
LYMPHOCYTES # BLD AUTO: 23.4 % — SIGNIFICANT CHANGE UP (ref 13–44)
MCHC RBC-ENTMCNC: 28.3 PG — SIGNIFICANT CHANGE UP (ref 27–34)
MCHC RBC-ENTMCNC: 34.8 GM/DL — SIGNIFICANT CHANGE UP (ref 32–36)
MCV RBC AUTO: 81.3 FL — SIGNIFICANT CHANGE UP (ref 80–100)
MICROSCOPIC-UA: NORMAL
MONOCYTES # BLD AUTO: 0.48 K/UL — SIGNIFICANT CHANGE UP (ref 0–0.9)
MONOCYTES NFR BLD AUTO: 6.4 % — SIGNIFICANT CHANGE UP (ref 2–14)
NEUTROPHILS # BLD AUTO: 5.13 K/UL — SIGNIFICANT CHANGE UP (ref 1.8–7.4)
NEUTROPHILS NFR BLD AUTO: 68.9 % — SIGNIFICANT CHANGE UP (ref 43–77)
NITRITE UR-MCNC: NEGATIVE — SIGNIFICANT CHANGE UP
NITRITE URINE: NEGATIVE
NRBC # BLD: 0 /100 WBCS — SIGNIFICANT CHANGE UP (ref 0–0)
NRBC # FLD: 0 K/UL — SIGNIFICANT CHANGE UP (ref 0–0)
PH UR: 6 — SIGNIFICANT CHANGE UP (ref 5–8)
PH URINE: 6.5
PLATELET # BLD AUTO: 299 K/UL — SIGNIFICANT CHANGE UP (ref 150–400)
POTASSIUM SERPL-MCNC: 4.3 MMOL/L — SIGNIFICANT CHANGE UP (ref 3.5–5.3)
POTASSIUM SERPL-SCNC: 4.3 MMOL/L — SIGNIFICANT CHANGE UP (ref 3.5–5.3)
PROT SERPL-MCNC: 8.9 G/DL — HIGH (ref 6–8.3)
PROT UR-MCNC: NEGATIVE MG/DL — SIGNIFICANT CHANGE UP
PROTEIN URINE: NEGATIVE MG/DL
RBC # BLD: 4.98 M/UL — SIGNIFICANT CHANGE UP (ref 3.8–5.2)
RBC # FLD: 14.1 % — SIGNIFICANT CHANGE UP (ref 10.3–14.5)
RBC CASTS # UR COMP ASSIST: 0 /HPF — SIGNIFICANT CHANGE UP (ref 0–4)
RED BLOOD CELLS URINE: 0 /HPF
SODIUM SERPL-SCNC: 135 MMOL/L — SIGNIFICANT CHANGE UP (ref 135–145)
SP GR SPEC: 1.01 — SIGNIFICANT CHANGE UP (ref 1–1.03)
SPECIFIC GRAVITY URINE: 1.01
SQUAMOUS # UR AUTO: 3 /HPF — SIGNIFICANT CHANGE UP (ref 0–5)
UROBILINOGEN FLD QL: 0.2 MG/DL — SIGNIFICANT CHANGE UP (ref 0.2–1)
UROBILINOGEN URINE: 0.2 MG/DL
WBC # BLD: 7.45 K/UL — SIGNIFICANT CHANGE UP (ref 3.8–10.5)
WBC # FLD AUTO: 7.45 K/UL — SIGNIFICANT CHANGE UP (ref 3.8–10.5)
WBC UR QL: 3 /HPF — SIGNIFICANT CHANGE UP (ref 0–5)
WHITE BLOOD CELLS URINE: 0 /HPF

## 2024-10-26 PROCEDURE — 99285 EMERGENCY DEPT VISIT HI MDM: CPT

## 2024-10-26 PROCEDURE — 93010 ELECTROCARDIOGRAM REPORT: CPT

## 2024-10-26 PROCEDURE — 74176 CT ABD & PELVIS W/O CONTRAST: CPT | Mod: 26,MC

## 2024-10-26 RX ORDER — SODIUM CHLORIDE 0.9 % (FLUSH) 0.9 %
1000 SYRINGE (ML) INJECTION ONCE
Refills: 0 | Status: COMPLETED | OUTPATIENT
Start: 2024-10-26 | End: 2024-10-26

## 2024-10-26 RX ORDER — CEFTRIAXONE SODIUM 1 G
1000 VIAL (EA) INJECTION ONCE
Refills: 0 | Status: COMPLETED | OUTPATIENT
Start: 2024-10-26 | End: 2024-10-26

## 2024-10-26 RX ADMIN — Medication 100 MILLIGRAM(S): at 14:51

## 2024-10-26 RX ADMIN — Medication 1000 MILLILITER(S): at 14:51

## 2024-10-26 NOTE — ED PROVIDER NOTE - ATTENDING APP SHARED VISIT CONTRIBUTION OF CARE
I personally made the management plan, and take responsibility for the patient's management. I have discussed with and reviewed the PA's note and agree with the History, ROS, Physical Exam and MDM unless otherwise indicated. A brief summary of my personal evaluation and impression can be found below.      68-year-old female with a history of type 2 diabetes, hypertension, hyperlipidemia presenting due to concern of dysuria, flank pain that is been worsening over the past 2 days.  Patient initially went to [facility] and was found to have a UTI started on levaquin, now developing leg cramping/jo ann horses associated w/ it. Pt denies fever, but has been experiencing chills. No N/V/D. Pt denies having past ua/cultures at the OSH. given worsening sx came here to be evaluated.     General: well-appearing, no acute distress  Head: Atraumatic, normocephalic  Eyes: EOM grossly in tact, no scleral icterus, no discharge  ENT: moist mucous membranes  Neurology: A&Ox 3, nonfocal, BATISTA x 4  Respiratory:  normal respiratory effort  CV:  Extremities warm and well perfused  Abdominal: Soft, non-distended, mild suprapubic ttp  Extremities: No edema, no deformities  Skin: warm and dry. No rashes    Differential diagnosis includes but is not limited to Pyelonephritis, kidney abscess, kidney stone though low concern for this given symptoms seem more consistent with an ascending infection.  Will give a dose of ceftriaxone here, unclear why they chose Levaquin given patient does not have any prior cultures there with sensitivities, given that she is having the leg cramping which may be associated with DC lengthy discussion advising patient to stop that antibiotic that we would give her a different one.  Also check labs for worsening systemic infection CT abdomen pelvis to eval for complications associated with pyelonephritis if negative would likely DC on antibiotics.

## 2024-10-26 NOTE — ED ADULT NURSE NOTE - CHIEF COMPLAINT QUOTE
c/o burning on urination for 1 week and left flank pain, was seen at [facility] and diagnosed with UTI, prescribed Levaquin but was instructed to stop taking the medications if leg cramping developed, pt c/o leg cramping  Phx of DM type 2, HTN, HLD    post triage pt is c/o palpitations.

## 2024-10-26 NOTE — ED PROVIDER NOTE - PROGRESS NOTE DETAILS
ALESIA Mayo- Pt feeling better after ER stay, CT negative for acute pathology but shows possible adynamic ileus. Pt has no active abd pain and is tolerating PO. Will dc with cefuroxime. stable for dc.

## 2024-10-26 NOTE — ED ADULT NURSE NOTE - OBJECTIVE STATEMENT
PT presented to ED for flank pain. PT is alert and oriented x4, pt ambulates well on her own and has no s/s of acute distress.

## 2024-10-26 NOTE — ED PROVIDER NOTE - PATIENT PORTAL LINK FT
You can access the FollowMyHealth Patient Portal offered by [facility] by registering at the following website: [email or web address]. By joining [facility]’s FollowMyHealth portal, you will also be able to view your health information using other applications (apps) compatible with our system.

## 2024-10-26 NOTE — ED ADULT TRIAGE NOTE - CHIEF COMPLAINT QUOTE
c/o burning on urination for 1 week and left flank pain, was seen at [facility] and diagnosed with UTI, prescribed Levaquin but was instructed to stop taking the medications if leg cramping developed, pt c/o leg cramping  Phx of DM type 2, HTN, HLD c/o burning on urination for 1 week and left flank pain, was seen at [facility] and diagnosed with UTI, prescribed Levaquin but was instructed to stop taking the medications if leg cramping developed, pt c/o leg cramping  Phx of DM type 2, HTN, HLD    post triage pt is c/o palpitations.

## 2024-10-26 NOTE — ED PROVIDER NOTE - OBJECTIVE STATEMENT
70 y/o female pmh htn, dm c/o worsening urinary symptoms. Pt admits to dysuria and frequency x 5 days. Pt was seen at [facility] x4 days ago and dx with UTI and dc on Levaquin 250mg qd. Pt states that her symptoms are worsening and now she has L flank pain and chills. pt denies chest pain, sob, n/v/d, numbness, tingling, weakness, dizziness, syncope or fever.

## 2024-10-26 NOTE — ED PROVIDER NOTE - NSFOLLOWUPINSTRUCTIONS_ED_ALL_ED_FT
Kidney Infection    WHAT YOU NEED TO KNOW:    What is a kidney infection? A kidney infection, or pyelonephritis, is a bacterial infection. The infection usually starts in your bladder or urethra and moves into your kidney. One or both kidneys may be infected.  Kidney, Ureters, Bladder    What increases my risk for a kidney infection?    A history of urinary tract infections    An indwelling urinary catheter    Blocked urine flow or urine that flows backward from your urethra    Pregnancy    Medical conditions such as diabetes or kidney stones  What are the signs and symptoms of a kidney infection?    Pain in your abdomen, lower back, or sides    Pain or burning when you urinate    A sudden strong urge to urinate or urinating more often than usual    Cloudy or bloody urine    Fever, chills, and fatigue    Nausea and vomiting  How is a kidney infection diagnosed? Your healthcare provider will ask about your symptoms and if you have other health conditions. Blood and urine tests will show infection. An ultrasound may be done to show an infection, abscess, or other problems in your kidneys.    How is a kidney infection treated?    Antibiotics treat your bacterial infection.    Acetaminophen decreases pain and fever. It is available without a doctor's order. Ask how much to take and how often to take it. Follow directions. Read the labels of all other medicines you are using to see if they also contain acetaminophen, or ask your doctor or pharmacist. Acetaminophen can cause liver damage if not taken correctly.    NSAIDs, such as ibuprofen, help decrease swelling, pain, and fever. This medicine is available with or without a doctor's order. NSAIDs can cause stomach bleeding or kidney problems in certain people. If you take blood thinner medicine, always ask if NSAIDs are safe for you. Always read the medicine label and follow directions. Do not give these medicines to children younger than 6 months without direction from a healthcare provider.    Prescription pain medicine may be given. Ask how to take this medicine safely.    Surgery may be needed if a ureter is blocked. The ureter is the tube that takes urine from a kidney to the bladder. A blocked ureter can cause repeated kidney infections.  How can I manage my symptoms?    Drink liquids as directed. You may need to drink extra liquids to help flush your kidneys and urinary system. Water is the best liquid to drink. Ask your healthcare provider how much liquid to drink each day and which liquids are best for you.    Urinate as soon as you feel the urge. This will help flush bacteria from your urinary system. Do not wait or hold your urine for too long.    Clean your genital area every day with soap and water. Wipe from front to back after you urinate or have a bowel movement. Wear cotton underwear. Fabrics such as nylon and polyester can stay damp. This can increase your risk for infection. Urinate within 15 minutes after you have sex.  When should I seek immediate care?    You have a fever and chills.    You cannot stop vomiting.    You have severe pain in your abdomen, lower back, or sides.  When should I contact my healthcare provider?    You continue to have a fever after you take antibiotics for 3 days.    You have pain when you urinate, even after treatment.    Your signs and symptoms return.    You have questions or concerns about your condition or care.  CARE AGREEMENT:    You have the right to help plan your care. Learn about your health condition and how it may be treated. Discuss treatment options with your healthcare providers to decide what care you want to receive. You always have the right to refuse treatment.

## 2024-10-27 LAB — BACTERIA UR CULT: NORMAL

## 2024-10-28 DIAGNOSIS — F33.1 MAJOR DEPRESSIVE DISORDER, RECURRENT, MODERATE: ICD-10-CM

## 2024-11-08 ENCOUNTER — APPOINTMENT (OUTPATIENT)
Dept: UROLOGY | Facility: CLINIC | Age: 69
End: 2024-11-08
Payer: MEDICARE

## 2024-11-08 VITALS
OXYGEN SATURATION: 97 % | WEIGHT: 178 LBS | TEMPERATURE: 97.8 F | DIASTOLIC BLOOD PRESSURE: 76 MMHG | BODY MASS INDEX: 26.98 KG/M2 | HEART RATE: 90 BPM | SYSTOLIC BLOOD PRESSURE: 158 MMHG | HEIGHT: 68 IN

## 2024-11-08 PROCEDURE — 51798 US URINE CAPACITY MEASURE: CPT

## 2024-11-08 PROCEDURE — 76775 US EXAM ABDO BACK WALL LIM: CPT

## 2024-11-08 PROCEDURE — 99204 OFFICE O/P NEW MOD 45 MIN: CPT | Mod: 25

## 2024-11-08 RX ORDER — ESTRADIOL 0.1 MG/G
0.1 CREAM VAGINAL
Qty: 1 | Refills: 2 | Status: ACTIVE | COMMUNITY
Start: 2024-11-08 | End: 1900-01-01

## 2024-11-11 ENCOUNTER — NON-APPOINTMENT (OUTPATIENT)
Age: 69
End: 2024-11-11

## 2024-11-11 LAB
APPEARANCE: CLEAR
BACTERIA UR CULT: NORMAL
BACTERIA: NEGATIVE /HPF
BILIRUBIN URINE: NEGATIVE
BLOOD URINE: NEGATIVE
CAST: 0 /LPF
COLOR: YELLOW
EPITHELIAL CELLS: 0 /HPF
GLUCOSE QUALITATIVE U: NEGATIVE MG/DL
KETONES URINE: NEGATIVE MG/DL
LEUKOCYTE ESTERASE URINE: NEGATIVE
MICROSCOPIC-UA: NORMAL
NITRITE URINE: NEGATIVE
PH URINE: 6.5
PROTEIN URINE: NEGATIVE MG/DL
RED BLOOD CELLS URINE: 0 /HPF
SPECIFIC GRAVITY URINE: 1.01
UROBILINOGEN URINE: 0.2 MG/DL
WHITE BLOOD CELLS URINE: 0 /HPF

## 2024-11-12 ENCOUNTER — APPOINTMENT (OUTPATIENT)
Dept: INFECTIOUS DISEASE | Facility: CLINIC | Age: 69
End: 2024-11-12
Payer: MEDICARE

## 2024-11-12 VITALS
BODY MASS INDEX: 26.67 KG/M2 | SYSTOLIC BLOOD PRESSURE: 155 MMHG | DIASTOLIC BLOOD PRESSURE: 85 MMHG | TEMPERATURE: 96.8 F | HEART RATE: 88 BPM | HEIGHT: 68 IN | WEIGHT: 176 LBS | OXYGEN SATURATION: 97 %

## 2024-11-12 DIAGNOSIS — Z80.0 FAMILY HISTORY OF MALIGNANT NEOPLASM OF DIGESTIVE ORGANS: ICD-10-CM

## 2024-11-12 DIAGNOSIS — Z63.5 DISRUPTION OF FAMILY BY SEPARATION AND DIVORCE: ICD-10-CM

## 2024-11-12 DIAGNOSIS — Z78.9 OTHER SPECIFIED HEALTH STATUS: ICD-10-CM

## 2024-11-12 DIAGNOSIS — E11.9 TYPE 2 DIABETES MELLITUS W/OUT COMPLICATIONS: ICD-10-CM

## 2024-11-12 DIAGNOSIS — Z86.79 PERSONAL HISTORY OF OTHER DISEASES OF THE CIRCULATORY SYSTEM: ICD-10-CM

## 2024-11-12 DIAGNOSIS — Z87.448 PERSONAL HISTORY OF OTHER DISEASES OF URINARY SYSTEM: ICD-10-CM

## 2024-11-12 DIAGNOSIS — N39.0 URINARY TRACT INFECTION, SITE NOT SPECIFIED: ICD-10-CM

## 2024-11-12 DIAGNOSIS — Z80.3 FAMILY HISTORY OF MALIGNANT NEOPLASM OF BREAST: ICD-10-CM

## 2024-11-12 PROCEDURE — 99204 OFFICE O/P NEW MOD 45 MIN: CPT

## 2024-11-12 RX ORDER — NITROFURANTOIN (MONOHYDRATE/MACROCRYSTALS) 25; 75 MG/1; MG/1
100 CAPSULE ORAL
Qty: 30 | Refills: 0 | Status: ACTIVE | COMMUNITY
Start: 2024-11-12 | End: 1900-01-01

## 2024-11-12 SDOH — SOCIAL STABILITY - SOCIAL INSECURITY: DISRUPTION OF FAMILY BY SEPARATION AND DIVORCE: Z63.5

## 2024-11-13 LAB
ALBUMIN SERPL ELPH-MCNC: 4.8 G/DL
ALP BLD-CCNC: 112 U/L
ALT SERPL-CCNC: 37 U/L
ANION GAP SERPL CALC-SCNC: 16 MMOL/L
AST SERPL-CCNC: 40 U/L
BASOPHILS # BLD AUTO: 0.06 K/UL
BASOPHILS NFR BLD AUTO: 0.8 %
BILIRUB SERPL-MCNC: 0.4 MG/DL
BUN SERPL-MCNC: 30 MG/DL
CALCIUM SERPL-MCNC: 10.8 MG/DL
CHLORIDE SERPL-SCNC: 93 MMOL/L
CO2 SERPL-SCNC: 26 MMOL/L
CREAT SERPL-MCNC: 1.3 MG/DL
EGFR: 44 ML/MIN/1.73M2
EOSINOPHIL # BLD AUTO: 0.04 K/UL
EOSINOPHIL NFR BLD AUTO: 0.5 %
GLUCOSE SERPL-MCNC: 98 MG/DL
HCT VFR BLD CALC: 38.5 %
HGB BLD-MCNC: 12.6 G/DL
IMM GRANULOCYTES NFR BLD AUTO: 0.3 %
LYMPHOCYTES # BLD AUTO: 1.84 K/UL
LYMPHOCYTES NFR BLD AUTO: 23.3 %
MAN DIFF?: NORMAL
MCHC RBC-ENTMCNC: 28.1 PG
MCHC RBC-ENTMCNC: 32.7 G/DL
MCV RBC AUTO: 85.7 FL
MONOCYTES # BLD AUTO: 0.67 K/UL
MONOCYTES NFR BLD AUTO: 8.5 %
NEUTROPHILS # BLD AUTO: 5.27 K/UL
NEUTROPHILS NFR BLD AUTO: 66.6 %
PLATELET # BLD AUTO: 268 K/UL
POTASSIUM SERPL-SCNC: 4.9 MMOL/L
PROT SERPL-MCNC: 8.6 G/DL
RBC # BLD: 4.49 M/UL
RBC # FLD: 15 %
SODIUM SERPL-SCNC: 134 MMOL/L
WBC # FLD AUTO: 7.9 K/UL

## 2024-11-15 ENCOUNTER — APPOINTMENT (OUTPATIENT)
Facility: CLINIC | Age: 69
End: 2024-11-15

## 2024-11-22 ENCOUNTER — APPOINTMENT (OUTPATIENT)
Dept: INTERNAL MEDICINE | Facility: CLINIC | Age: 69
End: 2024-11-22

## 2024-11-25 ENCOUNTER — APPOINTMENT (OUTPATIENT)
Dept: INTERNAL MEDICINE | Facility: CLINIC | Age: 69
End: 2024-11-25
Payer: MEDICARE

## 2024-11-25 DIAGNOSIS — K59.00 CONSTIPATION, UNSPECIFIED: ICD-10-CM

## 2024-11-25 PROCEDURE — 99441: CPT | Mod: 93

## 2024-12-09 ENCOUNTER — APPOINTMENT (OUTPATIENT)
Dept: UROLOGY | Facility: CLINIC | Age: 69
End: 2024-12-09
Payer: MEDICARE

## 2024-12-09 VITALS
HEART RATE: 99 BPM | OXYGEN SATURATION: 98 % | BODY MASS INDEX: 26.67 KG/M2 | SYSTOLIC BLOOD PRESSURE: 161 MMHG | WEIGHT: 176 LBS | DIASTOLIC BLOOD PRESSURE: 91 MMHG | HEIGHT: 68 IN | TEMPERATURE: 97.8 F

## 2024-12-09 PROCEDURE — 52000 CYSTOURETHROSCOPY: CPT

## 2024-12-09 PROCEDURE — 51798 US URINE CAPACITY MEASURE: CPT

## 2024-12-09 PROCEDURE — 99213 OFFICE O/P EST LOW 20 MIN: CPT | Mod: 25

## 2025-02-19 NOTE — ED PROVIDER NOTE - DISCHARGE REVIEW MATERIAL PRESENTED
Initiate Treatment: Mupirocin 2% topical ointment Detail Level: Zone Plan: Will put ointment on individual yellow crusted lesions on scalp Plan: Will use steroid when flaring, then only will use as needed. Initiate Treatment: Derma-Smoothe/FS Scalp Oil 0.01%\\n\\nZoryve 0.3% foam .

## 2025-02-28 NOTE — ASU PREOP CHECKLIST - NSBLOODTRANS_GEN_A_CORE_SIUH
CMP-normal kidney and liver function, blood sugar 120  Glycohemoglobin 6.8%-good diabetes control  Urine microalbumin normal  Cholesterol panel-normal, improved, LDL at goal
no...

## 2025-03-04 ENCOUNTER — APPOINTMENT (OUTPATIENT)
Dept: INTERNAL MEDICINE | Facility: CLINIC | Age: 70
End: 2025-03-04
Payer: MEDICARE

## 2025-03-04 VITALS
HEIGHT: 67 IN | WEIGHT: 175 LBS | TEMPERATURE: 97.8 F | SYSTOLIC BLOOD PRESSURE: 124 MMHG | BODY MASS INDEX: 27.47 KG/M2 | HEART RATE: 44 BPM | DIASTOLIC BLOOD PRESSURE: 80 MMHG | OXYGEN SATURATION: 79 % | RESPIRATION RATE: 100 BRPM

## 2025-03-04 DIAGNOSIS — Z02.89 ENCOUNTER FOR OTHER ADMINISTRATIVE EXAMINATIONS: ICD-10-CM

## 2025-03-04 DIAGNOSIS — M19.90 UNSPECIFIED OSTEOARTHRITIS, UNSPECIFIED SITE: ICD-10-CM

## 2025-03-04 PROCEDURE — G2211 COMPLEX E/M VISIT ADD ON: CPT

## 2025-03-04 PROCEDURE — 99212 OFFICE O/P EST SF 10 MIN: CPT

## 2025-03-11 ENCOUNTER — APPOINTMENT (OUTPATIENT)
Dept: ORTHOPEDIC SURGERY | Facility: CLINIC | Age: 70
End: 2025-03-11
Payer: MEDICARE

## 2025-03-11 DIAGNOSIS — M17.0 BILATERAL PRIMARY OSTEOARTHRITIS OF KNEE: ICD-10-CM

## 2025-03-11 PROCEDURE — 20610 DRAIN/INJ JOINT/BURSA W/O US: CPT | Mod: 50

## 2025-03-11 PROCEDURE — 73564 X-RAY EXAM KNEE 4 OR MORE: CPT | Mod: LT,RT

## 2025-03-11 PROCEDURE — 99214 OFFICE O/P EST MOD 30 MIN: CPT | Mod: 25

## 2025-03-12 ENCOUNTER — APPOINTMENT (OUTPATIENT)
Dept: UROLOGY | Facility: CLINIC | Age: 70
End: 2025-03-12

## 2025-05-19 ENCOUNTER — LABORATORY RESULT (OUTPATIENT)
Age: 70
End: 2025-05-19

## 2025-05-23 ENCOUNTER — NON-APPOINTMENT (OUTPATIENT)
Age: 70
End: 2025-05-23

## 2025-05-23 ENCOUNTER — APPOINTMENT (OUTPATIENT)
Dept: INTERNAL MEDICINE | Facility: CLINIC | Age: 70
End: 2025-05-23
Payer: MEDICARE

## 2025-05-23 VITALS
HEIGHT: 67 IN | BODY MASS INDEX: 29.82 KG/M2 | TEMPERATURE: 97.2 F | HEART RATE: 75 BPM | DIASTOLIC BLOOD PRESSURE: 70 MMHG | WEIGHT: 190 LBS | OXYGEN SATURATION: 99 % | SYSTOLIC BLOOD PRESSURE: 140 MMHG

## 2025-05-23 DIAGNOSIS — K59.00 CONSTIPATION, UNSPECIFIED: ICD-10-CM

## 2025-05-23 DIAGNOSIS — E11.9 TYPE 2 DIABETES MELLITUS W/OUT COMPLICATIONS: ICD-10-CM

## 2025-05-23 DIAGNOSIS — Z00.00 ENCOUNTER FOR GENERAL ADULT MEDICAL EXAMINATION W/OUT ABNORMAL FINDINGS: ICD-10-CM

## 2025-05-23 DIAGNOSIS — N39.0 URINARY TRACT INFECTION, SITE NOT SPECIFIED: ICD-10-CM

## 2025-05-23 DIAGNOSIS — N18.9 CHRONIC KIDNEY DISEASE, UNSPECIFIED: ICD-10-CM

## 2025-05-23 PROCEDURE — G0439: CPT

## 2025-05-23 PROCEDURE — 93000 ELECTROCARDIOGRAM COMPLETE: CPT

## 2025-05-24 LAB
APPEARANCE: CLEAR
BACTERIA: ABNORMAL /HPF
BILIRUBIN URINE: NEGATIVE
BLOOD URINE: NEGATIVE
CAST: 0 /LPF
COLOR: YELLOW
EPITHELIAL CELLS: 9 /HPF
GLUCOSE QUALITATIVE U: NEGATIVE MG/DL
KETONES URINE: NEGATIVE MG/DL
LEUKOCYTE ESTERASE URINE: ABNORMAL
MICROSCOPIC-UA: NORMAL
NITRITE URINE: NEGATIVE
PH URINE: 6
PROTEIN URINE: NEGATIVE MG/DL
RED BLOOD CELLS URINE: 0 /HPF
SPECIFIC GRAVITY URINE: 1.02
UROBILINOGEN URINE: 0.2 MG/DL
WHITE BLOOD CELLS URINE: 12 /HPF

## 2025-05-27 LAB — BACTERIA UR CULT: ABNORMAL

## 2025-05-30 ENCOUNTER — RX RENEWAL (OUTPATIENT)
Age: 70
End: 2025-05-30

## 2025-07-28 NOTE — ED ADULT NURSE NOTE - NSFALLRISKFACTORS_ED_ALL_ED
PROGRESS NOTE    Subjective     Tina is a 63 year old here for Derm Problem (Redness and swelling on the RT shoulder since last Wednesday, it's warm to the touch, sometimes - not itching )     Established Patient of clinic, a new patient to me.       Patient chart reviewed.  Patient last seen by Family Medicine on 7/23/25, for annual.      The patient presents for evaluation of redness on her arm.    Redness on Arm  - She received a pneumonia vaccine on Wednesday, after which she noticed a sensation similar to a bite at the injection site.  - The area occasionally feels warm to touch, but she reports no pain, fevers, or chills.  - Her arm mobility remains unaffected.  - She has not applied any topical treatments  - The redness has slightly spread since its onset, but the color has not changed.  - The redness was initially smaller and has been gradually increasing in size.  - She has never experienced an injection site reaction before.  - She has been managing the discomfort with ibuprofen.  - Applying ice seems to alleviate the symptoms.        Review of Systems      SDOH Never Smoker     Depression Screen  More Data Synopsis  PHQ2/9 Numeric Score  More data exists       7/28/2025 7/23/2025   PHQ 2/9 Numeric Score   Adult PHQ 2 Score 0 0   Adult PHQ 2 Interpretation No further screening needed No further screening needed   DEPRESSION ASSESSMENT/PLAN:  Depression screening is negative no further plan needed.     Objective   Vitals:    07/28/25 1435   BP: 125/69   Pulse: 92   Temp: 98.1 °F (36.7 °C)   TempSrc: Oral   SpO2: 98%   Weight: 68.6 kg (151 lb 5.5 oz)   Height: 5' 6\" (1.676 m)   BMI (Calculated): 24.43     Physical Exam  Vitals reviewed.   Pulmonary:      Effort: Pulmonary effort is normal.   Skin:     Comments: Faint redness present at RUE.  Warmth.  No induration.  No TTP.    Psychiatric:         Behavior: Behavior normal.                  ASSESSMENT AND PLAN        Injection site reaction  - Monitoring:  Redness on the arm appears to be an injection site reaction rather than cellulitis or a bug bite.  - Treatment: Advised to apply cold packs to the affected area for no more than 10 minutes at a time, ensuring a barrier between the ice and the skin to prevent frostbite. Over-the-counter pain relievers such as Tylenol or ibuprofen can be used if necessary. If the redness spreads further, becomes hard, or if symptoms such as fever, chills, or general malaise occur, antibiotics can be prescribed, however unlikely cellulitis at this time.  1. Injection site reaction, initial encounter      Return if symptoms worsen or fail to improve.          Amairani Vegas, DO    No indicators present

## 2025-07-30 ENCOUNTER — RX RENEWAL (OUTPATIENT)
Age: 70
End: 2025-07-30

## (undated) DEVICE — FOLEY HOLDER STATLOCK 2 WAY ADULT

## (undated) DEVICE — ELCTR GROUNDING PAD ADULT COVIDIEN

## (undated) DEVICE — CATH IV SAFE BC 20G X 1.16" (PINK)

## (undated) DEVICE — SOL INJ NS 0.9% 500ML 2 PORT

## (undated) DEVICE — CATH IV SAFE BC 22G X 1" (BLUE)

## (undated) DEVICE — SENSOR O2 FINGER ADULT

## (undated) DEVICE — Device

## (undated) DEVICE — SUCTION YANKAUER NO CONTROL VENT

## (undated) DEVICE — IRRIGATOR BIO SHIELD

## (undated) DEVICE — TUBING SUCTION CONN 6FT STERILE

## (undated) DEVICE — CLAMP BX HOT RAD JAW 3

## (undated) DEVICE — TUBING IV SET GRAVITY 3Y 100" MACRO

## (undated) DEVICE — TUBING SUCTION 20FT

## (undated) DEVICE — TUBING CAP SET ENDO 24HR USE GI

## (undated) DEVICE — PACK IV START WITH CHG

## (undated) DEVICE — POLY TRAP ETRAP